# Patient Record
Sex: FEMALE | Race: WHITE | NOT HISPANIC OR LATINO | Employment: FULL TIME | URBAN - METROPOLITAN AREA
[De-identification: names, ages, dates, MRNs, and addresses within clinical notes are randomized per-mention and may not be internally consistent; named-entity substitution may affect disease eponyms.]

---

## 2017-01-23 DIAGNOSIS — M06.9 RHEUMATOID ARTHRITIS, INVOLVING UNSPECIFIED SITE, UNSPECIFIED RHEUMATOID FACTOR PRESENCE: ICD-10-CM

## 2017-01-23 NOTE — TELEPHONE ENCOUNTER
Please have patient call and make an appointment.  I have ordered labs to be done before next appointment.  We will refill at next appointment AND when labs are completed.

## 2017-01-23 NOTE — TELEPHONE ENCOUNTER
Was the patient seen in the last year in this department? Yes  last apt. Was is march    Does patient have an active prescription for medications requested? No     Received Request Via: Pharmacy

## 2017-03-22 ENCOUNTER — OFFICE VISIT (OUTPATIENT)
Dept: RHEUMATOLOGY | Facility: PHYSICIAN GROUP | Age: 47
End: 2017-03-22
Payer: MEDICAID

## 2017-03-22 VITALS
DIASTOLIC BLOOD PRESSURE: 92 MMHG | BODY MASS INDEX: 29.91 KG/M2 | TEMPERATURE: 97.9 F | RESPIRATION RATE: 12 BRPM | HEART RATE: 60 BPM | WEIGHT: 191 LBS | OXYGEN SATURATION: 97 % | SYSTOLIC BLOOD PRESSURE: 138 MMHG

## 2017-03-22 DIAGNOSIS — L40.9 PSORIASIS: ICD-10-CM

## 2017-03-22 DIAGNOSIS — L40.50 PSORIATIC ARTHRITIS (HCC): ICD-10-CM

## 2017-03-22 DIAGNOSIS — Z72.0 TOBACCO USE: ICD-10-CM

## 2017-03-22 DIAGNOSIS — Z79.899 ENCOUNTER FOR LONG-TERM (CURRENT) USE OF HIGH-RISK MEDICATION: ICD-10-CM

## 2017-03-22 PROCEDURE — 99214 OFFICE O/P EST MOD 30 MIN: CPT | Performed by: INTERNAL MEDICINE

## 2017-03-22 ASSESSMENT — ENCOUNTER SYMPTOMS
COUGH: 0
MYALGIAS: 0
CHILLS: 0
FEVER: 0

## 2017-03-22 NOTE — PROGRESS NOTES
Subjective:   Date of Consultation:3/22/2017  1:55 PM  Primary care physician:Keira Lucas M.D.    Reason for Consultation:  Ms. Woodard  is a pleasant 46 y.o. year old female who presented with Psoriasis/Psoriatic Arthritis      Psoriatic Arthritis  She states that her hands and wrists.  She also has knee and hip pain.  She feels worse in the evening after work.  She feels most stiff in the morning for 30-45 minutes.  Throughout the day her joints bother her.  She notes her feet, hips hurt.  She has a lot of pain when she uses them a lot at work.  She notes increase swelling when she first wakes up and her feet with prolonged standing 30 minutes will swell.    Psoriasis  Is markedly improved with methotrexate  Improved from last visit    Current Medications  Methotrexate 5 tabs weekly po q Saturday  Is not taking folic acid  Enbrel q Saturday      Social history: 1 ppd desire to quit and has asked for chantix and waiting for insurance.    Past Medical History   Diagnosis Date   • Psoriasis    • Smoking    • Hypertension      Borderline   • CAD (coronary artery disease) December 2012     NSTEMI. Coronary angiogram with 60% LAD stenosis, 40% stenosis proximal LAD, 20% stenosis mid circumflex.   • Hyperlipidemia    • Depression    • Shortness of breath February 2014     Echocardiogram with normal LV size, LVEF 60-65%, no valvular abnormalities.     No past surgical history on file.  No Known Allergies  Outpatient Encounter Prescriptions as of 3/22/2017   Medication Sig Dispense Refill   • TRAZODONE HCL PO Take  by mouth.     • methotrexate 2.5 MG Tab Take 5 Tabs by mouth every 7 days. No further refills without labs and appointment 25 Tab 0   • carisoprodol (SOMA) 350 MG Tab      • Carisoprodol 250 MG Tab      • hydrocodone/acetaminophen (NORCO)  MG Tab      • metoprolol SR (TOPROL XL) 100 MG TABLET SR 24 HR      • spironolactone (ALDACTONE) 25 MG Tab      • topiramate (TOPAMAX) 50 MG tablet      • Etanercept  (ENBREL SURECLICK) 50 MG/ML Solution Auto-injector Inject 50 mg as instructed every 7 days. 4 Syringe 12   • ENBREL SURECLICK 50 MG/ML SOAJ INJECT THE CONTENTS OF 1 SURECLICK (50 MG) SUBCUTANEOUSLY ONCE WEEKLY. 11.76 mL 1   • metoprolol SR (TOPROL XL) 50 MG TB24 Take 2 Tabs by mouth every day. 60 Tab 3   • alprazolam (XANAX) 0.5 MG TABS Take 0.5 mg by mouth at bedtime as needed.     • sertraline (ZOLOFT) 100 MG TABS Take 100 mg by mouth every day.     • sertraline (ZOLOFT) 50 MG TABS Take 50 mg by mouth every day.     • amlodipine (NORVASC) 10 MG TABS Take 10 mg by mouth every day.     • potassium chloride SA (K-DUR) 20 MEQ TBCR Take 20 mEq by mouth every day.     • aspirin (ASA) 81 MG CHEW Take 1 Tab by mouth every day. 100 Each 3   • atorvastatin (LIPITOR) 40 MG TABS Take 1 Tab by mouth every bedtime. 30 Tab 11   • clopidogrel (PLAVIX) 75 MG TABS Take 1 Tab by mouth every day. 30 Tab 11   • folic acid (FOLVITE) 1 MG Tab Take 1 Tab by mouth every day. 90 Tab 3   • tramadol (ULTRAM) 50 MG Tab      • zolpidem (AMBIEN) 10 MG Tab      • predniSONE (DELTASONE) 5 MG Tab Take 15 mg daily for 1 week then 10 mg daily for 1 week and then 5 mg daily. 60 Tab 1   • predniSONE (DELTASONE) 5 MG Tab Take 15 mg daily for 1 week then 10 mg daily for 1 week and then 5 mg daily. 60 Tab 1   • nitroglycerin (NITRODUR) 0.4 MG/HR PT24 Apply 1 Patch to skin as directed every day. wear patch for 12 hours, then remove for 12 hours.     • hydrocodone-acetaminophen (NORCO) 5-325 MG TABS per tablet Take 1-2 Tabs by mouth as needed.     • furosemide (LASIX) 40 MG TABS Take 40 mg by mouth every day.       No facility-administered encounter medications on file as of 3/22/2017.       Social History     Social History   • Marital Status: Single     Spouse Name: N/A   • Number of Children: N/A   • Years of Education: N/A     Occupational History   • Not on file.     Social History Main Topics   • Smoking status: Current Every Day Smoker   • Smokeless  tobacco: Never Used   • Alcohol Use: Yes   • Drug Use: Not on file   • Sexual Activity: Not on file     Other Topics Concern   • Not on file     Social History Narrative      History   Smoking status   • Current Every Day Smoker   Smokeless tobacco   • Never Used     History   Alcohol Use   • Yes     History   Drug Use Not on file      OB History   No data available      Patient's last menstrual period was 03/13/2017.    G P A JESSE     Family History   Problem Relation Age of Onset   • Heart Disease Sister        Review of Systems   Constitutional: Negative for fever and chills.   Respiratory: Negative for cough.    Cardiovascular: Negative for chest pain.   Musculoskeletal: Positive for joint pain. Negative for myalgias.   Skin: Positive for rash.        Objective:   /92 mmHg  Pulse 60  Temp(Src) 36.6 °C (97.9 °F)  Resp 12  Wt 86.637 kg (191 lb)  SpO2 97%  LMP 03/13/2017  Breastfeeding? No    Physical Exam   Constitutional: She is oriented to person, place, and time. She appears well-developed and well-nourished. No distress.   HENT:   Head: Normocephalic and atraumatic.   Right Ear: External ear normal.   Left Ear: External ear normal.   Eyes: Conjunctivae are normal. Right eye exhibits no discharge. Left eye exhibits no discharge. No scleral icterus.   Cardiovascular: Normal rate, regular rhythm and normal heart sounds.    Pulmonary/Chest: Effort normal and breath sounds normal. No stridor. No respiratory distress. She has no wheezes. She has no rales.   Abdominal: Soft.   Musculoskeletal: She exhibits no edema.   Tenderness over the Achilles tendon left greater than right. No overt synovitis appreciated. The DIPs and MCPs and there is mild tenderness on palpation at the PIPs.   Neurological: She is alert and oriented to person, place, and time. No cranial nerve deficit.   Skin: Skin is warm. Rash noted. She is not diaphoretic. No pallor.   Limited skin exam.  On the right anterior shin there is lesion  on the surface with mild scaling   Psychiatric: She has a normal mood and affect. Her behavior is normal. Judgment and thought content normal.       Assessment:     1. Tobacco use  HEP C VIRUS ANTIBODY    BUN    CBC WITH DIFFERENTIAL    CREATININE    HEPATIC FUNCTION PANEL    WESTERGREN SED RATE    CRP QUANTITIVE (NON-CARDIAC)    VITAMIN D,25 HYDROXY   2. Psoriasis  HEP C VIRUS ANTIBODY    BUN    CBC WITH DIFFERENTIAL    CREATININE    HEPATIC FUNCTION PANEL    WESTERGREN SED RATE    CRP QUANTITIVE (NON-CARDIAC)    VITAMIN D,25 HYDROXY   3. Psoriatic arthritis (CMS-HCC)  HEP C VIRUS ANTIBODY    BUN    CBC WITH DIFFERENTIAL    CREATININE    HEPATIC FUNCTION PANEL    WESTERGREN SED RATE    CRP QUANTITIVE (NON-CARDIAC)    VITAMIN D,25 HYDROXY   4. Encounter for long-term (current) use of high-risk medication       Labs:    No results found for: QNTTBGOLD  No results found for: HEPBCORTOT, HEPBCORIGM, HEPBSAG  No results found for: HEPCAB  No results found for: SODIUM, POTASSIUM, CHLORIDE, CO2, GLUCOSE, BUN, CREATININE, BUNCREATRAT, GLOMRATE   No results found for: WBC, RBC, HEMOGLOBIN, HEMATOCRIT, MCV, MCH, MCHC, MPV, NEUTSPOLYS, LYMPHOCYTES, MONOCYTES, EOSINOPHILS, BASOPHILS, HYPOCHROMIA, ANISOCYTOSIS   No results found for: CALCIUM, ASTSGOT, ALTSGPT, ALKPHOSPHAT, TBILIRUBIN, ALBUMIN, TOTPROTEIN  No results found for: URICACID, RHEUMFACTN, CCPANTIBODY, ANTINUCAB  No results found for: SEDRATEWES, CREACTPROT  No results found for: RUSSELVIPER, DRVVTINTERP  No results found for: D1SRWLPGNMW, Y3DVOCVCWIL, IGGCARDIOLI, IGMCARDIOLI, IGACARDIOLI, CRYOGLOBULIN  No results found for: ANADIRECT, ANTIDNADS, RNPAB, SMITHAB, HMXFNUB70, SSAROAB, SSBLAAB, DIEDMO6GX, CENTROMBAB  No results found for: ANTIDNADS, DSDNA, AGBMAB, GBMABA, ANCAIGG, W3MWYHEHNBD, JO1AB, RNPAB, ANTISSASJ, ANTISSBSJ  No results found for: COLORURINE, SPECGRAVITY, PHURINE, GLUCOSEUR, KETONES, PROTEINURIN  No results found for: TOTPROTUR, TOTALVOLUME,  [id number]  No results found for: SSA60, SSA52  No results found for: HBA1C  No results found for: CPKTOTAL  No results found for: G6PD  No results found for: JBEH63GQUF  No results found for: ACESERUM  No results found for: 25HYDROXY  No results found for: TSH, FREEDIR  No results found for: TSHULTRASEN, FREET4  No results found for: MICROSOMALA, ANTITHYROGL  No results found for: IGGLYMEABS  No results found for: ANTIMITOCHO, FACTIN  No results found for: IGA, TTRANSIGA, ENDOIGA  No results found for: FLTYPE, CRYSTALSBDF  No results found for: ISTATICAL  No results found for: ISTATCREAT  No results found for: CTELOPEP  No results found for: GBMABG  No results found for: PTHINTACT      Medical Decision Making:  Today's Assessment / Status / Plan:     Psoriasis  Improved but still with active lesions      Psoriatic Arthritis  Uncontrolled with enthesitis and prolonged morning stiffness  Patient has been off methotrexate  We will obtain labs and if normal will restart at a higher dose since it did not control her arthritis.  We will start at 8 tablets weekly po  She is continuing Enbrel    Encounter for high risk medications.      Tobacco use  Patient states that she is currently trying to quit. Waiting for insurance to approve Chantix    1. Tobacco use  HEP C VIRUS ANTIBODY    BUN    CBC WITH DIFFERENTIAL    CREATININE    HEPATIC FUNCTION PANEL    WESTERGREN SED RATE    CRP QUANTITIVE (NON-CARDIAC)    VITAMIN D,25 HYDROXY   2. Psoriasis  HEP C VIRUS ANTIBODY    BUN    CBC WITH DIFFERENTIAL    CREATININE    HEPATIC FUNCTION PANEL    WESTERGREN SED RATE    CRP QUANTITIVE (NON-CARDIAC)    VITAMIN D,25 HYDROXY   3. Psoriatic arthritis (CMS-HCC)  HEP C VIRUS ANTIBODY    BUN    CBC WITH DIFFERENTIAL    CREATININE    HEPATIC FUNCTION PANEL    WESTERGREN SED RATE    CRP QUANTITIVE (NON-CARDIAC)    VITAMIN D,25 HYDROXY   4. Encounter for long-term (current) use of high-risk medication       Return in about 3 months (around  6/22/2017).        I have spent greater than 50% of this 30 minute visit in counseling/coordination of care with the patient regarding her disease, symptoms and therapy plan..    She agreed and verbalized her agreement and understanding with the current plan. I answered all questions and concerns she has at this time and advised her to call at any time in there interim with questions or concerns in regards to her care.    Thank you for allowing me to participate in her care, I will continue to follow closely.     Please note that this dictation was created using voice recognition software. I have made every reasonable attempt to correct obvious errors, but I expect that there are errors of grammar and possibly content that I did not discover before finalizing the note.

## 2017-03-22 NOTE — Clinical Note
[facility]   [address], [address]  [place] [address]  Phone: [phone]  Fax: [phone]              Encounter Date: 3/22/2017    Dear Dr. Lucas,    It was a pleasure seeing your patient, Lorene Woodard, on 3/22/2017. Diagnoses of Tobacco use, Psoriasis, Psoriatic arthritis (CMS-[facility]), and Encounter for long-term (current) use of high-risk medication were pertinent to this visit.     Please find attached progress note which includes the history I obtained from Ms. Woodard, my physical examination findings, my impression and recommendations.      Once again, it was a pleasure participating in your patient's care.  Please feel free to contact me if you have any questions or if I can be of any further assistance to your patients.      Sincerely,    Tiffany Anders M.D.  Electronically Signed          PROGRESS NOTE:  Subjective:   Date of Consultation:3/22/2017  1:55 PM  Primary care physician:Keira Lucas M.D.    Reason for Consultation:  Ms. Woodard  is a pleasant 46 y.o. year old female who presented with Psoriasis/Psoriatic Arthritis      Psoriatic Arthritis  She states that her hands and wrists.  She also has knee and hip pain.  She feels worse in the evening after work.  She feels most stiff in the morning for 30-45 minutes.  Throughout the day her joints bother her.  She notes her feet, hips hurt.  She has a lot of pain when she uses them a lot at work.  She notes increase swelling when she first wakes up and her feet with prolonged standing 30 minutes will swell.    Psoriasis  Is markedly improved with methotrexate  Improved from last visit    Current Medications  Methotrexate 5 tabs weekly po q Saturday  Is not taking folic acid  Enbrel q Saturday      Social history: 1 ppd desire to quit and has asked for chantix and waiting for insurance.    Past Medical History   Diagnosis Date   • Psoriasis    • Smoking    • Hypertension      Borderline   • CAD (coronary artery disease) December  2012     NSTEMI. Coronary angiogram with 60% LAD stenosis, 40% stenosis proximal LAD, 20% stenosis mid circumflex.   • Hyperlipidemia    • Depression    • Shortness of breath February 2014     Echocardiogram with normal LV size, LVEF 60-65%, no valvular abnormalities.     No past surgical history on file.  No Known Allergies  Outpatient Encounter Prescriptions as of 3/22/2017   Medication Sig Dispense Refill   • TRAZODONE HCL PO Take  by mouth.     • methotrexate 2.5 MG Tab Take 5 Tabs by mouth every 7 days. No further refills without labs and appointment 25 Tab 0   • carisoprodol (SOMA) 350 MG Tab      • Carisoprodol 250 MG Tab      • hydrocodone/acetaminophen (NORCO)  MG Tab      • metoprolol SR (TOPROL XL) 100 MG TABLET SR 24 HR      • spironolactone (ALDACTONE) 25 MG Tab      • topiramate (TOPAMAX) 50 MG tablet      • Etanercept (ENBREL SURECLICK) 50 MG/ML Solution Auto-injector Inject 50 mg as instructed every 7 days. 4 Syringe 12   • ENBREL SURECLICK 50 MG/ML SOAJ INJECT THE CONTENTS OF 1 SURECLICK (50 MG) SUBCUTANEOUSLY ONCE WEEKLY. 11.76 mL 1   • metoprolol SR (TOPROL XL) 50 MG TB24 Take 2 Tabs by mouth every day. 60 Tab 3   • alprazolam (XANAX) 0.5 MG TABS Take 0.5 mg by mouth at bedtime as needed.     • sertraline (ZOLOFT) 100 MG TABS Take 100 mg by mouth every day.     • sertraline (ZOLOFT) 50 MG TABS Take 50 mg by mouth every day.     • amlodipine (NORVASC) 10 MG TABS Take 10 mg by mouth every day.     • potassium chloride SA (K-DUR) 20 MEQ TBCR Take 20 mEq by mouth every day.     • aspirin (ASA) 81 MG CHEW Take 1 Tab by mouth every day. 100 Each 3   • atorvastatin (LIPITOR) 40 MG TABS Take 1 Tab by mouth every bedtime. 30 Tab 11   • clopidogrel (PLAVIX) 75 MG TABS Take 1 Tab by mouth every day. 30 Tab 11   • folic acid (FOLVITE) 1 MG Tab Take 1 Tab by mouth every day. 90 Tab 3   • tramadol (ULTRAM) 50 MG Tab      • zolpidem (AMBIEN) 10 MG Tab      • predniSONE (DELTASONE) 5 MG Tab Take 15 mg daily  for 1 week then 10 mg daily for 1 week and then 5 mg daily. 60 Tab 1   • predniSONE (DELTASONE) 5 MG Tab Take 15 mg daily for 1 week then 10 mg daily for 1 week and then 5 mg daily. 60 Tab 1   • nitroglycerin (NITRODUR) 0.4 MG/HR PT24 Apply 1 Patch to skin as directed every day. wear patch for 12 hours, then remove for 12 hours.     • hydrocodone-acetaminophen (NORCO) 5-325 MG TABS per tablet Take 1-2 Tabs by mouth as needed.     • furosemide (LASIX) 40 MG TABS Take 40 mg by mouth every day.       No facility-administered encounter medications on file as of 3/22/2017.       Social History     Social History   • Marital Status: Single     Spouse Name: N/A   • Number of Children: N/A   • Years of Education: N/A     Occupational History   • Not on file.     Social History Main Topics   • Smoking status: Current Every Day Smoker   • Smokeless tobacco: Never Used   • Alcohol Use: Yes   • Drug Use: Not on file   • Sexual Activity: Not on file     Other Topics Concern   • Not on file     Social History Narrative      History   Smoking status   • Current Every Day Smoker   Smokeless tobacco   • Never Used     History   Alcohol Use   • Yes     History   Drug Use Not on file      OB History   No data available      Patient's last menstrual period was 03/13/2017.    G P A L     Family History   Problem Relation Age of Onset   • Heart Disease Sister        Review of Systems   Constitutional: Negative for fever and chills.   Respiratory: Negative for cough.    Cardiovascular: Negative for chest pain.   Musculoskeletal: Positive for joint pain. Negative for myalgias.   Skin: Positive for rash.        Objective:   /92 mmHg  Pulse 60  Temp(Src) 36.6 °C (97.9 °F)  Resp 12  Wt 86.637 kg (191 lb)  SpO2 97%  LMP 03/13/2017  Breastfeeding? No    Physical Exam   Constitutional: She is oriented to person, place, and time. She appears well-developed and well-nourished. No distress.   HENT:   Head: Normocephalic and atraumatic.    Right Ear: External ear normal.   Left Ear: External ear normal.   Eyes: Conjunctivae are normal. Right eye exhibits no discharge. Left eye exhibits no discharge. No scleral icterus.   Cardiovascular: Normal rate, regular rhythm and normal heart sounds.    Pulmonary/Chest: Effort normal and breath sounds normal. No stridor. No respiratory distress. She has no wheezes. She has no rales.   Abdominal: Soft.   Musculoskeletal: She exhibits no edema.   Tenderness over the Achilles tendon left greater than right. No overt synovitis appreciated. The DIPs and MCPs and there is mild tenderness on palpation at the PIPs.   Neurological: She is alert and oriented to person, place, and time. No cranial nerve deficit.   Skin: Skin is warm. Rash noted. She is not diaphoretic. No pallor.   Limited skin exam.  On the right anterior shin there is lesion on the surface with mild scaling   Psychiatric: She has a normal mood and affect. Her behavior is normal. Judgment and thought content normal.       Assessment:     1. Tobacco use  HEP C VIRUS ANTIBODY    BUN    CBC WITH DIFFERENTIAL    CREATININE    HEPATIC FUNCTION PANEL    WESTERGREN SED RATE    CRP QUANTITIVE (NON-CARDIAC)    VITAMIN D,25 HYDROXY   2. Psoriasis  HEP C VIRUS ANTIBODY    BUN    CBC WITH DIFFERENTIAL    CREATININE    HEPATIC FUNCTION PANEL    WESTERGREN SED RATE    CRP QUANTITIVE (NON-CARDIAC)    VITAMIN D,25 HYDROXY   3. Psoriatic arthritis (CMS-HCC)  HEP C VIRUS ANTIBODY    BUN    CBC WITH DIFFERENTIAL    CREATININE    HEPATIC FUNCTION PANEL    WESTERGREN SED RATE    CRP QUANTITIVE (NON-CARDIAC)    VITAMIN D,25 HYDROXY   4. Encounter for long-term (current) use of high-risk medication       Labs:    No results found for: QNTTBGOLD  No results found for: HEPBCORTOT, HEPBCORIGM, HEPBSAG  No results found for: HEPCAB  No results found for: SODIUM, POTASSIUM, CHLORIDE, CO2, GLUCOSE, BUN, CREATININE, BUNCREATRAT, GLOMRATE   No results found for: WBC, RBC, HEMOGLOBIN,  HEMATOCRIT, MCV, MCH, MCHC, MPV, NEUTSPOLYS, LYMPHOCYTES, MONOCYTES, EOSINOPHILS, BASOPHILS, HYPOCHROMIA, ANISOCYTOSIS   No results found for: CALCIUM, ASTSGOT, ALTSGPT, ALKPHOSPHAT, TBILIRUBIN, ALBUMIN, TOTPROTEIN  No results found for: URICACID, RHEUMFACTN, CCPANTIBODY, ANTINUCAB  No results found for: SEDRATEWES, CREACTPROT  No results found for: RUSSELVIPER, DRVVTINTERP  No results found for: G2DMMXQGJHZ, V6AVGRDGPEV, IGGCARDIOLI, IGMCARDIOLI, IGACARDIOLI, CRYOGLOBULIN  No results found for: ANADIRECT, ANTIDNADS, RNPAB, SMITHAB, NPERGJA30, SSAROAB, SSBLAAB, ZYDWWX9BG, CENTROMBAB  No results found for: ANTIDNADS, DSDNA, AGBMAB, GBMABA, ANCAIGG, [id number], JO1AB, RNPAB, ANTISSASJ, ANTISSBSJ  No results found for: COLORURINE, SPECGRAVITY, PHURINE, GLUCOSEUR, KETONES, PROTEINURIN  No results found for: TOTPROTUR, TOTALVOLUME, [id number]  No results found for: SSA60, SSA52  No results found for: HBA1C  No results found for: CPKTOTAL  No results found for: G6PD  No results found for: INYF48SQUB  No results found for: ACESERUM  No results found for: 25HYDROXY  No results found for: TSH, FREEDIR  No results found for: TSHULTRASEN, FREET4  No results found for: MICROSOMALA, ANTITHYROGL  No results found for: IGGLYMEABS  No results found for: ANTIMITOCHO, FACTIN  No results found for: IGA, TTRANSIGA, ENDOIGA  No results found for: FLTYPE, CRYSTALSBDF  No results found for: ISTATICAL  No results found for: ISTATCREAT  No results found for: CTELOPEP  No results found for: GBMABG  No results found for: PTHINTACT      Medical Decision Making:  Today's Assessment / Status / Plan:     Psoriasis  Improved but still with active lesions      Psoriatic Arthritis  Uncontrolled with enthesitis and prolonged morning stiffness  Patient has been off methotrexate  We will obtain labs and if normal will restart at a higher dose since it did not control her arthritis.  We will start at 8 tablets weekly po  She is continuing  Ricki    Encounter for high risk medications.      Tobacco use  Patient states that she is currently trying to quit. Waiting for insurance to approve Chantix    1. Tobacco use  HEP C VIRUS ANTIBODY    BUN    CBC WITH DIFFERENTIAL    CREATININE    HEPATIC FUNCTION PANEL    WESTERGREN SED RATE    CRP QUANTITIVE (NON-CARDIAC)    VITAMIN D,25 HYDROXY   2. Psoriasis  HEP C VIRUS ANTIBODY    BUN    CBC WITH DIFFERENTIAL    CREATININE    HEPATIC FUNCTION PANEL    WESTERGREN SED RATE    CRP QUANTITIVE (NON-CARDIAC)    VITAMIN D,25 HYDROXY   3. Psoriatic arthritis (CMS-HCC)  HEP C VIRUS ANTIBODY    BUN    CBC WITH DIFFERENTIAL    CREATININE    HEPATIC FUNCTION PANEL    WESTERGREN SED RATE    CRP QUANTITIVE (NON-CARDIAC)    VITAMIN D,25 HYDROXY   4. Encounter for long-term (current) use of high-risk medication       Return in about 3 months (around 6/22/2017).        I have spent greater than 50% of this 30 minute visit in counseling/coordination of care with the patient regarding her disease, symptoms and therapy plan..    She agreed and verbalized her agreement and understanding with the current plan. I answered all questions and concerns she has at this time and advised her to call at any time in there interim with questions or concerns in regards to her care.    Thank you for allowing me to participate in her care, I will continue to follow closely.     Please note that this dictation was created using voice recognition software. I have made every reasonable attempt to correct obvious errors, but I expect that there are errors of grammar and possibly content that I did not discover before finalizing the note.

## 2017-06-22 ENCOUNTER — APPOINTMENT (OUTPATIENT)
Dept: RHEUMATOLOGY | Facility: PHYSICIAN GROUP | Age: 47
End: 2017-06-22
Payer: MEDICAID

## 2020-01-23 ENCOUNTER — OFFICE VISIT (OUTPATIENT)
Dept: RHEUMATOLOGY | Facility: MEDICAL CENTER | Age: 50
End: 2020-01-23
Payer: COMMERCIAL

## 2020-01-23 ENCOUNTER — HOSPITAL ENCOUNTER (OUTPATIENT)
Dept: RADIOLOGY | Facility: MEDICAL CENTER | Age: 50
End: 2020-01-23
Attending: INTERNAL MEDICINE
Payer: COMMERCIAL

## 2020-01-23 ENCOUNTER — HOSPITAL ENCOUNTER (OUTPATIENT)
Dept: LAB | Facility: MEDICAL CENTER | Age: 50
End: 2020-01-23
Attending: INTERNAL MEDICINE
Payer: COMMERCIAL

## 2020-01-23 VITALS
TEMPERATURE: 98.7 F | BODY MASS INDEX: 32.15 KG/M2 | WEIGHT: 204.8 LBS | DIASTOLIC BLOOD PRESSURE: 70 MMHG | SYSTOLIC BLOOD PRESSURE: 98 MMHG | HEIGHT: 67 IN | OXYGEN SATURATION: 97 % | HEART RATE: 77 BPM | RESPIRATION RATE: 16 BRPM

## 2020-01-23 DIAGNOSIS — M54.50 CHRONIC BILATERAL LOW BACK PAIN WITHOUT SCIATICA: ICD-10-CM

## 2020-01-23 DIAGNOSIS — Z79.899 HIGH RISK MEDICATION USE: ICD-10-CM

## 2020-01-23 DIAGNOSIS — I25.83 CORONARY ARTERY DISEASE DUE TO LIPID RICH PLAQUE: ICD-10-CM

## 2020-01-23 DIAGNOSIS — L40.50 PSORIATIC ARTHRITIS (HCC): ICD-10-CM

## 2020-01-23 DIAGNOSIS — I10 ESSENTIAL HYPERTENSION, BENIGN: ICD-10-CM

## 2020-01-23 DIAGNOSIS — L40.9 PSORIASIS: ICD-10-CM

## 2020-01-23 DIAGNOSIS — L40.50 PSORIATIC ARTHRITIS (HCC): Primary | ICD-10-CM

## 2020-01-23 DIAGNOSIS — G89.29 CHRONIC BILATERAL LOW BACK PAIN WITHOUT SCIATICA: ICD-10-CM

## 2020-01-23 DIAGNOSIS — Z72.0 TOBACCO USE: ICD-10-CM

## 2020-01-23 DIAGNOSIS — I25.10 CORONARY ARTERY DISEASE DUE TO LIPID RICH PLAQUE: ICD-10-CM

## 2020-01-23 LAB
ALBUMIN SERPL BCP-MCNC: 3.9 G/DL (ref 3.2–4.9)
ALBUMIN/GLOB SERPL: 1.1 G/DL
ALP SERPL-CCNC: 119 U/L (ref 30–99)
ALT SERPL-CCNC: 14 U/L (ref 2–50)
ANION GAP SERPL CALC-SCNC: 10 MMOL/L (ref 0–11.9)
AST SERPL-CCNC: 17 U/L (ref 12–45)
BASOPHILS # BLD AUTO: 0.5 % (ref 0–1.8)
BASOPHILS # BLD: 0.04 K/UL (ref 0–0.12)
BILIRUB SERPL-MCNC: 0.4 MG/DL (ref 0.1–1.5)
BUN SERPL-MCNC: 12 MG/DL (ref 8–22)
CALCIUM SERPL-MCNC: 9.9 MG/DL (ref 8.5–10.5)
CHLORIDE SERPL-SCNC: 106 MMOL/L (ref 96–112)
CO2 SERPL-SCNC: 23 MMOL/L (ref 20–33)
CREAT SERPL-MCNC: 0.8 MG/DL (ref 0.5–1.4)
CRP SERPL HS-MCNC: 1.08 MG/DL (ref 0–0.75)
EOSINOPHIL # BLD AUTO: 0.18 K/UL (ref 0–0.51)
EOSINOPHIL NFR BLD: 2.4 % (ref 0–6.9)
ERYTHROCYTE [DISTWIDTH] IN BLOOD BY AUTOMATED COUNT: 45.7 FL (ref 35.9–50)
ERYTHROCYTE [SEDIMENTATION RATE] IN BLOOD BY WESTERGREN METHOD: 20 MM/HOUR (ref 0–20)
FASTING STATUS PATIENT QL REPORTED: NORMAL
GLOBULIN SER CALC-MCNC: 3.7 G/DL (ref 1.9–3.5)
GLUCOSE SERPL-MCNC: 102 MG/DL (ref 65–99)
HBV CORE AB SERPL QL IA: NEGATIVE
HBV SURFACE AG SER QL: NEGATIVE
HCT VFR BLD AUTO: 44.7 % (ref 37–47)
HCV AB SER QL: NEGATIVE
HGB BLD-MCNC: 14.5 G/DL (ref 12–16)
IMM GRANULOCYTES # BLD AUTO: 0.03 K/UL (ref 0–0.11)
IMM GRANULOCYTES NFR BLD AUTO: 0.4 % (ref 0–0.9)
LYMPHOCYTES # BLD AUTO: 2.05 K/UL (ref 1–4.8)
LYMPHOCYTES NFR BLD: 27 % (ref 22–41)
MCH RBC QN AUTO: 28.7 PG (ref 27–33)
MCHC RBC AUTO-ENTMCNC: 32.4 G/DL (ref 33.6–35)
MCV RBC AUTO: 88.5 FL (ref 81.4–97.8)
MONOCYTES # BLD AUTO: 0.57 K/UL (ref 0–0.85)
MONOCYTES NFR BLD AUTO: 7.5 % (ref 0–13.4)
NEUTROPHILS # BLD AUTO: 4.71 K/UL (ref 2–7.15)
NEUTROPHILS NFR BLD: 62.2 % (ref 44–72)
NRBC # BLD AUTO: 0 K/UL
NRBC BLD-RTO: 0 /100 WBC
PLATELET # BLD AUTO: 271 K/UL (ref 164–446)
PMV BLD AUTO: 9.4 FL (ref 9–12.9)
POTASSIUM SERPL-SCNC: 4.5 MMOL/L (ref 3.6–5.5)
PROT SERPL-MCNC: 7.6 G/DL (ref 6–8.2)
RBC # BLD AUTO: 5.05 M/UL (ref 4.2–5.4)
SODIUM SERPL-SCNC: 139 MMOL/L (ref 135–145)
WBC # BLD AUTO: 7.6 K/UL (ref 4.8–10.8)

## 2020-01-23 PROCEDURE — 72202 X-RAY EXAM SI JOINTS 3/> VWS: CPT

## 2020-01-23 PROCEDURE — 86480 TB TEST CELL IMMUN MEASURE: CPT

## 2020-01-23 PROCEDURE — 85652 RBC SED RATE AUTOMATED: CPT

## 2020-01-23 PROCEDURE — 80053 COMPREHEN METABOLIC PANEL: CPT

## 2020-01-23 PROCEDURE — 86140 C-REACTIVE PROTEIN: CPT

## 2020-01-23 PROCEDURE — 86803 HEPATITIS C AB TEST: CPT

## 2020-01-23 PROCEDURE — 77077 JOINT SURVEY SINGLE VIEW: CPT

## 2020-01-23 PROCEDURE — 82955 ASSAY OF G6PD ENZYME: CPT

## 2020-01-23 PROCEDURE — 99214 OFFICE O/P EST MOD 30 MIN: CPT | Performed by: INTERNAL MEDICINE

## 2020-01-23 PROCEDURE — 86704 HEP B CORE ANTIBODY TOTAL: CPT

## 2020-01-23 PROCEDURE — 36415 COLL VENOUS BLD VENIPUNCTURE: CPT

## 2020-01-23 PROCEDURE — 87340 HEPATITIS B SURFACE AG IA: CPT

## 2020-01-23 PROCEDURE — 85025 COMPLETE CBC W/AUTO DIFF WBC: CPT

## 2020-01-23 RX ORDER — OXYCODONE HCL 10 MG/1
10 TABLET, FILM COATED, EXTENDED RELEASE ORAL EVERY 12 HOURS
Status: ON HOLD | COMMUNITY
End: 2023-04-22

## 2020-01-23 RX ORDER — GABAPENTIN 100 MG/1
CAPSULE ORAL
Status: ON HOLD | COMMUNITY
Start: 2019-12-31 | End: 2023-04-22

## 2020-01-23 ASSESSMENT — PAIN SCALES - GENERAL: PAINLEVEL: 3=SLIGHT PAIN

## 2020-01-23 NOTE — PATIENT INSTRUCTIONS
Etanercept injection  What is this medicine?  ETANERCEPT (et a NER sept) is used for the treatment of rheumatoid arthritis in adults and children. The medicine is also used to treat psoriatic arthritis, ankylosing spondylitis, and psoriasis.  This medicine may be used for other purposes; ask your health care provider or pharmacist if you have questions.  COMMON BRAND NAME(S): Ricki  What should I tell my health care provider before I take this medicine?  They need to know if you have any of these conditions:  -blood disorders  -cancer  -congestive heart failure  -diabetes  -exposure to chickenpox  -immune system problems  -infection  -multiple sclerosis  -seizure disorder  -tuberculosis, a positive skin test for tuberculosis or have recently been in close contact with someone who has tuberculosis  -Wegener's granulomatosis  -an unusual or allergic reaction to etanercept, latex, other medicines, foods, dyes, or preservatives  -pregnant or trying to get pregnant  -breast-feeding  How should I use this medicine?  The medicine is given by injection under the skin. You will be taught how to prepare and give this medicine. Use exactly as directed. Take your medicine at regular intervals. Do not take your medicine more often than directed.  It is important that you put your used needles and syringes in a special sharps container. Do not put them in a trash can. If you do not have a sharps container, call your pharmacist or healthcare provider to get one.  A special MedGuide will be given to you by the pharmacist with each prescription and refill. Be sure to read this information carefully each time.  Talk to your pediatrician regarding the use of this medicine in children. While this drug may be prescribed for children as young as 4 years of age for selected conditions, precautions do apply.  Overdosage: If you think you have taken too much of this medicine contact a poison control center or emergency room at once.  NOTE:  This medicine is only for you. Do not share this medicine with others.  What if I miss a dose?  If you miss a dose, contact your health care professional to find out when you should take your next dose. Do not take double or extra doses without advice.  What may interact with this medicine?  Do not take this medicine with any of the following medications:  -anakinra  This medicine may also interact with the following medications:  -cyclophosphamide  -sulfasalazine  -vaccines  This list may not describe all possible interactions. Give your health care provider a list of all the medicines, herbs, non-prescription drugs, or dietary supplements you use. Also tell them if you smoke, drink alcohol, or use illegal drugs. Some items may interact with your medicine.  What should I watch for while using this medicine?  Tell your doctor or healthcare professional if your symptoms do not start to get better or if they get worse.  You will be tested for tuberculosis (TB) before you start this medicine. If your doctor prescribes any medicine for TB, you should start taking the TB medicine before starting this medicine. Make sure to finish the full course of TB medicine.  Call your doctor or health care professional for advice if you get a fever, chills or sore throat, or other symptoms of a cold or flu. Do not treat yourself. This drug decreases your body's ability to fight infections. Try to avoid being around people who are sick.  What side effects may I notice from receiving this medicine?  Side effects that you should report to your doctor or health care professional as soon as possible:  -allergic reactions like skin rash, itching or hives, swelling of the face, lips, or tongue  -changes in vision  -fever, chills or any other sign of infection  -numbness or tingling in legs or other parts of the body  -red, scaly patches or raised bumps on the skin  -shortness of breath or difficulty breathing  -swollen lymph nodes in the  neck, underarm, or groin areas  -unexplained weight loss  -unusual bleeding or bruising  -unusual swelling or fluid retention in the legs  -unusually weak or tired  Side effects that usually do not require medical attention (report to your doctor or health care professional if they continue or are bothersome):  -dizziness  -headache  -nausea  -redness, itching, or swelling at the injection site  -vomiting  This list may not describe all possible side effects. Call your doctor for medical advice about side effects. You may report side effects to FDA at 2-555-MHG-7096.  Where should I keep my medicine?  Keep out of the reach of children.  Store between 2 and 8 degrees C (36 and 46 degrees F). Do not freeze or shake. Protect from light. Throw away any unused medicine after the expiration date.  You will be instructed on how to store this medicine.  NOTE: This sheet is a summary. It may not cover all possible information. If you have questions about this medicine, talk to your doctor, pharmacist, or health care provider.  © 2018 [employer]vier/Gold Standard (2013-06-24 15:33:36)

## 2020-01-24 LAB
G6PD RBC-CCNC: 10.7 U/G HB (ref 9.9–16.6)
GAMMA INTERFERON BACKGROUND BLD IA-ACNC: 0.02 IU/ML
M TB IFN-G BLD-IMP: NEGATIVE
M TB IFN-G CD4+ BCKGRND COR BLD-ACNC: 0 IU/ML
MITOGEN IGNF BCKGRD COR BLD-ACNC: >10 IU/ML
QFT TB2 - NIL TBQ2: 0 IU/ML

## 2020-02-13 DIAGNOSIS — L40.50 PSORIATIC ARTHRITIS (HCC): ICD-10-CM

## 2020-02-13 RX ORDER — MEDROXYPROGESTERONE ACETATE 150 MG/ML
50 INJECTION, SUSPENSION INTRAMUSCULAR
Qty: 12 SYRINGE | Refills: 1 | Status: SHIPPED | OUTPATIENT
Start: 2020-02-13 | End: 2020-09-13 | Stop reason: SDUPTHER

## 2020-02-13 NOTE — TELEPHONE ENCOUNTER
[facility] called per insurance they are no longer allowed  To dispense patients Enbrel. I updated the pharmacy to [facility] Thank you

## 2020-09-11 DIAGNOSIS — L40.50 PSORIATIC ARTHRITIS (HCC): ICD-10-CM

## 2020-09-11 RX ORDER — MEDROXYPROGESTERONE ACETATE 150 MG/ML
50 INJECTION, SUSPENSION INTRAMUSCULAR
OUTPATIENT
Start: 2020-09-11

## 2020-09-13 DIAGNOSIS — L40.50 PSORIATIC ARTHRITIS (HCC): ICD-10-CM

## 2020-09-14 RX ORDER — MEDROXYPROGESTERONE ACETATE 150 MG/ML
50 INJECTION, SUSPENSION INTRAMUSCULAR
Qty: 12 EACH | Refills: 1 | Status: SHIPPED | OUTPATIENT
Start: 2020-09-14 | End: 2020-09-14 | Stop reason: SDUPTHER

## 2020-09-14 NOTE — TELEPHONE ENCOUNTER
Please call the patient and schedule follow-up appointment in the next 3 to 4 months.  I have provided a refill of her Enbrel in the meantime however will need to establish with me at [facility] for further refills.

## 2020-09-14 NOTE — TELEPHONE ENCOUNTER
Called and spoke with patient.    This patient has [facility] and Medicaid as secondary. Patient believes that [facility] has scheduled her in the [place] office. Gave patient the [place] office numbers and advised to follow-up in [place] due to her Medicaid insurance.

## 2022-03-08 NOTE — PROGRESS NOTES
RHEUMATOLOGY CLINIC FOLLOW UP NOTE        Chief complaint: psoriasis/psoriatic arthritis        HPI:  48 y/o F with PsA/PsA presents today for follow up,  she is a new patient to me, previously followed by Dr. Anders, last seen 3/2017.      At the last visit she remained on MTX 5 tabs weekly and Enbrel weekly however had been off mTX due to lack of follow up, restarted at 8 tabs weekly and continued Enbrel and then did not follow up again.  She reports the last dose of her Enbrel was 2 years ago and last dose of MTX was in July 2019 (was on 10 tabs weekly at first then 7 tabs 3 times per week which she did for 1 and a half which was then stopped due to elevated LFTs (this was with her PCP).  She did find the Enbrel very helpful, however she states it was expensive and could not afford it as she lost insurance for a time.  No infections or malignancies while on it.      The psoriasis is now all over her body.  Her knees, elbows, wrists/fingers and back are all very painful.  She required a medrol dose pack last month due to the pain, she finished it 2 weeks. She rates her her pain at a 5/10 now with morning stiffness that lasts about 2 hours to get started. Currently taking oxycodone as needed for pain.    The last labs she had with her primary were in August 2019.    No hx of IBD or uveitis. She has a hx of depression but not for the last few years.  No fevers, chest pain or SOB. No new skin cancers or malignancies.     She is still smoking about 1ppd.        Rheum Background:  Labs:  No recent labs    Imaging:  No recent imaging    Immunizations:  Flu: 10/2019  Pneumovax: has not had  Ipdmoot57: has not had   Shingrix:  Zostavax:  Hep B:        ROS:    GEN: denies fevers, night sweats,weight loss or weight gain  CV:  no palpitations, no chest dyscomfort, no orthopnea  Pulm: no dyspnea, no cough  Heme/Onc: no history of cancers or hematologic abnormalities  Skin:+ psoriasis  Psych: hx of depression but not  currently  MS: per [facility] [place]            OUTPATIENT MEDS:    Prior to Admission medications    Medication Sig Start Date End Date Taking? Authorizing Provider   TRAZODONE HCL PO Take  by mouth.    Physician Outpatient   methotrexate 2.5 MG Tab Take 5 Tabs by mouth every 7 days. No further refills without labs and appointment 10/31/16   Dolores Berman M.D.   folic acid (FOLVITE) 1 MG Tab Take 1 Tab by mouth every day. 5/13/16   Scott Webber M.D.   carisoprodol (SOMA) 350 MG Tab  4/5/16   Physician Outpatient   Carisoprodol 250 MG Tab  3/3/16   Physician Outpatient   hydrocodone/acetaminophen (NORCO)  MG Tab  3/3/16   Physician Outpatient   metoprolol SR (TOPROL XL) 100 MG TABLET SR 24 HR  3/23/16   Physician Outpatient   spironolactone (ALDACTONE) 25 MG Tab  3/23/16   Physician Outpatient   topiramate (TOPAMAX) 50 MG tablet  2/1/16   Physician Outpatient   tramadol (ULTRAM) 50 MG Tab  3/21/16   Physician Outpatient   zolpidem (AMBIEN) 10 MG Tab  3/3/16   Physician Outpatient   Etanercept (ENBREL SURECLICK) 50 MG/ML Solution Auto-injector Inject 50 mg as instructed every 7 days. 3/29/16   Scott Webber M.D.   predniSONE (DELTASONE) 5 MG Tab Take 15 mg daily for 1 week then 10 mg daily for 1 week and then 5 mg daily. 3/29/16   Scott Webber M.D.   predniSONE (DELTASONE) 5 MG Tab Take 15 mg daily for 1 week then 10 mg daily for 1 week and then 5 mg daily. 3/29/16   Scott Webber M.D.   ENBREL SURECLICK 50 MG/ML SOAJ INJECT THE CONTENTS OF 1 SURECLICK (50 MG) SUBCUTANEOUSLY ONCE WEEKLY. 6/18/15   H Chrissy Goins M.D.   nitroglycerin (NITRODUR) 0.4 MG/HR PT24 Apply 1 Patch to skin as directed every day. wear patch for 12 hours, then remove for 12 hours.    Physician Outpatient   metoprolol SR (TOPROL XL) 50 MG TB24 Take 2 Tabs by mouth every day. 2/6/14   Elizabeth Freeman A.P.N.   hydrocodone-acetaminophen (NORCO) 5-325 MG TABS per tablet Take 1-2 Tabs by mouth as needed.    Physician  "Outpatient   alprazolam (XANAX) 0.5 MG TABS Take 0.5 mg by mouth at bedtime as needed.    Physician Outpatient   sertraline (ZOLOFT) 100 MG TABS Take 100 mg by mouth every day.    Physician Outpatient   sertraline (ZOLOFT) 50 MG TABS Take 50 mg by mouth every day.    Physician Outpatient   furosemide (LASIX) 40 MG TABS Take 40 mg by mouth every day.    Physician Outpatient   amlodipine (NORVASC) 10 MG TABS Take 10 mg by mouth every day.    Physician Outpatient   potassium chloride SA (K-DUR) 20 MEQ TBCR Take 20 mEq by mouth every day.    Physician Outpatient   aspirin (ASA) 81 MG CHEW Take 1 Tab by mouth every day. 12/29/12   Evan Kelly M.D.   atorvastatin (LIPITOR) 40 MG TABS Take 1 Tab by mouth every bedtime. 12/29/12   Evan Kelly M.D.   clopidogrel (PLAVIX) 75 MG TABS Take 1 Tab by mouth every day. 12/29/12   Evan Kelly M.D.           Past Medical History:   Diagnosis Date   • CAD (coronary artery disease) December 2012    NSTEMI. Coronary angiogram with 60% LAD stenosis, 40% stenosis proximal LAD, 20% stenosis mid circumflex.   • Depression    • Hyperlipidemia    • Hypertension     Borderline   • Psoriasis    • Shortness of breath February 2014    Echocardiogram with normal LV size, LVEF 60-65%, no valvular abnormalities.   • Smoking             reports that she has been smoking. She has never used smokeless tobacco. She reports current alcohol use.             Physical Exam:     BP (!) 98/70 (BP Location: Left arm, Patient Position: Sitting, BP Cuff Size: Adult)   Pulse 77   Temp 37.1 °C (98.7 °F) (Temporal)   Resp 16   Ht 1.702 m (5' 7\")   Wt 92.9 kg (204 lb 12.8 oz)   SpO2 97%   BMI 32.08 kg/m²         GEN: well-appearing, NAD  Head: no focal alopecia, no hair thinning  ENT: no conjunctival icterus or injection, no LAD, no oral ulcers  CV: nml S1, S2, no murmurs, RRR  Pulm: normal chest expansion, speaking in full sentences, CTAB, no wheezing  SIJ:  Bilateral tendner      "   MUSCUSKELETAL:  Enthesitis at left elbow     SHOULDERs:left shoulder with tenderness  ELBOWS:  no tenderness no synovitis  WRISTS:   Right wrist with tenderness and swelling     MCPS:  Bilateral 2nd and 3rd MCPs with tenderness and puffiness  PIPS:  Right 1st-3rd and left 2nd and 3rd PIPs with tenderness and swelling  DIPS: diffuse tenderness no synovitis  KNEES:  no tenderness no synovitis              ANKLES:  no tenderness no synovitis           MTPS:right 2nd MTP with tenderness  IP TOES: no tenderness no synovitis  SKIN: diffuse psoriatic plaques on trunk, right arm with diffuse erythema with papular rash, bilateral lower extremities with diffuse psoriatic plaquenil, diffuse onycholyssis of nails and toe nails              Labs:    Results for orders placed or performed during the hospital encounter of 02/21/14   ECHOCARDIOGRAM COMP W/O CONT   Result Value Ref Range    Eject.Frac. MOD BP 58.22     Eject.Frac. MOD 4C 59.1     Eject.Frac. MOD 2C 59.28          Impression/Plan:  1. Psoriatic arthritis (HCC)  2. Psoriasis  3. High risk medication use  Both skin and joint disease significantly uncontrolled with high disease activity and diffuse involvement of skin. She has responded well to Enbrel in the past thus I do feel it is reasonable to restart, however unfortunately she did receive high doses of MTX and develop liver issues thus I do not feel we should restart at this time.  We will plan to repeat labs with screening labs now and restart Enbrel.  We did discuss if her liver has normalized we may consider adding back a low dose of MTX if needed, or if Enbrel is insufficient to control disease may switch to IL 17 inhibitor.    Today we discussed the risks and benefits of use of anti-TNF therapy including but not limited to risk of lymphoma, infection, MS like reaction, malignacy.  Regular blood work is advised.    Hepatitis B serologies and quantiferon negative 2/2016      Plan:  - Restart Enbrel 50mg SQ  weekly  - Remain off of MTX for now  -Patient had hand x-rays recently and will have sent to us  - CBC WITH DIFFERENTIAL; Future  - Comp Metabolic Panel; Future  - Sed Rate; Future  - CRP QUANTITIVE (NON-CARDIAC); Future  - HEP C VIRUS ANTIBODY; Future  - HEP B SURFACE ANTIGEN; Future  - HEP B CORE AB TOTAL; Future  - G6PD QUANT + RBC; Future  - Quantiferon Gold TB (PPD); Future  - DX-JOINT SURVEY-FEET SINGLE VIEW; Future  - DX-SACROILIAC JOINTS 3+; Future      4. Coronary artery disease due to lipid rich plaque  5. Essential hypertension, benign  Caution with NSAIDs    6. Tobacco use  Advised cessation    7. Chronic bilateral low back pain without sciatica  With significant stiffness and AM stiffness suspect axial involvement of PsA    Plan  - DX-SACROILIAC JOINTS 3+; Future                Patient will be in contact with me for any questions or concerns, will otherwise follow up with her new rheumatologist in 4 months, she will be in contact with me in the mean time for any issues.        Sissy Castillo MD       [phone]

## 2023-04-22 ENCOUNTER — APPOINTMENT (OUTPATIENT)
Dept: RADIOLOGY | Facility: MEDICAL CENTER | Age: 53
DRG: 253 | End: 2023-04-22
Attending: STUDENT IN AN ORGANIZED HEALTH CARE EDUCATION/TRAINING PROGRAM
Payer: COMMERCIAL

## 2023-04-22 ENCOUNTER — HOSPITAL ENCOUNTER (INPATIENT)
Facility: MEDICAL CENTER | Age: 53
LOS: 6 days | DRG: 253 | End: 2023-04-28
Attending: HOSPITALIST | Admitting: STUDENT IN AN ORGANIZED HEALTH CARE EDUCATION/TRAINING PROGRAM
Payer: COMMERCIAL

## 2023-04-22 ENCOUNTER — HOSPITAL ENCOUNTER (OUTPATIENT)
Dept: RADIOLOGY | Facility: MEDICAL CENTER | Age: 53
End: 2023-04-22

## 2023-04-22 DIAGNOSIS — I25.83 CORONARY ARTERY DISEASE DUE TO LIPID RICH PLAQUE: ICD-10-CM

## 2023-04-22 DIAGNOSIS — I89.0 CHRONIC ACQUIRED LYMPHEDEMA: ICD-10-CM

## 2023-04-22 DIAGNOSIS — I77.1 ILIAC ARTERY STENOSIS, RIGHT (HCC): ICD-10-CM

## 2023-04-22 DIAGNOSIS — T81.89XA NON-HEALING SURGICAL WOUND, INITIAL ENCOUNTER: ICD-10-CM

## 2023-04-22 DIAGNOSIS — I10 ESSENTIAL HYPERTENSION, BENIGN: ICD-10-CM

## 2023-04-22 DIAGNOSIS — L03.031 CELLULITIS OF TOE OF RIGHT FOOT: ICD-10-CM

## 2023-04-22 DIAGNOSIS — G89.4 CHRONIC PAIN SYNDROME: ICD-10-CM

## 2023-04-22 DIAGNOSIS — E87.6 HYPOKALEMIA: ICD-10-CM

## 2023-04-22 DIAGNOSIS — I96 GANGRENE (HCC): ICD-10-CM

## 2023-04-22 DIAGNOSIS — E78.5 HYPERLIPIDEMIA, UNSPECIFIED HYPERLIPIDEMIA TYPE: ICD-10-CM

## 2023-04-22 DIAGNOSIS — Z72.0 TOBACCO USE: ICD-10-CM

## 2023-04-22 DIAGNOSIS — I25.10 CORONARY ARTERY DISEASE DUE TO LIPID RICH PLAQUE: ICD-10-CM

## 2023-04-22 PROBLEM — G47.00 INSOMNIA: Status: ACTIVE | Noted: 2023-04-22

## 2023-04-22 PROBLEM — R78.81 BACTEREMIA: Status: ACTIVE | Noted: 2023-04-22

## 2023-04-22 PROBLEM — M79.3 ACUTE PANNICULITIS: Status: ACTIVE | Noted: 2023-04-22

## 2023-04-22 PROBLEM — I73.9 CLAUDICATION OF RIGHT LOWER EXTREMITY (HCC): Status: ACTIVE | Noted: 2023-04-22

## 2023-04-22 PROBLEM — L03.90 CELLULITIS: Status: ACTIVE | Noted: 2023-04-22

## 2023-04-22 PROBLEM — G62.9 NEUROPATHY: Status: ACTIVE | Noted: 2023-04-22

## 2023-04-22 PROBLEM — E66.811 CLASS 1 OBESITY IN ADULT: Status: ACTIVE | Noted: 2023-04-22

## 2023-04-22 PROBLEM — E66.9 CLASS 1 OBESITY IN ADULT: Status: ACTIVE | Noted: 2023-04-22

## 2023-04-22 PROBLEM — D84.9 IMMUNOCOMPROMISED STATE (HCC): Status: ACTIVE | Noted: 2023-04-22

## 2023-04-22 LAB
ALBUMIN SERPL BCP-MCNC: 3.1 G/DL (ref 3.2–4.9)
ALBUMIN/GLOB SERPL: 0.8 G/DL
ALP SERPL-CCNC: 131 U/L (ref 30–99)
ALT SERPL-CCNC: 31 U/L (ref 2–50)
ANION GAP SERPL CALC-SCNC: 10 MMOL/L (ref 7–16)
APTT PPP: 27.4 SEC (ref 24.7–36)
AST SERPL-CCNC: 25 U/L (ref 12–45)
BASOPHILS # BLD AUTO: 0.3 % (ref 0–1.8)
BASOPHILS # BLD: 0.02 K/UL (ref 0–0.12)
BILIRUB SERPL-MCNC: 0.4 MG/DL (ref 0.1–1.5)
BUN SERPL-MCNC: 11 MG/DL (ref 8–22)
CALCIUM ALBUM COR SERPL-MCNC: 9.3 MG/DL (ref 8.5–10.5)
CALCIUM SERPL-MCNC: 8.6 MG/DL (ref 8.5–10.5)
CHLORIDE SERPL-SCNC: 106 MMOL/L (ref 96–112)
CO2 SERPL-SCNC: 24 MMOL/L (ref 20–33)
CORTIS SERPL-MCNC: 7.2 UG/DL (ref 0–23)
CREAT SERPL-MCNC: 0.55 MG/DL (ref 0.5–1.4)
CRP SERPL HS-MCNC: 9.14 MG/DL (ref 0–0.75)
EKG IMPRESSION: NORMAL
EOSINOPHIL # BLD AUTO: 0.18 K/UL (ref 0–0.51)
EOSINOPHIL NFR BLD: 2.6 % (ref 0–6.9)
ERYTHROCYTE [DISTWIDTH] IN BLOOD BY AUTOMATED COUNT: 45.1 FL (ref 35.9–50)
ERYTHROCYTE [SEDIMENTATION RATE] IN BLOOD BY WESTERGREN METHOD: 74 MM/HOUR (ref 0–25)
EST. AVERAGE GLUCOSE BLD GHB EST-MCNC: 114 MG/DL
GFR SERPLBLD CREATININE-BSD FMLA CKD-EPI: 110 ML/MIN/1.73 M 2
GLOBULIN SER CALC-MCNC: 3.7 G/DL (ref 1.9–3.5)
GLUCOSE SERPL-MCNC: 89 MG/DL (ref 65–99)
HBA1C MFR BLD: 5.6 % (ref 4–5.6)
HCT VFR BLD AUTO: 34.1 % (ref 37–47)
HGB BLD-MCNC: 11.2 G/DL (ref 12–16)
IMM GRANULOCYTES # BLD AUTO: 0.02 K/UL (ref 0–0.11)
IMM GRANULOCYTES NFR BLD AUTO: 0.3 % (ref 0–0.9)
INR PPP: 1.19 (ref 0.87–1.13)
LACTATE SERPL-SCNC: 0.7 MMOL/L (ref 0.5–2)
LDH SERPL L TO P-CCNC: 185 U/L (ref 107–266)
LYMPHOCYTES # BLD AUTO: 1.48 K/UL (ref 1–4.8)
LYMPHOCYTES NFR BLD: 21.7 % (ref 22–41)
MCH RBC QN AUTO: 28.6 PG (ref 27–33)
MCHC RBC AUTO-ENTMCNC: 32.8 G/DL (ref 33.6–35)
MCV RBC AUTO: 87 FL (ref 81.4–97.8)
MONOCYTES # BLD AUTO: 0.64 K/UL (ref 0–0.85)
MONOCYTES NFR BLD AUTO: 9.4 % (ref 0–13.4)
NEUTROPHILS # BLD AUTO: 4.49 K/UL (ref 2–7.15)
NEUTROPHILS NFR BLD: 65.7 % (ref 44–72)
NRBC # BLD AUTO: 0 K/UL
NRBC BLD-RTO: 0 /100 WBC
PLATELET # BLD AUTO: 207 K/UL (ref 164–446)
PMV BLD AUTO: 9.4 FL (ref 9–12.9)
POTASSIUM SERPL-SCNC: 3.9 MMOL/L (ref 3.6–5.5)
PROCALCITONIN SERPL-MCNC: 0.06 NG/ML
PROT SERPL-MCNC: 6.8 G/DL (ref 6–8.2)
PROTHROMBIN TIME: 15 SEC (ref 12–14.6)
RBC # BLD AUTO: 3.92 M/UL (ref 4.2–5.4)
SCCMEC + MECA PNL NOSE NAA+PROBE: NEGATIVE
SCCMEC + MECA PNL NOSE NAA+PROBE: NEGATIVE
SODIUM SERPL-SCNC: 140 MMOL/L (ref 135–145)
UFH PPP CHRO-ACNC: <0.1 IU/ML
WBC # BLD AUTO: 6.8 K/UL (ref 4.8–10.8)

## 2023-04-22 PROCEDURE — A9270 NON-COVERED ITEM OR SERVICE: HCPCS | Performed by: NURSE PRACTITIONER

## 2023-04-22 PROCEDURE — 770001 HCHG ROOM/CARE - MED/SURG/GYN PRIV*

## 2023-04-22 PROCEDURE — 85520 HEPARIN ASSAY: CPT

## 2023-04-22 PROCEDURE — 86140 C-REACTIVE PROTEIN: CPT

## 2023-04-22 PROCEDURE — 700102 HCHG RX REV CODE 250 W/ 637 OVERRIDE(OP): Performed by: NURSE PRACTITIONER

## 2023-04-22 PROCEDURE — 71045 X-RAY EXAM CHEST 1 VIEW: CPT

## 2023-04-22 PROCEDURE — 80053 COMPREHEN METABOLIC PANEL: CPT

## 2023-04-22 PROCEDURE — 85652 RBC SED RATE AUTOMATED: CPT

## 2023-04-22 PROCEDURE — 85610 PROTHROMBIN TIME: CPT

## 2023-04-22 PROCEDURE — 87641 MR-STAPH DNA AMP PROBE: CPT

## 2023-04-22 PROCEDURE — 83036 HEMOGLOBIN GLYCOSYLATED A1C: CPT

## 2023-04-22 PROCEDURE — 87640 STAPH A DNA AMP PROBE: CPT

## 2023-04-22 PROCEDURE — 84145 PROCALCITONIN (PCT): CPT

## 2023-04-22 PROCEDURE — 83615 LACTATE (LD) (LDH) ENZYME: CPT

## 2023-04-22 PROCEDURE — 93010 ELECTROCARDIOGRAM REPORT: CPT | Performed by: INTERNAL MEDICINE

## 2023-04-22 PROCEDURE — A9270 NON-COVERED ITEM OR SERVICE: HCPCS | Performed by: STUDENT IN AN ORGANIZED HEALTH CARE EDUCATION/TRAINING PROGRAM

## 2023-04-22 PROCEDURE — 87040 BLOOD CULTURE FOR BACTERIA: CPT | Mod: 91

## 2023-04-22 PROCEDURE — 99223 1ST HOSP IP/OBS HIGH 75: CPT | Performed by: STUDENT IN AN ORGANIZED HEALTH CARE EDUCATION/TRAINING PROGRAM

## 2023-04-22 PROCEDURE — 700102 HCHG RX REV CODE 250 W/ 637 OVERRIDE(OP): Performed by: STUDENT IN AN ORGANIZED HEALTH CARE EDUCATION/TRAINING PROGRAM

## 2023-04-22 PROCEDURE — 87077 CULTURE AEROBIC IDENTIFY: CPT

## 2023-04-22 PROCEDURE — 87070 CULTURE OTHR SPECIMN AEROBIC: CPT

## 2023-04-22 PROCEDURE — 85730 THROMBOPLASTIN TIME PARTIAL: CPT

## 2023-04-22 PROCEDURE — 700111 HCHG RX REV CODE 636 W/ 250 OVERRIDE (IP): Performed by: STUDENT IN AN ORGANIZED HEALTH CARE EDUCATION/TRAINING PROGRAM

## 2023-04-22 PROCEDURE — 82533 TOTAL CORTISOL: CPT

## 2023-04-22 PROCEDURE — 99407 BEHAV CHNG SMOKING > 10 MIN: CPT | Performed by: STUDENT IN AN ORGANIZED HEALTH CARE EDUCATION/TRAINING PROGRAM

## 2023-04-22 PROCEDURE — 700105 HCHG RX REV CODE 258: Performed by: STUDENT IN AN ORGANIZED HEALTH CARE EDUCATION/TRAINING PROGRAM

## 2023-04-22 PROCEDURE — 85025 COMPLETE CBC W/AUTO DIFF WBC: CPT

## 2023-04-22 PROCEDURE — 36415 COLL VENOUS BLD VENIPUNCTURE: CPT

## 2023-04-22 PROCEDURE — 93005 ELECTROCARDIOGRAM TRACING: CPT | Performed by: STUDENT IN AN ORGANIZED HEALTH CARE EDUCATION/TRAINING PROGRAM

## 2023-04-22 PROCEDURE — 87205 SMEAR GRAM STAIN: CPT

## 2023-04-22 PROCEDURE — 83605 ASSAY OF LACTIC ACID: CPT

## 2023-04-22 RX ORDER — HEPARIN SODIUM 1000 [USP'U]/ML
40 INJECTION, SOLUTION INTRAVENOUS; SUBCUTANEOUS PRN
Status: DISCONTINUED | OUTPATIENT
Start: 2023-04-22 | End: 2023-04-22

## 2023-04-22 RX ORDER — GABAPENTIN 100 MG/1
100 CAPSULE ORAL 3 TIMES DAILY
Status: DISCONTINUED | OUTPATIENT
Start: 2023-04-22 | End: 2023-04-22

## 2023-04-22 RX ORDER — IBUPROFEN 800 MG/1
800 TABLET ORAL EVERY 8 HOURS
Status: DISPENSED | OUTPATIENT
Start: 2023-04-22 | End: 2023-04-27

## 2023-04-22 RX ORDER — METOPROLOL SUCCINATE 25 MG/1
25 TABLET, EXTENDED RELEASE ORAL
Status: DISCONTINUED | OUTPATIENT
Start: 2023-04-23 | End: 2023-04-22

## 2023-04-22 RX ORDER — HYDROMORPHONE HYDROCHLORIDE 1 MG/ML
0.5 INJECTION, SOLUTION INTRAMUSCULAR; INTRAVENOUS; SUBCUTANEOUS
Status: DISCONTINUED | OUTPATIENT
Start: 2023-04-22 | End: 2023-04-22

## 2023-04-22 RX ORDER — TIZANIDINE 4 MG/1
4 TABLET ORAL EVERY 6 HOURS PRN
COMMUNITY
Start: 2023-03-29 | End: 2023-06-06

## 2023-04-22 RX ORDER — PROMETHAZINE HYDROCHLORIDE 25 MG/1
12.5-25 TABLET ORAL EVERY 4 HOURS PRN
Status: DISCONTINUED | OUTPATIENT
Start: 2023-04-22 | End: 2023-04-28 | Stop reason: HOSPADM

## 2023-04-22 RX ORDER — PREGABALIN 300 MG/1
300 CAPSULE ORAL 2 TIMES DAILY
COMMUNITY

## 2023-04-22 RX ORDER — BISACODYL 10 MG
10 SUPPOSITORY, RECTAL RECTAL
Status: DISCONTINUED | OUTPATIENT
Start: 2023-04-22 | End: 2023-04-23

## 2023-04-22 RX ORDER — AMOXICILLIN 250 MG
2 CAPSULE ORAL 2 TIMES DAILY
Status: DISCONTINUED | OUTPATIENT
Start: 2023-04-22 | End: 2023-04-23

## 2023-04-22 RX ORDER — ATORVASTATIN CALCIUM 40 MG/1
40 TABLET, FILM COATED ORAL DAILY
Status: DISCONTINUED | OUTPATIENT
Start: 2023-04-23 | End: 2023-04-22

## 2023-04-22 RX ORDER — HYDROXYZINE HYDROCHLORIDE 25 MG/1
25 TABLET, FILM COATED ORAL EVERY 6 HOURS PRN
Status: DISCONTINUED | OUTPATIENT
Start: 2023-04-22 | End: 2023-04-28 | Stop reason: HOSPADM

## 2023-04-22 RX ORDER — OXYCODONE HYDROCHLORIDE 10 MG/1
10 TABLET ORAL EVERY 6 HOURS PRN
COMMUNITY

## 2023-04-22 RX ORDER — AMLODIPINE BESYLATE 2.5 MG/1
2.5 TABLET ORAL DAILY
Status: ON HOLD | COMMUNITY
Start: 2023-04-17 | End: 2023-04-28

## 2023-04-22 RX ORDER — OXYCODONE HYDROCHLORIDE 10 MG/1
10 TABLET ORAL
Status: DISCONTINUED | OUTPATIENT
Start: 2023-04-22 | End: 2023-04-24

## 2023-04-22 RX ORDER — PREGABALIN 150 MG/1
300 CAPSULE ORAL 2 TIMES DAILY
Status: DISCONTINUED | OUTPATIENT
Start: 2023-04-22 | End: 2023-04-28 | Stop reason: HOSPADM

## 2023-04-22 RX ORDER — HEPARIN SODIUM 5000 [USP'U]/100ML
0-30 INJECTION, SOLUTION INTRAVENOUS CONTINUOUS
Status: DISCONTINUED | OUTPATIENT
Start: 2023-04-22 | End: 2023-04-22

## 2023-04-22 RX ORDER — TIZANIDINE 4 MG/1
4 TABLET ORAL EVERY 6 HOURS PRN
Status: DISCONTINUED | OUTPATIENT
Start: 2023-04-22 | End: 2023-04-28 | Stop reason: HOSPADM

## 2023-04-22 RX ORDER — MORPHINE SULFATE 30 MG/1
30 TABLET, FILM COATED, EXTENDED RELEASE ORAL EVERY 12 HOURS
Status: DISCONTINUED | OUTPATIENT
Start: 2023-04-22 | End: 2023-04-28 | Stop reason: HOSPADM

## 2023-04-22 RX ORDER — FUROSEMIDE 20 MG/1
40 TABLET ORAL DAILY
Status: DISCONTINUED | OUTPATIENT
Start: 2023-04-23 | End: 2023-04-28 | Stop reason: HOSPADM

## 2023-04-22 RX ORDER — PROCHLORPERAZINE EDISYLATE 5 MG/ML
5-10 INJECTION INTRAMUSCULAR; INTRAVENOUS EVERY 4 HOURS PRN
Status: DISCONTINUED | OUTPATIENT
Start: 2023-04-22 | End: 2023-04-28 | Stop reason: HOSPADM

## 2023-04-22 RX ORDER — ACETAMINOPHEN 325 MG/1
650 TABLET ORAL EVERY 6 HOURS PRN
Status: DISCONTINUED | OUTPATIENT
Start: 2023-04-22 | End: 2023-04-22

## 2023-04-22 RX ORDER — HYDROMORPHONE HYDROCHLORIDE 1 MG/ML
0.5 INJECTION, SOLUTION INTRAMUSCULAR; INTRAVENOUS; SUBCUTANEOUS
Status: DISCONTINUED | OUTPATIENT
Start: 2023-04-22 | End: 2023-04-24

## 2023-04-22 RX ORDER — ATORVASTATIN CALCIUM 20 MG/1
20 TABLET, FILM COATED ORAL DAILY
Status: ON HOLD | COMMUNITY
Start: 2023-04-17 | End: 2023-04-28 | Stop reason: SDUPTHER

## 2023-04-22 RX ORDER — LABETALOL HYDROCHLORIDE 5 MG/ML
10 INJECTION, SOLUTION INTRAVENOUS EVERY 4 HOURS PRN
Status: DISCONTINUED | OUTPATIENT
Start: 2023-04-22 | End: 2023-04-28 | Stop reason: HOSPADM

## 2023-04-22 RX ORDER — ONDANSETRON 4 MG/1
4 TABLET, ORALLY DISINTEGRATING ORAL EVERY 4 HOURS PRN
Status: DISCONTINUED | OUTPATIENT
Start: 2023-04-22 | End: 2023-04-28 | Stop reason: HOSPADM

## 2023-04-22 RX ORDER — NICOTINE 21 MG/24HR
21 PATCH, TRANSDERMAL 24 HOURS TRANSDERMAL
Status: DISCONTINUED | OUTPATIENT
Start: 2023-04-22 | End: 2023-04-24

## 2023-04-22 RX ORDER — PROMETHAZINE HYDROCHLORIDE 25 MG/1
12.5-25 SUPPOSITORY RECTAL EVERY 4 HOURS PRN
Status: DISCONTINUED | OUTPATIENT
Start: 2023-04-22 | End: 2023-04-28 | Stop reason: HOSPADM

## 2023-04-22 RX ORDER — ACETAMINOPHEN 500 MG
1000 TABLET ORAL EVERY 6 HOURS PRN
Status: DISCONTINUED | OUTPATIENT
Start: 2023-04-27 | End: 2023-04-28 | Stop reason: HOSPADM

## 2023-04-22 RX ORDER — HEPARIN SODIUM 5000 [USP'U]/ML
5000 INJECTION, SOLUTION INTRAVENOUS; SUBCUTANEOUS EVERY 8 HOURS
Status: DISCONTINUED | OUTPATIENT
Start: 2023-04-22 | End: 2023-04-28 | Stop reason: HOSPADM

## 2023-04-22 RX ORDER — ASPIRIN 81 MG/1
81 TABLET, CHEWABLE ORAL DAILY
Status: DISCONTINUED | OUTPATIENT
Start: 2023-04-23 | End: 2023-04-26

## 2023-04-22 RX ORDER — AMLODIPINE BESYLATE 2.5 MG/1
2.5 TABLET ORAL DAILY
Status: DISCONTINUED | OUTPATIENT
Start: 2023-04-23 | End: 2023-04-23

## 2023-04-22 RX ORDER — OXYCODONE HYDROCHLORIDE 5 MG/1
5 TABLET ORAL
Status: DISCONTINUED | OUTPATIENT
Start: 2023-04-22 | End: 2023-04-22

## 2023-04-22 RX ORDER — METOPROLOL TARTRATE 50 MG/1
50 TABLET, FILM COATED ORAL TWICE DAILY
Status: DISCONTINUED | OUTPATIENT
Start: 2023-04-22 | End: 2023-04-28 | Stop reason: HOSPADM

## 2023-04-22 RX ORDER — IBUPROFEN 800 MG/1
800 TABLET ORAL 3 TIMES DAILY PRN
Status: DISCONTINUED | OUTPATIENT
Start: 2023-04-27 | End: 2023-04-28 | Stop reason: HOSPADM

## 2023-04-22 RX ORDER — ACETAMINOPHEN 500 MG
1000 TABLET ORAL EVERY 6 HOURS
Status: DISPENSED | OUTPATIENT
Start: 2023-04-22 | End: 2023-04-27

## 2023-04-22 RX ORDER — TRAZODONE HYDROCHLORIDE 50 MG/1
100 TABLET ORAL
Status: DISCONTINUED | OUTPATIENT
Start: 2023-04-22 | End: 2023-04-22

## 2023-04-22 RX ORDER — SODIUM CHLORIDE 9 MG/ML
INJECTION, SOLUTION INTRAVENOUS CONTINUOUS
Status: DISCONTINUED | OUTPATIENT
Start: 2023-04-22 | End: 2023-04-23 | Stop reason: ALTCHOICE

## 2023-04-22 RX ORDER — ONDANSETRON 2 MG/ML
4 INJECTION INTRAMUSCULAR; INTRAVENOUS EVERY 4 HOURS PRN
Status: DISCONTINUED | OUTPATIENT
Start: 2023-04-22 | End: 2023-04-28 | Stop reason: HOSPADM

## 2023-04-22 RX ORDER — CEPHALEXIN 500 MG/1
500 CAPSULE ORAL 3 TIMES DAILY
Status: ON HOLD | COMMUNITY
Start: 2023-03-28 | End: 2023-04-28

## 2023-04-22 RX ORDER — OXYCODONE HYDROCHLORIDE 10 MG/1
10 TABLET ORAL EVERY 6 HOURS PRN
Status: DISCONTINUED | OUTPATIENT
Start: 2023-04-22 | End: 2023-04-22

## 2023-04-22 RX ORDER — MORPHINE SULFATE 30 MG/1
30 TABLET, FILM COATED, EXTENDED RELEASE ORAL EVERY 12 HOURS
COMMUNITY
End: 2023-06-23

## 2023-04-22 RX ORDER — NALOXONE HYDROCHLORIDE 4 MG/.1ML
1 SPRAY NASAL
COMMUNITY
Start: 2023-03-13 | End: 2023-06-06

## 2023-04-22 RX ORDER — POLYETHYLENE GLYCOL 3350 17 G/17G
1 POWDER, FOR SOLUTION ORAL
Status: DISCONTINUED | OUTPATIENT
Start: 2023-04-22 | End: 2023-04-23

## 2023-04-22 RX ORDER — SODIUM CHLORIDE, SODIUM LACTATE, POTASSIUM CHLORIDE, AND CALCIUM CHLORIDE .6; .31; .03; .02 G/100ML; G/100ML; G/100ML; G/100ML
500 INJECTION, SOLUTION INTRAVENOUS
Status: DISCONTINUED | OUTPATIENT
Start: 2023-04-22 | End: 2023-04-28 | Stop reason: HOSPADM

## 2023-04-22 RX ORDER — ATORVASTATIN CALCIUM 40 MG/1
40 TABLET, FILM COATED ORAL EVERY EVENING
Status: DISCONTINUED | OUTPATIENT
Start: 2023-04-22 | End: 2023-04-28 | Stop reason: HOSPADM

## 2023-04-22 RX ORDER — GUAIFENESIN/DEXTROMETHORPHAN 100-10MG/5
10 SYRUP ORAL EVERY 6 HOURS PRN
Status: DISCONTINUED | OUTPATIENT
Start: 2023-04-22 | End: 2023-04-28 | Stop reason: HOSPADM

## 2023-04-22 RX ORDER — OXYCODONE HYDROCHLORIDE 10 MG/1
10 TABLET ORAL
Status: DISCONTINUED | OUTPATIENT
Start: 2023-04-22 | End: 2023-04-22

## 2023-04-22 RX ORDER — BETAMETHASONE DIPROPIONATE 0.05 %
1 OINTMENT (GRAM) TOPICAL 2 TIMES DAILY PRN
COMMUNITY
End: 2023-06-06

## 2023-04-22 RX ORDER — SODIUM CHLORIDE 9 MG/ML
INJECTION, SOLUTION INTRAVENOUS CONTINUOUS
Status: DISCONTINUED | OUTPATIENT
Start: 2023-04-22 | End: 2023-04-22

## 2023-04-22 RX ORDER — OXYCODONE HYDROCHLORIDE 5 MG/1
5 TABLET ORAL
Status: DISCONTINUED | OUTPATIENT
Start: 2023-04-22 | End: 2023-04-24

## 2023-04-22 RX ORDER — CELECOXIB 200 MG/1
200 CAPSULE ORAL DAILY
Status: DISCONTINUED | OUTPATIENT
Start: 2023-04-23 | End: 2023-04-22

## 2023-04-22 RX ORDER — HYDROXYZINE HYDROCHLORIDE 25 MG/1
25 TABLET, FILM COATED ORAL EVERY 6 HOURS PRN
COMMUNITY
Start: 2023-03-08

## 2023-04-22 RX ADMIN — ACETAMINOPHEN 1000 MG: 500 TABLET, FILM COATED ORAL at 21:31

## 2023-04-22 RX ADMIN — SODIUM CHLORIDE: 9 INJECTION, SOLUTION INTRAVENOUS at 17:42

## 2023-04-22 RX ADMIN — AMPICILLIN AND SULBACTAM 3 G: 1; 2 INJECTION, POWDER, FOR SOLUTION INTRAMUSCULAR; INTRAVENOUS at 17:44

## 2023-04-22 RX ADMIN — NICOTINE TRANSDERMAL SYSTEM 21 MG: 21 PATCH, EXTENDED RELEASE TRANSDERMAL at 18:43

## 2023-04-22 RX ADMIN — SODIUM CHLORIDE: 9 INJECTION, SOLUTION INTRAVENOUS at 18:48

## 2023-04-22 RX ADMIN — OXYCODONE HYDROCHLORIDE 10 MG: 10 TABLET ORAL at 21:31

## 2023-04-22 RX ADMIN — PREGABALIN 300 MG: 150 CAPSULE ORAL at 19:08

## 2023-04-22 RX ADMIN — MORPHINE SULFATE 30 MG: 30 TABLET, FILM COATED, EXTENDED RELEASE ORAL at 19:00

## 2023-04-22 RX ADMIN — ATORVASTATIN CALCIUM 40 MG: 40 TABLET, FILM COATED ORAL at 18:51

## 2023-04-22 RX ADMIN — IBUPROFEN 800 MG: 800 TABLET, FILM COATED ORAL at 21:31

## 2023-04-22 RX ADMIN — HEPARIN SODIUM 5000 UNITS: 5000 INJECTION, SOLUTION INTRAVENOUS; SUBCUTANEOUS at 18:45

## 2023-04-22 RX ADMIN — OXYCODONE HYDROCHLORIDE 10 MG: 10 TABLET ORAL at 18:45

## 2023-04-22 RX ADMIN — VANCOMYCIN HYDROCHLORIDE 1000 MG: 500 INJECTION, POWDER, LYOPHILIZED, FOR SOLUTION INTRAVENOUS at 18:54

## 2023-04-22 ASSESSMENT — ENCOUNTER SYMPTOMS
CHILLS: 1
BLURRED VISION: 0
ABDOMINAL PAIN: 1
DEPRESSION: 0
FEVER: 0
COUGH: 0
WEAKNESS: 1
VOMITING: 0
NECK PAIN: 0
FALLS: 0
PALPITATIONS: 0
DOUBLE VISION: 0
HEARTBURN: 0
MYALGIAS: 1
SHORTNESS OF BREATH: 1
HEADACHES: 0
DIZZINESS: 0
BRUISES/BLEEDS EASILY: 1
NAUSEA: 0

## 2023-04-22 ASSESSMENT — PAIN DESCRIPTION - PAIN TYPE
TYPE: ACUTE PAIN
TYPE: ACUTE PAIN

## 2023-04-22 ASSESSMENT — FIBROSIS 4 INDEX: FIB4 SCORE: 0.85

## 2023-04-22 ASSESSMENT — LIFESTYLE VARIABLES: SUBSTANCE_ABUSE: 0

## 2023-04-22 NOTE — PROGRESS NOTES
[facility]OSPITAL[facility] TRIAGE OFFICER DIRECT ADMISSION REPORT  Transferring facility: [facility]  Transferring physician: Dr alexander    Chief complaint: abdominal pain  Pertinent history & patient course: 52-year-old female obese with abdominal pain/pelvic pain noted to have a right iliac occlusion.  Also has pannus wound/infection.  Culture obtained from pannus results still pending    Cyanotic toes    patient is growing out Gram-positive cocci in her blood cultures       Pertinent imaging & lab results: Positive blood cultures with gram-positive cocci  Code Status: full per transferring provider, I personally verified with the transferring provider patient's code status and the transferring provider has confirmed this with the patient.    Consultants called prior to transfer and pertinent input from consultants: josé miguel  Patient accepted for transfer: Yes  Consultants to be called upon arrival: \hachristie  Admission status: Inpatient.   Floor requested: surgery      Please inform the triage officer upon arrival of the patient to [facility] for assignment of a hospitalist to perform admission.     For any question or concerns regarding the care of this patient, please reach out to the assigned hospitalist.

## 2023-04-22 NOTE — PROGRESS NOTES
Patient arrived to unit. Admitting notified of patient's arrival. VS and weight recorded and documented, allergies verified. Patient declines immediate needs at this time

## 2023-04-23 ENCOUNTER — APPOINTMENT (OUTPATIENT)
Dept: CARDIOLOGY | Facility: MEDICAL CENTER | Age: 53
DRG: 253 | End: 2023-04-23
Attending: STUDENT IN AN ORGANIZED HEALTH CARE EDUCATION/TRAINING PROGRAM
Payer: COMMERCIAL

## 2023-04-23 LAB
ALBUMIN SERPL BCP-MCNC: 3.1 G/DL (ref 3.2–4.9)
ALBUMIN/GLOB SERPL: 0.9 G/DL
ALP SERPL-CCNC: 121 U/L (ref 30–99)
ALT SERPL-CCNC: 27 U/L (ref 2–50)
ANION GAP SERPL CALC-SCNC: 11 MMOL/L (ref 7–16)
APPEARANCE UR: CLEAR
AST SERPL-CCNC: 20 U/L (ref 12–45)
BASOPHILS # BLD AUTO: 0.3 % (ref 0–1.8)
BASOPHILS # BLD: 0.02 K/UL (ref 0–0.12)
BILIRUB SERPL-MCNC: 0.4 MG/DL (ref 0.1–1.5)
BILIRUB UR QL STRIP.AUTO: NEGATIVE
BUN SERPL-MCNC: 10 MG/DL (ref 8–22)
C DIFF DNA SPEC QL NAA+PROBE: NEGATIVE
C DIFF TOX GENS STL QL NAA+PROBE: NEGATIVE
CALCIUM ALBUM COR SERPL-MCNC: 9.1 MG/DL (ref 8.5–10.5)
CALCIUM SERPL-MCNC: 8.4 MG/DL (ref 8.5–10.5)
CHLORIDE SERPL-SCNC: 109 MMOL/L (ref 96–112)
CHOLEST SERPL-MCNC: 62 MG/DL (ref 100–199)
CO2 SERPL-SCNC: 21 MMOL/L (ref 20–33)
COLOR UR: YELLOW
CREAT SERPL-MCNC: 0.5 MG/DL (ref 0.5–1.4)
EOSINOPHIL # BLD AUTO: 0.17 K/UL (ref 0–0.51)
EOSINOPHIL NFR BLD: 2.7 % (ref 0–6.9)
ERYTHROCYTE [DISTWIDTH] IN BLOOD BY AUTOMATED COUNT: 43.5 FL (ref 35.9–50)
GFR SERPLBLD CREATININE-BSD FMLA CKD-EPI: 113 ML/MIN/1.73 M 2
GLOBULIN SER CALC-MCNC: 3.4 G/DL (ref 1.9–3.5)
GLUCOSE SERPL-MCNC: 85 MG/DL (ref 65–99)
GLUCOSE UR STRIP.AUTO-MCNC: NEGATIVE MG/DL
GRAM STN SPEC: NORMAL
GRAM STN SPEC: NORMAL
HCT VFR BLD AUTO: 31.9 % (ref 37–47)
HDLC SERPL-MCNC: 23 MG/DL
HGB BLD-MCNC: 10.5 G/DL (ref 12–16)
IMM GRANULOCYTES # BLD AUTO: 0.02 K/UL (ref 0–0.11)
IMM GRANULOCYTES NFR BLD AUTO: 0.3 % (ref 0–0.9)
KETONES UR STRIP.AUTO-MCNC: NEGATIVE MG/DL
LDLC SERPL CALC-MCNC: 26 MG/DL
LEUKOCYTE ESTERASE UR QL STRIP.AUTO: NEGATIVE
LV EJECT FRACT  99904: 60
LV EJECT FRACT MOD 2C 99903: 56.84
LV EJECT FRACT MOD 4C 99902: 68.23
LV EJECT FRACT MOD BP 99901: 62.1
LYMPHOCYTES # BLD AUTO: 1.47 K/UL (ref 1–4.8)
LYMPHOCYTES NFR BLD: 23.1 % (ref 22–41)
MAGNESIUM SERPL-MCNC: 1.9 MG/DL (ref 1.5–2.5)
MCH RBC QN AUTO: 28.1 PG (ref 27–33)
MCHC RBC AUTO-ENTMCNC: 32.9 G/DL (ref 33.6–35)
MCV RBC AUTO: 85.3 FL (ref 81.4–97.8)
MICRO URNS: NORMAL
MONOCYTES # BLD AUTO: 0.59 K/UL (ref 0–0.85)
MONOCYTES NFR BLD AUTO: 9.3 % (ref 0–13.4)
NEUTROPHILS # BLD AUTO: 4.09 K/UL (ref 2–7.15)
NEUTROPHILS NFR BLD: 64.3 % (ref 44–72)
NITRITE UR QL STRIP.AUTO: NEGATIVE
NRBC # BLD AUTO: 0 K/UL
NRBC BLD-RTO: 0 /100 WBC
PH UR STRIP.AUTO: 7 [PH] (ref 5–8)
PHOSPHATE SERPL-MCNC: 3.8 MG/DL (ref 2.5–4.5)
PLATELET # BLD AUTO: 207 K/UL (ref 164–446)
PMV BLD AUTO: 9.8 FL (ref 9–12.9)
POTASSIUM SERPL-SCNC: 3.8 MMOL/L (ref 3.6–5.5)
PROT SERPL-MCNC: 6.5 G/DL (ref 6–8.2)
PROT UR QL STRIP: NEGATIVE MG/DL
RBC # BLD AUTO: 3.74 M/UL (ref 4.2–5.4)
RBC UR QL AUTO: NEGATIVE
SIGNIFICANT IND 70042: NORMAL
SIGNIFICANT IND 70042: NORMAL
SITE SITE: NORMAL
SITE SITE: NORMAL
SODIUM SERPL-SCNC: 141 MMOL/L (ref 135–145)
SOURCE SOURCE: NORMAL
SOURCE SOURCE: NORMAL
SP GR UR STRIP.AUTO: 1.01
TRIGL SERPL-MCNC: 64 MG/DL (ref 0–149)
UROBILINOGEN UR STRIP.AUTO-MCNC: 0.2 MG/DL
WBC # BLD AUTO: 6.4 K/UL (ref 4.8–10.8)

## 2023-04-23 PROCEDURE — A9270 NON-COVERED ITEM OR SERVICE: HCPCS | Performed by: NURSE PRACTITIONER

## 2023-04-23 PROCEDURE — 700102 HCHG RX REV CODE 250 W/ 637 OVERRIDE(OP): Performed by: NURSE PRACTITIONER

## 2023-04-23 PROCEDURE — 85025 COMPLETE CBC W/AUTO DIFF WBC: CPT

## 2023-04-23 PROCEDURE — 700117 HCHG RX CONTRAST REV CODE 255: Performed by: STUDENT IN AN ORGANIZED HEALTH CARE EDUCATION/TRAINING PROGRAM

## 2023-04-23 PROCEDURE — 99232 SBSQ HOSP IP/OBS MODERATE 35: CPT | Performed by: INTERNAL MEDICINE

## 2023-04-23 PROCEDURE — A9270 NON-COVERED ITEM OR SERVICE: HCPCS | Performed by: STUDENT IN AN ORGANIZED HEALTH CARE EDUCATION/TRAINING PROGRAM

## 2023-04-23 PROCEDURE — 83735 ASSAY OF MAGNESIUM: CPT

## 2023-04-23 PROCEDURE — 700105 HCHG RX REV CODE 258: Performed by: STUDENT IN AN ORGANIZED HEALTH CARE EDUCATION/TRAINING PROGRAM

## 2023-04-23 PROCEDURE — 81003 URINALYSIS AUTO W/O SCOPE: CPT

## 2023-04-23 PROCEDURE — 87086 URINE CULTURE/COLONY COUNT: CPT

## 2023-04-23 PROCEDURE — 302043 TAPE, HYPAFIX: Performed by: INTERNAL MEDICINE

## 2023-04-23 PROCEDURE — 80061 LIPID PANEL: CPT

## 2023-04-23 PROCEDURE — 87493 C DIFF AMPLIFIED PROBE: CPT

## 2023-04-23 PROCEDURE — 700102 HCHG RX REV CODE 250 W/ 637 OVERRIDE(OP): Performed by: STUDENT IN AN ORGANIZED HEALTH CARE EDUCATION/TRAINING PROGRAM

## 2023-04-23 PROCEDURE — 84100 ASSAY OF PHOSPHORUS: CPT

## 2023-04-23 PROCEDURE — 80053 COMPREHEN METABOLIC PANEL: CPT

## 2023-04-23 PROCEDURE — 36415 COLL VENOUS BLD VENIPUNCTURE: CPT

## 2023-04-23 PROCEDURE — 700111 HCHG RX REV CODE 636 W/ 250 OVERRIDE (IP): Performed by: STUDENT IN AN ORGANIZED HEALTH CARE EDUCATION/TRAINING PROGRAM

## 2023-04-23 PROCEDURE — 97602 WOUND(S) CARE NON-SELECTIVE: CPT

## 2023-04-23 PROCEDURE — 700111 HCHG RX REV CODE 636 W/ 250 OVERRIDE (IP): Performed by: NURSE PRACTITIONER

## 2023-04-23 PROCEDURE — A9270 NON-COVERED ITEM OR SERVICE: HCPCS | Performed by: INTERNAL MEDICINE

## 2023-04-23 PROCEDURE — 93306 TTE W/DOPPLER COMPLETE: CPT

## 2023-04-23 PROCEDURE — 93306 TTE W/DOPPLER COMPLETE: CPT | Mod: 26 | Performed by: INTERNAL MEDICINE

## 2023-04-23 PROCEDURE — 700102 HCHG RX REV CODE 250 W/ 637 OVERRIDE(OP): Performed by: INTERNAL MEDICINE

## 2023-04-23 PROCEDURE — 770001 HCHG ROOM/CARE - MED/SURG/GYN PRIV*

## 2023-04-23 RX ORDER — LIDOCAINE HYDROCHLORIDE 40 MG/ML
SOLUTION TOPICAL
Status: DISCONTINUED | OUTPATIENT
Start: 2023-04-23 | End: 2023-04-28 | Stop reason: HOSPADM

## 2023-04-23 RX ORDER — LIDOCAINE HYDROCHLORIDE 20 MG/ML
20 INJECTION, SOLUTION INFILTRATION; PERINEURAL
Status: DISCONTINUED | OUTPATIENT
Start: 2023-04-23 | End: 2023-04-28 | Stop reason: HOSPADM

## 2023-04-23 RX ORDER — LIDOCAINE HYDROCHLORIDE 20 MG/ML
1 JELLY TOPICAL
Status: DISCONTINUED | OUTPATIENT
Start: 2023-04-23 | End: 2023-04-28 | Stop reason: HOSPADM

## 2023-04-23 RX ORDER — AMLODIPINE BESYLATE 10 MG/1
5 TABLET ORAL DAILY
Status: DISCONTINUED | OUTPATIENT
Start: 2023-04-24 | End: 2023-04-28 | Stop reason: HOSPADM

## 2023-04-23 RX ORDER — TRIAMCINOLONE ACETONIDE 1 MG/G
CREAM TOPICAL 2 TIMES DAILY
Status: DISCONTINUED | OUTPATIENT
Start: 2023-04-23 | End: 2023-04-28 | Stop reason: HOSPADM

## 2023-04-23 RX ORDER — LOPERAMIDE HYDROCHLORIDE 2 MG/1
2-4 CAPSULE ORAL 4 TIMES DAILY PRN
Status: DISCONTINUED | OUTPATIENT
Start: 2023-04-23 | End: 2023-04-28 | Stop reason: HOSPADM

## 2023-04-23 RX ADMIN — ASPIRIN 81 MG 81 MG: 81 TABLET ORAL at 05:44

## 2023-04-23 RX ADMIN — IBUPROFEN 800 MG: 800 TABLET, FILM COATED ORAL at 20:47

## 2023-04-23 RX ADMIN — TRIAMCINOLONE ACETONIDE: 1 CREAM TOPICAL at 18:04

## 2023-04-23 RX ADMIN — HEPARIN SODIUM 5000 UNITS: 5000 INJECTION, SOLUTION INTRAVENOUS; SUBCUTANEOUS at 20:48

## 2023-04-23 RX ADMIN — OXYCODONE HYDROCHLORIDE 10 MG: 10 TABLET ORAL at 00:58

## 2023-04-23 RX ADMIN — HYDROMORPHONE HYDROCHLORIDE 0.5 MG: 1 INJECTION, SOLUTION INTRAMUSCULAR; INTRAVENOUS; SUBCUTANEOUS at 22:45

## 2023-04-23 RX ADMIN — OXYCODONE HYDROCHLORIDE 10 MG: 10 TABLET ORAL at 06:15

## 2023-04-23 RX ADMIN — OXYCODONE HYDROCHLORIDE 10 MG: 10 TABLET ORAL at 18:00

## 2023-04-23 RX ADMIN — AMPICILLIN AND SULBACTAM 3 G: 1; 2 INJECTION, POWDER, FOR SOLUTION INTRAMUSCULAR; INTRAVENOUS at 14:13

## 2023-04-23 RX ADMIN — DOCUSATE SODIUM 50 MG AND SENNOSIDES 8.6 MG 2 TABLET: 8.6; 5 TABLET, FILM COATED ORAL at 05:43

## 2023-04-23 RX ADMIN — OXYCODONE HYDROCHLORIDE 10 MG: 10 TABLET ORAL at 14:33

## 2023-04-23 RX ADMIN — VANCOMYCIN HYDROCHLORIDE 1000 MG: 500 INJECTION, POWDER, LYOPHILIZED, FOR SOLUTION INTRAVENOUS at 03:09

## 2023-04-23 RX ADMIN — PREGABALIN 300 MG: 150 CAPSULE ORAL at 05:43

## 2023-04-23 RX ADMIN — OXYCODONE HYDROCHLORIDE 10 MG: 10 TABLET ORAL at 21:24

## 2023-04-23 RX ADMIN — MORPHINE SULFATE 30 MG: 30 TABLET, FILM COATED, EXTENDED RELEASE ORAL at 17:58

## 2023-04-23 RX ADMIN — AMPICILLIN AND SULBACTAM 3 G: 1; 2 INJECTION, POWDER, FOR SOLUTION INTRAMUSCULAR; INTRAVENOUS at 18:03

## 2023-04-23 RX ADMIN — IBUPROFEN 800 MG: 800 TABLET, FILM COATED ORAL at 05:44

## 2023-04-23 RX ADMIN — AMPICILLIN AND SULBACTAM 3 G: 1; 2 INJECTION, POWDER, FOR SOLUTION INTRAMUSCULAR; INTRAVENOUS at 00:53

## 2023-04-23 RX ADMIN — IBUPROFEN 800 MG: 800 TABLET, FILM COATED ORAL at 14:11

## 2023-04-23 RX ADMIN — ACETAMINOPHEN 1000 MG: 500 TABLET, FILM COATED ORAL at 08:39

## 2023-04-23 RX ADMIN — AMLODIPINE BESYLATE 2.5 MG: 2.5 TABLET ORAL at 05:52

## 2023-04-23 RX ADMIN — FUROSEMIDE 40 MG: 20 TABLET ORAL at 05:44

## 2023-04-23 RX ADMIN — HEPARIN SODIUM 5000 UNITS: 5000 INJECTION, SOLUTION INTRAVENOUS; SUBCUTANEOUS at 14:11

## 2023-04-23 RX ADMIN — HUMAN ALBUMIN MICROSPHERES AND PERFLUTREN 3 ML: 10; .22 INJECTION, SOLUTION INTRAVENOUS at 12:00

## 2023-04-23 RX ADMIN — AMPICILLIN AND SULBACTAM 3 G: 1; 2 INJECTION, POWDER, FOR SOLUTION INTRAMUSCULAR; INTRAVENOUS at 05:43

## 2023-04-23 RX ADMIN — HYDROMORPHONE HYDROCHLORIDE 0.5 MG: 1 INJECTION, SOLUTION INTRAMUSCULAR; INTRAVENOUS; SUBCUTANEOUS at 11:17

## 2023-04-23 RX ADMIN — MORPHINE SULFATE 30 MG: 30 TABLET, FILM COATED, EXTENDED RELEASE ORAL at 05:43

## 2023-04-23 RX ADMIN — ACETAMINOPHEN 1000 MG: 500 TABLET, FILM COATED ORAL at 14:10

## 2023-04-23 RX ADMIN — OXYCODONE HYDROCHLORIDE 10 MG: 10 TABLET ORAL at 10:10

## 2023-04-23 RX ADMIN — ATORVASTATIN CALCIUM 40 MG: 40 TABLET, FILM COATED ORAL at 17:58

## 2023-04-23 RX ADMIN — PREGABALIN 300 MG: 150 CAPSULE ORAL at 17:58

## 2023-04-23 RX ADMIN — VANCOMYCIN HYDROCHLORIDE 1000 MG: 500 INJECTION, POWDER, LYOPHILIZED, FOR SOLUTION INTRAVENOUS at 11:10

## 2023-04-23 RX ADMIN — HEPARIN SODIUM 5000 UNITS: 5000 INJECTION, SOLUTION INTRAVENOUS; SUBCUTANEOUS at 05:45

## 2023-04-23 RX ADMIN — ACETAMINOPHEN 1000 MG: 500 TABLET, FILM COATED ORAL at 20:48

## 2023-04-23 ASSESSMENT — COGNITIVE AND FUNCTIONAL STATUS - GENERAL
SUGGESTED CMS G CODE MODIFIER MOBILITY: CH
SUGGESTED CMS G CODE MODIFIER DAILY ACTIVITY: CH
MOBILITY SCORE: 24
DAILY ACTIVITIY SCORE: 24

## 2023-04-23 ASSESSMENT — PAIN DESCRIPTION - PAIN TYPE
TYPE: ACUTE PAIN
TYPE: ACUTE PAIN;CHRONIC PAIN
TYPE: ACUTE PAIN
TYPE: ACUTE PAIN;CHRONIC PAIN

## 2023-04-23 ASSESSMENT — LIFESTYLE VARIABLES
HAVE YOU EVER FELT YOU SHOULD CUT DOWN ON YOUR DRINKING: NO
ALCOHOL_USE: NO
TOTAL SCORE: 0
HAVE PEOPLE ANNOYED YOU BY CRITICIZING YOUR DRINKING: NO
HOW MANY TIMES IN THE PAST YEAR HAVE YOU HAD 5 OR MORE DRINKS IN A DAY: 0
TOTAL SCORE: 0
AVERAGE NUMBER OF DAYS PER WEEK YOU HAVE A DRINK CONTAINING ALCOHOL: 0
CONSUMPTION TOTAL: NEGATIVE
TOTAL SCORE: 0
EVER HAD A DRINK FIRST THING IN THE MORNING TO STEADY YOUR NERVES TO GET RID OF A HANGOVER: NO
EVER FELT BAD OR GUILTY ABOUT YOUR DRINKING: NO
ON A TYPICAL DAY WHEN YOU DRINK ALCOHOL HOW MANY DRINKS DO YOU HAVE: 0

## 2023-04-23 ASSESSMENT — ENCOUNTER SYMPTOMS
WEAKNESS: 0
DIARRHEA: 1
SHORTNESS OF BREATH: 0
NAUSEA: 0
MYALGIAS: 1
ABDOMINAL PAIN: 0
VOMITING: 0

## 2023-04-23 ASSESSMENT — PATIENT HEALTH QUESTIONNAIRE - PHQ9
1. LITTLE INTEREST OR PLEASURE IN DOING THINGS: NOT AT ALL
SUM OF ALL RESPONSES TO PHQ9 QUESTIONS 1 AND 2: 0
2. FEELING DOWN, DEPRESSED, IRRITABLE, OR HOPELESS: NOT AT ALL

## 2023-04-23 ASSESSMENT — FIBROSIS 4 INDEX: FIB4 SCORE: 1.11

## 2023-04-23 NOTE — ASSESSMENT & PLAN NOTE
Noted on CTA  Transferred from [facility] to [facility] [place] for higher level of care  VTE ppx with heparin SQ  Status post stenting to the right iliac artery by vascular surgery, discussed with surgery she had excellent flow postprocedure  Patient started on aspirin and Plavix  Outpatient vascular follow-up

## 2023-04-23 NOTE — ASSESSMENT & PLAN NOTE
Abnormal BENI noted on arterial duplex  ASA/statin  Vascular surgery was consulted, stent placed today 426 with good flow  Patient started on aspirin and Plavix

## 2023-04-23 NOTE — PROGRESS NOTES
4 Eyes Skin Assessment Completed by ORLANDO Whiting and ORLANDO Montoya.    Head WDL  Ears WDL  Nose WDL  Mouth WDL  Neck WDL  Breast/Chest WDL  Shoulder Blades WDL  Spine WDL  (R) Arm/Elbow/Hand WDL  (L) Arm/Elbow/Hand WDL  Abdomen: R pannus wound, photographed and LDA documented, wound consult placed  Groin WDL  Scrotum/Coccyx/Buttocks WDL  (R) Leg RLE redness,dusky  (L) Leg LLE redness, dusky  (R) Heel/Foot/Toe: R necrotic toe wounds #1 and #2, photographed and LDA documented, wound consultation ordered  (L) Heel/Foot/Toe WDL          Devices In Places 20g PIV RAC, scds      Interventions In Place: pt ambulatory up self. Chronic wounds POA, wound consultation ordered per protocol    Possible Skin Injury Yes    Pictures Uploaded Into Epic Yes  Wound Consult Placed Yes  RN Wound Prevention Protocol Ordered n/a

## 2023-04-23 NOTE — PROGRESS NOTES
Report received from Henok HERNANDEZ RN, assumed care at 0700.  Pt is A0X4, and responds appropriately.  Pt declines any SOB, chest pain, new onset of numbness/ tingling at this time.  Pt rates pain at 2/10, on a scale of 1-10, pt declines pain medication at this time.  Pt is voiding adequatly and without hesitancy in the toilet.  Pt has + flatus, + bowel sounds, last BM PTA.  Pt ambulates independently with daughter.   Pt is tolerating a cardiac diet, pt denies any nausea/vomiting at this time.   Plan of care discussed, all questions answered. Explained importance of oral care. Call light is within reach, treaded slipper socks on, bed in lowest/ locked position, hourly rounding in place, all needs met at this time.

## 2023-04-23 NOTE — PROGRESS NOTES
Pharmacy Vancomycin Kinetics Note for 4/22/2023     52 y.o. female on Vancomycin day # 2     Vancomycin Indication (AUC Dosing): Skin/skin structure infection    Provider specified end date: 04/27/23    Active Antibiotics (From admission, onward)      Ordered     Ordering Provider       Sat Apr 22, 2023  5:18 PM    04/22/23 1718  vancomycin (VANCOCIN) 1,000 mg in  mL IVPB  (vancomycin (VANCOCIN) IV (LD + Maintenance))  EVERY 8 HOURS         Nikunj Simon M.D.       Sat Apr 22, 2023  4:40 PM    04/22/23 1640  ampicillin/sulbactam (UNASYN) 3 g in  mL IVPB  EVERY 6 HOURS         Nikunj Simon M.D.    04/22/23 1640  MD Alert...Vancomycin per Pharmacy  PHARMACY TO DOSE        Question:  Indication(s) for vancomycin?  Answer:  Skin and soft tissue infection    Nikunj Simon M.D.          Dosing Weight: 100 kg (220 lb 7.4 oz)    Admission History: Admitted on 4/22/2023 for Iliac artery stenosis, right (HCC) [I77.1]  Pertinent history: Txf'd from [facility] with concern for pannus infx. Empiric vanco/unaysn resumed upon txf.    Allergies:     Patient has no known allergies.     Pertinent cultures to date:     Results       Procedure Component Value Units Date/Time    CULTURE WOUND W/ GRAM STAIN [[id number]]     Order Status: No result Specimen: Wound from Right Foot     S. Aureus By PCR, Nasal Complete [[id number]]     Order Status: No result Specimen: Respirate     Urine Culture [[id number]]     Order Status: No result Specimen: Urine     Culture Wound W/ Gram Stain [[id number]]     Order Status: No result Specimen: Wound from Abdominal     Blood Culture [[id number]]     Order Status: No result Specimen: Blood from Peripheral     Blood Culture [[id number]]     Order Status: No result Specimen: Blood from Peripheral     Urinalysis [[id number]]     Order Status: No result Specimen: Urine             Labs:     CrCl cannot be calculated (Patient's most recent lab result is older than the maximum 7 days  "allowed.).  Recent Labs     23  1639   WBC 6.8   NEUTSPOLYS 65.70     No results for input(s): BUN, CREATININE, ALBUMIN in the last 72 hours.  No intake or output data in the 24 hours ending 23 1719   /75   Pulse (!) 56   Temp 36.3 °C (97.3 °F) (Temporal)   Resp 12   Ht 1.702 m (5' 7.01\")   Wt 100 kg (220 lb 14.4 oz)   SpO2 94%  Temp (24hrs), Av.3 °C (97.3 °F), Min:36.3 °C (97.3 °F), Max:36.3 °C (97.3 °F)      List concerns for Vancomycin clearance:     Obesity;Nephrotoxic drugs    Pharmacokinetics:    AUC kinetics:   Ke (hr ^-1): 0.104 hr^-1  Half life: 6.66 hr  Clearance: 5.537  Estimated TDD: 2768.5  Estimated Dose: 1004  Estimated interval: 8.7    A/P:     -  Vancomycin dose: 1000 mg q8h    -  Next vancomycin level(s): steady state    -  Predicted vancomycin AUC from initial AUC test calculator: 542 mg·hr/L    -  Comments: Txf'd from [facility] with concern for pannus infx. Empiric vanco/unaysn started  @ OSH, resumed upon txf (today is day #2 of therapy). Per OSH records - subtherapeutic trough of 10.1 @ 0100 this morning on vancomycin 1500 mg q12h, however this was obtained prior to steady state. Dose was increased to 1250 mg q8h, with the first dose being this morning @ 0930 . Appears to be significantly clearing vanco, likely 2' renal fx. Adjusted dosing to 1000 mg q8h for a predicted AUC of 542 (goal 400-600). Will need prompt level at steady state. OSH wcx pending, Bcx's +GPCs. Follow cx's to aid in de-escalation if warranted. Pharmacy will continue following.      Lance Carrasquillo, PharmD    "

## 2023-04-23 NOTE — ASSESSMENT & PLAN NOTE
Due to biologic treatment for psoriasis, hold during infection  Continue antibiotic care, ID is following appreciate their help  IV Unasyn to linezolid, end date 5/6/2023

## 2023-04-23 NOTE — PROGRESS NOTES
[facility] Medicine Daily Progress Note    Date of Service  4/23/2023    Chief Complaint  Lorene Woodard is a 52 y.o. female admitted 4/22/2023 with foot infection    Hospital Course  51 yo woman with history of CAD, immunocompromise state due to psoriasis on biologic, hypertension, hyperlipidemia, COPD, tobacco abuse who went to [place] ER with right foot swelling and redness after box falling onto the foot and she developed a poor healing wound in Jan 2023. She was admitted to [facility] on 4/20 for gangrenous right foot/cellulitis, acute panniculitis with cellulitis of lower abdominal wall. She was admitted to medicine service and was treated with Unasyn and vancomycin.  Patient also noted to have gram-positive cocci in blood cultures. She had CTA that showed occluded right iliac artery with collateral flow, BENI concerning for stenosis. Dr. Kaplan was consulted and recommended transfer to [facility].    Interval Problem Update  Unasyn and vanc  A1c 5.6%  MRSA screen is neg  Foot wound, abd wound, and blood cultures pending  TTE pending  HR 50s, metop held  Been having new diarrhea since she was hospitalized.  C. difficile testing ordered she denies abdominal pain or nausea vomiting.  She has ongoing pain to her right foot but is fairly controlled  Blood cultures from OSH still showing G PC, will call them again tomorrow    I have discussed this patient's plan of care and discharge plan at IDT rounds today with Case Management, Nursing, Nursing leadership, and other members of the IDT team.    Consultants/Specialty  vascular surgery    Code Status  Full Code    Disposition  Patient is not medically cleared for discharge.   Anticipate discharge to to home with close outpatient follow-up.  I have placed the appropriate orders for post-discharge needs.    Review of Systems  Review of Systems   Constitutional:  Positive for malaise/fatigue.   Respiratory:  Negative for shortness of breath.    Cardiovascular:  Positive for  leg swelling. Negative for chest pain.   Gastrointestinal:  Positive for diarrhea. Negative for abdominal pain, nausea and vomiting.   Genitourinary:  Negative for dysuria.   Musculoskeletal:  Positive for myalgias.   Skin:  Positive for rash.   Neurological:  Negative for weakness.      Physical Exam  Temp:  [36.3 °C (97.3 °F)-36.9 °C (98.4 °F)] 36.5 °C (97.7 °F)  Pulse:  [49-60] 49  Resp:  [12-18] 16  BP: (118-144)/(58-75) 144/70  SpO2:  [90 %-95 %] 93 %    Physical Exam  Vitals and nursing note reviewed.   Constitutional:       Appearance: She is not toxic-appearing or diaphoretic.   HENT:      Head: Normocephalic.      Mouth/Throat:      Mouth: Mucous membranes are moist.   Eyes:      General:         Right eye: No discharge.         Left eye: No discharge.   Cardiovascular:      Rate and Rhythm: Normal rate and regular rhythm.   Pulmonary:      Effort: Pulmonary effort is normal. No respiratory distress.      Breath sounds: No wheezing or rales.   Abdominal:      Palpations: Abdomen is soft.      Tenderness: There is no abdominal tenderness.   Musculoskeletal:      Cervical back: Neck supple.      Right lower leg: Edema present.      Left lower leg: Edema present.   Skin:     General: Skin is warm and dry.      Comments: Right foot, first and second toe with necrosis, no drainage seen, erythema up to mid foot, tenderness  Small area of ulceration to abdomen, mild clear drainage, minimal erythema  Scaling plaques to left lower leg   Neurological:      Mental Status: She is alert and oriented to person, place, and time.       Fluids    Intake/Output Summary (Last 24 hours) at 4/23/2023 1417  Last data filed at 4/23/2023 0900  Gross per 24 hour   Intake 480 ml   Output 400 ml   Net 80 ml       Laboratory  Recent Labs     04/22/23  1639 04/23/23  0033   WBC 6.8 6.4   RBC 3.92* 3.74*   HEMOGLOBIN 11.2* 10.5*   HEMATOCRIT 34.1* 31.9*   MCV 87.0 85.3   MCH 28.6 28.1   MCHC 32.8* 32.9*   RDW 45.1 43.5   PLATELETCT 207  207   MPV 9.4 9.8     Recent Labs     04/22/23  1639 04/23/23  0033   SODIUM 140 141   POTASSIUM 3.9 3.8   CHLORIDE 106 109   CO2 24 21   GLUCOSE 89 85   BUN 11 10   CREATININE 0.55 0.50   CALCIUM 8.6 8.4*     Recent Labs     04/22/23  1639   APTT 27.4   INR 1.19*         Recent Labs     04/23/23  0033   TRIGLYCERIDE 64   HDL 23*   LDL 26       Imaging  EC-ECHOCARDIOGRAM COMPLETE W/ CONT         DX-CHEST-LIMITED (1 VIEW)   Final Result      No evidence of acute cardiopulmonary process.      US-EXTREMITY ARTERY LOWER BILAT W/BENI (COMBO)    (Results Pending)        Assessment/Plan  * Iliac artery stenosis, right (HCC)- (present on admission)  Assessment & Plan  Noted on CTA  Transferred from [facility] to [facility] for higher level of care  VTE ppx with heparin SQ  Vascular surgery consulted, n.p.o. midnight for surgery tomorrow    Chronic pain syndrome  Assessment & Plan  Continue home pain meds -- MS contin, Oxycodone, Lyrica, Zanaflex  Patient will Tylenol and ibuprofen ordered for her pain    Nonhealing surgical wound  Assessment & Plan  Wound care consulted    Chronic acquired lymphedema  Assessment & Plan  Continue home lasix    Neuropathy  Assessment & Plan  Lyrica    Insomnia  Assessment & Plan  PRN trazodone    Class 1 obesity in adult  Assessment & Plan  Diet and lifestyle modification  Body mass index is 34.59 kg/m².      Claudication of right lower extremity (HCC)  Assessment & Plan  Abnormal BENI noted on arterial duplex  ASA/statin  Vascular surgery was consulted, plan for surgery Monday.  N.p.o. midnight  Continue heparin subcu      Cellulitis  Assessment & Plan  Of right foot and abdominal wall    Gangrene (HCC)  Assessment & Plan  Right foot/toes gangre  Has underlying PAD/PVD, tobacco abuse    Acute panniculitis  Assessment & Plan  Abdominal wall cellulitis/panniculitis  Continue Unasyn and vancomycin  Follow cultures from OSH and culture here  Wound care consulted    Immunocompromised state  (HCC)  Assessment & Plan  Due to biologic treatment for psoriasis, hold during infection    Bacteremia  Assessment & Plan  Noted to GPC bacteremia at [place] 4/20, reports today still shows GPC.  Follow-up tomorrow  Repeat blood cultures so far no growth  TTE pending  Will need ID consult depending on result of culture    Psoriatic arthritis (HCC)- (present on admission)  Assessment & Plan  On biologic agent with Enbrel, hold during infection  Followed by Rheumatology outpt    Tobacco use- (present on admission)  Assessment & Plan  Received smoking cessation counseling    Essential hypertension, benign- (present on admission)  Assessment & Plan  Bradycardic, metoprolol held  She is hypertensive  Continue Lasix, increase amlodipine from 2.5 to 5 mg    CAD (coronary artery disease)- (present on admission)  Assessment & Plan  Stable, continue aspirin and statin         VTE prophylaxis: heparin ppx    I have performed a physical exam and reviewed and updated ROS and Plan today (4/23/2023). In review of yesterday's note (4/22/2023), there are no changes except as documented above.

## 2023-04-23 NOTE — ASSESSMENT & PLAN NOTE
Bradycardic, metoprolol held  She is hypertensive  Continue Lasix, increase amlodipine from 2.5 to 5 mg  Continue to monitor continue current medication plan

## 2023-04-23 NOTE — ASSESSMENT & PLAN NOTE
Continue home pain meds -- MS contin, Oxycodone, Lyrica, Zanaflex  Patient will Tylenol and ibuprofen ordered for her pain  Patient is having increased foot pain after stent placement likely from reperfusion I have increased her oxycodone

## 2023-04-23 NOTE — ASSESSMENT & PLAN NOTE
Wound care is following appreciate their help  Outpatient wound appointment made to follow toe necrosis

## 2023-04-23 NOTE — ASSESSMENT & PLAN NOTE
Right foot/toes gangre  Has underlying PAD/PVD, tobacco abuse  Foot wound culture is growing GAS, surgery and infectious disease following  Continue Unasyn plan for total 2-week course, changed to Zyvox on discharge to complete course  Status post iliac stent by vascular surgery today with good flow, started on aspirin and Plavix  No surgical plan for necrosis on the distal toes, patient would benefit from outpatient follow-up with wound care I have placed a referral and discussed with inpatient wound care who are in agreement  Outpatient wound care follow-up appointment has been made for May 4 at 2:00

## 2023-04-23 NOTE — PROGRESS NOTES
Urine collected and sent to lab for processing (UA, culture, drug screen)    MRSA PCR nares swab collected and sent for processing    R pannus Abd wound swab collected and sent for culture processing    R toes necrotic wound swab collected and sent for culture processing    Wounds photographed and uploaded into chart, LDA's opened, wound consult ordered per protocol  2 RN skin assessment complete  Pain medicated per MAR  IV abx infusing per MAR  Call light in reach, all needs met at this time

## 2023-04-23 NOTE — ASSESSMENT & PLAN NOTE
[place] 4/20 growing GAS  Repeat blood cultures so far no growth  TTE negative for endocarditis  I discussed with ID Dr. Dewitt, discontinue Unasyn and start oral Zyvox to complete total week course, end date 5/6/2023

## 2023-04-23 NOTE — ASSESSMENT & PLAN NOTE
Abdominal wall cellulitis/panniculitis, area appears to be healing on exam  Unasyn transition to Zyvox  Abdominal wound cultures so far no growth  Doing well outpatient wound referral has been placed, first appointment is May 4, 2023

## 2023-04-23 NOTE — ASSESSMENT & PLAN NOTE
Of right foot and abdominal wall  Improving IV Unasyn has been changed to oral linezolid with an end date of 5/6/2023 after discussion with ID

## 2023-04-23 NOTE — ASSESSMENT & PLAN NOTE
On biologic agent with Enbrel, hold during infection  Followed by Rheumatology outpt  Will need outpatient follow-up to determine when this medication is safe to resume

## 2023-04-23 NOTE — CARE PLAN
Problem: Knowledge Deficit - Standard  Goal: Patient and family/care givers will demonstrate understanding of plan of care, disease process/condition, diagnostic tests and medications  Description: Target End Date:  1-3 days or as soon as patient condition allows    Document in Patient Education    1.  Patient and family/caregiver oriented to unit, equipment, visitation policy and means for communicating concern  2.  Complete/review Learning Assessment  3.  Assess knowledge level of disease process/condition, treatment plan, diagnostic tests and medications  4.  Explain disease process/condition, treatment plan, diagnostic tests and medications  Outcome: Progressing     Problem: Pain - Standard  Goal: Alleviation of pain or a reduction in pain to the patient’s comfort goal  Description: Target End Date:  Prior to discharge or change in level of care    Document on Vitals flowsheet    1.  Document pain using the appropriate pain scale per order or unit policy  2.  Educate and implement non-pharmacologic comfort measures (i.e. relaxation, distraction, massage, cold/heat therapy, etc.)  3.  Pain management medications as ordered  4.  Reassess pain after pain med administration per policy  5.  If opiods administered assess patient's response to pain medication is appropriate per POSS sedation scale  6.  Follow pain management plan developed in collaboration with patient and interdisciplinary team (including palliative care or pain specialists if applicable)  Outcome: Progressing     The patient is Stable - Low risk of patient condition declining or worsening    Shift Goals  Clinical Goals: IV abx, cultures  Patient Goals: pain control    Progress made toward(s) clinical / shift goals:  Pain controlled on orals. IV abx on board, Vascular sx consulting    Patient is not progressing towards the following goals:

## 2023-04-23 NOTE — PROGRESS NOTES
Med rec completed per patient at bedside.  Allergies reviewed with patient. ANDERS.  On 3/28/2023 patient was prescribed a 10 day course of cephalexin, which she states that she finished.    Patient unsure of the strength of her furosemide. UNABLE to verify strength of this medication with patient's home pharmacy, [facility] in [place] ([phone]), as the pharmacy is closed at this time and does not reopen until Monday.

## 2023-04-23 NOTE — WOUND TEAM
[facility]  Inpatient Services  Initial Wound and Skin Care Evaluation    Admission Date: 4/22/2023     Last order of IP CONSULT TO WOUND CARE was found on 4/23/2023 from Hospital Encounter on 4/22/2023     HPI, PMH, SH: Reviewed    No past surgical history on file.  Social History     Tobacco Use    Smoking status: Every Day     Packs/day: 0.00     Types: Cigarettes    Smokeless tobacco: Never   Substance Use Topics    Alcohol use: Yes     No chief complaint on file.    Diagnosis: Iliac artery stenosis, right (HCC) [I77.1]    Unit where seen by Wound Team: T433/02     WOUND CONSULT/FOLLOW UP RELATED TO:  ABDOMEN & Right Foot     WOUND HISTORY:  Pt is a 52yr old female admitted on 4/22/23 related to pain to the RLE. Pt states she dropped a box on her right foot back on January 11th while at work. Pt has been seeing a podiatrist and had an appointment with her podiatrist on Monday 4/24/23 however the pain to her foot became to much and she began noticing redness to the area. Pt therefore presented to the ER. Pt also states 3 weeks ago she developed a wound to her right pannus/abdomen area she is unclear how it began but it is extremely painful to touch.     WOUND ASSESSMENT/LDA  Wound 04/22/23 Full Thickness Wound Abdomen;Pannus Right (Active)   Wound Image     04/23/23 1100   Site Assessment Pink;Red;Painful    Periwound Assessment Clean;Dry;Intact;Pink    Margins Attached edges;Defined edges    Closure Adhesive bandage    Drainage Amount Scant    Drainage Description Serosanguineous    Treatments Cleansed;Site care;Offloading    Wound Cleansing Foam Cleanser/Washcloth    Periwound Protectant Skin Protectant Wipes to Periwound    Dressing Cleansing/Solutions Not Applicable    Dressing Options Petrolatum Gauze (yellow);Silicone Adhesive Foam    Dressing Changed New    Dressing Status Clean;Intact;Dry    Dressing Change/Treatment Frequency Daily, and As Needed    NEXT Dressing Change/Treatment Date  04/24/23    NEXT Weekly Photo (Inpatient Only) 04/30/23    Non-staged Wound Description Full thickness    Wound Length (cm) 2 cm    Wound Width (cm) 1.5 cm    Wound Depth (cm) 1 cm    Wound Surface Area (cm^2) 3 cm^2    Wound Volume (cm^3) 3 cm^3    Shape Circular    Wound Odor None    Pulses N/A    Exposed Structures WILDER;None    Number of days: 1       Wound 04/22/23 Soft Tissue Necrosis Toe, Hallux;Toe, 2nd Right Necrotic right toes 1 and 2 (Active)   Wound Image     04/23/23 1100   Site Assessment Black;Red;Dark edges    Periwound Assessment Pink;Red;Painful    Margins Undefined edges    Closure Open to air    Drainage Amount None    Treatments Site care    Wound Cleansing Not Applicable    Periwound Protectant Not Applicable    Dressing Cleansing/Solutions 3% Betadine    Dressing Options Open to Air    Dressing Changed New    Dressing Status Clean;Intact;Dry    Dressing Change/Treatment Frequency Every Shift, and As Needed    NEXT Dressing Change/Treatment Date 04/23/23    NEXT Weekly Photo (Inpatient Only) 04/30/23    Non-staged Wound Description Full thickness    Shape Irregular    Wound Odor None    Pulses Right;DP;Doppler    Exposed Structures WILDER    WOUND NURSE ONLY - Time Spent with Patient (mins) 60    Number of days: 1      Vascular:    BENI:   No results found.    Lab Values:    Lab Results   Component Value Date/Time    WBC 6.4 04/23/2023 12:33 AM    RBC 3.74 (L) 04/23/2023 12:33 AM    HEMOGLOBIN 10.5 (L) 04/23/2023 12:33 AM    HEMATOCRIT 31.9 (L) 04/23/2023 12:33 AM    CREACTPROT 9.14 (H) 04/22/2023 04:39 PM    SEDRATEWES 74 (H) 04/22/2023 04:39 PM    HBA1C 5.6 04/22/2023 04:39 PM      Culture Results show:  No results found for this or any previous visit (from the past 720 hour(s)).    Pain Level/Medicated:  Pain to abdomen, minimal sensation to the right foot       INTERVENTIONS BY WOUND TEAM:  Chart and images reviewed. Discussed with bedside RN. All areas of concern (based on picture review, LDA  review and discussion with bedside RN) have been thoroughly assessed. Documentation of areas based on significant findings. This RN in to assess patient. Performed standard wound care which includes appropriate positioning, dressing removal and non-selective debridement. Pictures and measurements obtained weekly if/when required.         ABDOMEN  Preparation for Dressing removal: NA MILAGROS  Non-selectively Debrided with:  moist warm washcloth and no rinse foam soap  Sharp debridement: NA  Priya wound: Cleansed with moist warm washcloth and no rinse foam soap, Prepped with no sting  Primary Dressing: Xeroform  Secondary (Outer) Dressing: Silicone adhesive foam and interdry cloth         RIGHT TOES  Preparation for Dressing removal: NA MILAGROS  Non-selectively Debrided with:  moist warm washcloth and no rinse foam soap  Sharp debridement: NA  Priya wound: Cleansed with moist warm washcloth and no rinse foam soap, Prepped with NA  Primary Dressing: Betadine   Secondary (Outer) Dressing: MILAGROS Heels floated with pillows    Advanced Wound Care Discharge Planning  Number of Clinicians necessary to complete wound care: 1  Is patient requiring IV pain medications for dressing changes: no  Length of time for dressing change 30 min. (This does not include chart review, pre-medication time, set up, clean up or time spent charting.)    Interdisciplinary consultation: Patient, Bedside RN, Fred SARMIENTO (Wound RN)    EVALUATION / RATIONALE FOR TREATMENT:  Most Recent Date:  04/23/23: Pt with small full thickness wound to the abdomen of unknown etiology. Wound is extremely painful and therefore xeroform (yellow) applied to provide an occlusive dressing that incorporates bacteriostatic protection. Aquacel ag would have been beneficial but this RN had concern regarding dressing sticking to wound bed. Pts right toes black and necrotic. Unable to palpate pulses. BENI ordered. Wounds to be painted with betadine. Pt is on IV ABX.     Goals: Steady  decrease in wound area and depth weekly.    WOUND TEAM PLAN OF CARE ([X] for frequency of wound follow up,):   Nursing to follow dressing orders written for wound care. Contact wound team if area fails to progress, deteriorates or with any questions/concerns if something comes up before next scheduled follow up (See below as to whether wound is following and frequency of wound follow up)  Dressing changes by wound team:                   Follow up 3 times weekly:                NPWT change 3 times weekly:     Follow up 1-2 times weekly:    X  Follow up Bi-Monthly:           Follow up Monthly (High Risk):                        Follow up as needed:     Other (explain):     NURSING PLAN OF CARE ORDERS (X):  Dressing changes: See Dressing Care orders: X  Skin care: See Skin Care orders:   RN Prevention Protocol: X  Rectal tube care: See Rectal Tube Care orders:   Other orders:    RSKIN:   CURRENTLY IN PLACE (X), APPLIED THIS VISIT (A), ORDERED (O):   Q shift Alfredo:  X  Q shift pressure point assessments:  X    Surface/Positioning   Standard Mattress/Trauma Bed    X      Low Airloss          ICU Low Airloss   Bariatric SILVINO     Waffle cushion        Waffle Overlay          Reposition q 2 hours      TAPs Turning system     Z Francesco Pillow     Offloading/Redistribution   Sacral Offloading Dressing (Silicone dressing)     Heel Offloading Dressing (Silicone dressing)    Ordered     Heel float boots (Prevalon boot)             Float Heels off Bed with Pillows    Applied       Respiratory Room Air  Silicone O2 tubing         Gray Foam Ear protectors     Cannula fixation Device (Tender )          High flow offloading Clip    Elastic head band offloading device      Anchorfast                                                         Trach with Optifoam split foam             Containment/Moisture Prevention Continent    Rectal tube or BMS    Purwick/Condom Cath        Li Catheter    Barrier wipes           Barrier paste        Antifungal tx      Interdry        Mobilization       Up to chair   X     Ambulate    X  PT/OT      Nutrition       Dietician        Diabetes Education      PO   X  TF     TPN     NPO   # days     Other        Anticipated discharge plans:   LTACH:        SNF/Rehab:                  Home Health Care:           Outpatient Wound Center:            Self/Family Care:        Other:            X Podiatrist      Vac Discharge Needs:   Vac Discharge plan is purely a recommendation from wound team and not a requirement for discharge unless otherwise stated by physician.  Not Applicable Pt not on a wound vac:     X  Regular Vac while inpatient, alternative dressing at DC:        Regular Vac in use and continued at DC:            Reg. Vac w/ Skin Sub/Biologic in use. Will need to be changed 2x wkly:      Veraflo Vac while inpatient, ok to transition to Regular Vac on Discharge (Bedside RN to Clamp small instillation tubing at time of DC):           Veraflo Vac while inpatient, would benefit from remaining on Veraflo Vac upon discharge:

## 2023-04-24 LAB
ANION GAP SERPL CALC-SCNC: 12 MMOL/L (ref 7–16)
BASOPHILS # BLD AUTO: 0.5 % (ref 0–1.8)
BASOPHILS # BLD: 0.03 K/UL (ref 0–0.12)
BUN SERPL-MCNC: 9 MG/DL (ref 8–22)
CALCIUM SERPL-MCNC: 8.7 MG/DL (ref 8.5–10.5)
CHLORIDE SERPL-SCNC: 110 MMOL/L (ref 96–112)
CO2 SERPL-SCNC: 22 MMOL/L (ref 20–33)
CREAT SERPL-MCNC: 0.59 MG/DL (ref 0.5–1.4)
EOSINOPHIL # BLD AUTO: 0.22 K/UL (ref 0–0.51)
EOSINOPHIL NFR BLD: 3.9 % (ref 0–6.9)
ERYTHROCYTE [DISTWIDTH] IN BLOOD BY AUTOMATED COUNT: 43 FL (ref 35.9–50)
GFR SERPLBLD CREATININE-BSD FMLA CKD-EPI: 109 ML/MIN/1.73 M 2
GLUCOSE SERPL-MCNC: 84 MG/DL (ref 65–99)
HCT VFR BLD AUTO: 34.4 % (ref 37–47)
HGB BLD-MCNC: 11.3 G/DL (ref 12–16)
IMM GRANULOCYTES # BLD AUTO: 0.02 K/UL (ref 0–0.11)
IMM GRANULOCYTES NFR BLD AUTO: 0.4 % (ref 0–0.9)
LYMPHOCYTES # BLD AUTO: 1.7 K/UL (ref 1–4.8)
LYMPHOCYTES NFR BLD: 29.8 % (ref 22–41)
MCH RBC QN AUTO: 28.2 PG (ref 27–33)
MCHC RBC AUTO-ENTMCNC: 32.8 G/DL (ref 33.6–35)
MCV RBC AUTO: 85.8 FL (ref 81.4–97.8)
MONOCYTES # BLD AUTO: 0.48 K/UL (ref 0–0.85)
MONOCYTES NFR BLD AUTO: 8.4 % (ref 0–13.4)
NEUTROPHILS # BLD AUTO: 3.26 K/UL (ref 2–7.15)
NEUTROPHILS NFR BLD: 57 % (ref 44–72)
NRBC # BLD AUTO: 0 K/UL
NRBC BLD-RTO: 0 /100 WBC
PLATELET # BLD AUTO: 240 K/UL (ref 164–446)
PMV BLD AUTO: 9.4 FL (ref 9–12.9)
POTASSIUM SERPL-SCNC: 3.8 MMOL/L (ref 3.6–5.5)
RBC # BLD AUTO: 4.01 M/UL (ref 4.2–5.4)
SODIUM SERPL-SCNC: 144 MMOL/L (ref 135–145)
WBC # BLD AUTO: 5.7 K/UL (ref 4.8–10.8)

## 2023-04-24 PROCEDURE — 700102 HCHG RX REV CODE 250 W/ 637 OVERRIDE(OP): Performed by: INTERNAL MEDICINE

## 2023-04-24 PROCEDURE — 700111 HCHG RX REV CODE 636 W/ 250 OVERRIDE (IP): Performed by: INTERNAL MEDICINE

## 2023-04-24 PROCEDURE — 99223 1ST HOSP IP/OBS HIGH 75: CPT | Performed by: INTERNAL MEDICINE

## 2023-04-24 PROCEDURE — 700111 HCHG RX REV CODE 636 W/ 250 OVERRIDE (IP): Performed by: STUDENT IN AN ORGANIZED HEALTH CARE EDUCATION/TRAINING PROGRAM

## 2023-04-24 PROCEDURE — A9270 NON-COVERED ITEM OR SERVICE: HCPCS | Performed by: NURSE PRACTITIONER

## 2023-04-24 PROCEDURE — 700102 HCHG RX REV CODE 250 W/ 637 OVERRIDE(OP): Performed by: NURSE PRACTITIONER

## 2023-04-24 PROCEDURE — 700105 HCHG RX REV CODE 258: Performed by: STUDENT IN AN ORGANIZED HEALTH CARE EDUCATION/TRAINING PROGRAM

## 2023-04-24 PROCEDURE — A9270 NON-COVERED ITEM OR SERVICE: HCPCS | Performed by: INTERNAL MEDICINE

## 2023-04-24 PROCEDURE — 80048 BASIC METABOLIC PNL TOTAL CA: CPT

## 2023-04-24 PROCEDURE — 770001 HCHG ROOM/CARE - MED/SURG/GYN PRIV*

## 2023-04-24 PROCEDURE — 36415 COLL VENOUS BLD VENIPUNCTURE: CPT

## 2023-04-24 PROCEDURE — 85025 COMPLETE CBC W/AUTO DIFF WBC: CPT

## 2023-04-24 PROCEDURE — 700105 HCHG RX REV CODE 258: Performed by: INTERNAL MEDICINE

## 2023-04-24 PROCEDURE — 700102 HCHG RX REV CODE 250 W/ 637 OVERRIDE(OP): Performed by: STUDENT IN AN ORGANIZED HEALTH CARE EDUCATION/TRAINING PROGRAM

## 2023-04-24 PROCEDURE — A9270 NON-COVERED ITEM OR SERVICE: HCPCS | Performed by: STUDENT IN AN ORGANIZED HEALTH CARE EDUCATION/TRAINING PROGRAM

## 2023-04-24 PROCEDURE — 99232 SBSQ HOSP IP/OBS MODERATE 35: CPT | Performed by: INTERNAL MEDICINE

## 2023-04-24 RX ORDER — HYDROMORPHONE HYDROCHLORIDE 1 MG/ML
1 INJECTION, SOLUTION INTRAMUSCULAR; INTRAVENOUS; SUBCUTANEOUS
Status: DISCONTINUED | OUTPATIENT
Start: 2023-04-24 | End: 2023-04-26

## 2023-04-24 RX ORDER — OXYCODONE HYDROCHLORIDE 5 MG/1
5 TABLET ORAL
Status: DISCONTINUED | OUTPATIENT
Start: 2023-04-24 | End: 2023-04-26

## 2023-04-24 RX ORDER — OXYCODONE HYDROCHLORIDE 10 MG/1
10 TABLET ORAL
Status: DISCONTINUED | OUTPATIENT
Start: 2023-04-24 | End: 2023-04-26

## 2023-04-24 RX ORDER — NICOTINE 21 MG/24HR
21 PATCH, TRANSDERMAL 24 HOURS TRANSDERMAL
Status: DISCONTINUED | OUTPATIENT
Start: 2023-04-24 | End: 2023-04-28 | Stop reason: HOSPADM

## 2023-04-24 RX ADMIN — IBUPROFEN 800 MG: 800 TABLET, FILM COATED ORAL at 12:10

## 2023-04-24 RX ADMIN — LOPERAMIDE HYDROCHLORIDE 2 MG: 2 CAPSULE ORAL at 10:01

## 2023-04-24 RX ADMIN — ATORVASTATIN CALCIUM 40 MG: 40 TABLET, FILM COATED ORAL at 17:22

## 2023-04-24 RX ADMIN — OXYCODONE HYDROCHLORIDE 10 MG: 10 TABLET ORAL at 08:51

## 2023-04-24 RX ADMIN — ACETAMINOPHEN 1000 MG: 500 TABLET, FILM COATED ORAL at 08:51

## 2023-04-24 RX ADMIN — IBUPROFEN 800 MG: 800 TABLET, FILM COATED ORAL at 19:37

## 2023-04-24 RX ADMIN — HYDROMORPHONE HYDROCHLORIDE 1 MG: 1 INJECTION, SOLUTION INTRAMUSCULAR; INTRAVENOUS; SUBCUTANEOUS at 13:08

## 2023-04-24 RX ADMIN — OXYCODONE HYDROCHLORIDE 10 MG: 10 TABLET ORAL at 15:01

## 2023-04-24 RX ADMIN — HEPARIN SODIUM 5000 UNITS: 5000 INJECTION, SOLUTION INTRAVENOUS; SUBCUTANEOUS at 21:09

## 2023-04-24 RX ADMIN — AMPICILLIN AND SULBACTAM 3 G: 1; 2 INJECTION, POWDER, FOR SOLUTION INTRAMUSCULAR; INTRAVENOUS at 04:12

## 2023-04-24 RX ADMIN — HYDROMORPHONE HYDROCHLORIDE 1 MG: 1 INJECTION, SOLUTION INTRAMUSCULAR; INTRAVENOUS; SUBCUTANEOUS at 16:40

## 2023-04-24 RX ADMIN — AMLODIPINE BESYLATE 5 MG: 10 TABLET ORAL at 05:08

## 2023-04-24 RX ADMIN — MORPHINE SULFATE 30 MG: 30 TABLET, FILM COATED, EXTENDED RELEASE ORAL at 17:22

## 2023-04-24 RX ADMIN — HYDROMORPHONE HYDROCHLORIDE 1 MG: 1 INJECTION, SOLUTION INTRAMUSCULAR; INTRAVENOUS; SUBCUTANEOUS at 19:38

## 2023-04-24 RX ADMIN — TRIAMCINOLONE ACETONIDE: 1 CREAM TOPICAL at 04:14

## 2023-04-24 RX ADMIN — AMPICILLIN AND SULBACTAM 3 G: 1; 2 INJECTION, POWDER, FOR SOLUTION INTRAMUSCULAR; INTRAVENOUS at 12:11

## 2023-04-24 RX ADMIN — ASPIRIN 81 MG 81 MG: 81 TABLET ORAL at 04:14

## 2023-04-24 RX ADMIN — AMPICILLIN AND SULBACTAM 3 G: 1; 2 INJECTION, POWDER, FOR SOLUTION INTRAMUSCULAR; INTRAVENOUS at 17:28

## 2023-04-24 RX ADMIN — ACETAMINOPHEN 1000 MG: 500 TABLET, FILM COATED ORAL at 19:37

## 2023-04-24 RX ADMIN — ACETAMINOPHEN 1000 MG: 500 TABLET, FILM COATED ORAL at 15:01

## 2023-04-24 RX ADMIN — OXYCODONE HYDROCHLORIDE 10 MG: 10 TABLET ORAL at 00:40

## 2023-04-24 RX ADMIN — OXYCODONE HYDROCHLORIDE 10 MG: 10 TABLET ORAL at 21:09

## 2023-04-24 RX ADMIN — HEPARIN SODIUM 5000 UNITS: 5000 INJECTION, SOLUTION INTRAVENOUS; SUBCUTANEOUS at 15:01

## 2023-04-24 RX ADMIN — FUROSEMIDE 40 MG: 20 TABLET ORAL at 04:14

## 2023-04-24 RX ADMIN — OXYCODONE HYDROCHLORIDE 10 MG: 10 TABLET ORAL at 18:12

## 2023-04-24 RX ADMIN — OXYCODONE HYDROCHLORIDE 10 MG: 10 TABLET ORAL at 12:04

## 2023-04-24 RX ADMIN — PREGABALIN 300 MG: 150 CAPSULE ORAL at 17:22

## 2023-04-24 RX ADMIN — TRIAMCINOLONE ACETONIDE: 1 CREAM TOPICAL at 17:22

## 2023-04-24 RX ADMIN — HYDROMORPHONE HYDROCHLORIDE 1 MG: 1 INJECTION, SOLUTION INTRAMUSCULAR; INTRAVENOUS; SUBCUTANEOUS at 09:57

## 2023-04-24 RX ADMIN — PREGABALIN 300 MG: 150 CAPSULE ORAL at 04:15

## 2023-04-24 RX ADMIN — NICOTINE TRANSDERMAL SYSTEM 21 MG: 21 PATCH, EXTENDED RELEASE TRANSDERMAL at 12:04

## 2023-04-24 RX ADMIN — IBUPROFEN 800 MG: 800 TABLET, FILM COATED ORAL at 04:14

## 2023-04-24 RX ADMIN — MORPHINE SULFATE 30 MG: 30 TABLET, FILM COATED, EXTENDED RELEASE ORAL at 04:15

## 2023-04-24 RX ADMIN — AMPICILLIN AND SULBACTAM 3 G: 1; 2 INJECTION, POWDER, FOR SOLUTION INTRAMUSCULAR; INTRAVENOUS at 00:37

## 2023-04-24 RX ADMIN — HEPARIN SODIUM 5000 UNITS: 5000 INJECTION, SOLUTION INTRAVENOUS; SUBCUTANEOUS at 04:14

## 2023-04-24 RX ADMIN — OXYCODONE HYDROCHLORIDE 10 MG: 10 TABLET ORAL at 04:15

## 2023-04-24 RX ADMIN — HYDROMORPHONE HYDROCHLORIDE 1 MG: 1 INJECTION, SOLUTION INTRAMUSCULAR; INTRAVENOUS; SUBCUTANEOUS at 22:27

## 2023-04-24 ASSESSMENT — PAIN DESCRIPTION - PAIN TYPE
TYPE: ACUTE PAIN

## 2023-04-24 ASSESSMENT — ENCOUNTER SYMPTOMS
DIARRHEA: 1
VOMITING: 0
NAUSEA: 0
MYALGIAS: 1
ABDOMINAL PAIN: 0
WEAKNESS: 0
SHORTNESS OF BREATH: 0

## 2023-04-24 NOTE — CONSULTS
Vascular Surgery          New Patient Consultation    Patient:Lorene Woodard  MRN:[id number]    Date: 4/24/2023    Referring Provider: Rasheeda Pederson M.d.    Consulting Physician: Marlon Kaplan MD  _____________________________________________________    Reason for consultation:  PAOD and right toe gangrene    HPI:  This is a 52 y.o. female transferred in from [place] with gangrene of the tips of right toes 1 and 2. Duplex shows arterial insufficiency of the right lower extremity and CTA shows occlusion of the right common iliac artery.  Patient is alert and conversant in no distress. No previous vascular procedures.    Past Medical History:   Diagnosis Date    CAD (coronary artery disease) December 2012    NSTEMI. Coronary angiogram with 60% LAD stenosis, 40% stenosis proximal LAD, 20% stenosis mid circumflex.    Depression     Hyperlipidemia     Hypertension     Borderline    Psoriasis     Shortness of breath February 2014    Echocardiogram with normal LV size, LVEF 60-65%, no valvular abnormalities.    Smoking        No past surgical history on file.    Current Facility-Administered Medications   Medication Dose Route Frequency Provider Last Rate Last Admin    oxyCODONE immediate-release (ROXICODONE) tablet 5 mg  5 mg Oral Q3HRS PRN Rasheeda Pederson M.D.        Or    oxyCODONE immediate release (ROXICODONE) tablet 10 mg  10 mg Oral Q3HRS PRN Rasheeda Pederson M.D.        Or    HYDROmorphone (Dilaudid) injection 1 mg  1 mg Intravenous Q3HRS PRN Rasheeda Pederson M.D.        triamcinolone acetonide (KENALOG) 0.1 % cream   Topical BID Rasheeda Pederson M.D.   Given at 04/24/23 0414    lidocaine 2 % jelly 1 Application.  1 Application. Topical QDAY PRN Rasheeda Pederson M.D.        lidocaine (XYLOCAINE) 2 % injection 20 mL  20 mL Topical QDAY PRN Rasheeda Pederson M.D.        Or    lidocaine (XYLOCAINE) 4 % topical solution   Topical QDAY PRN Rasheeda Pederson M.D.        amLODIPine (NORVASC) tablet 5 mg  5 mg Oral DAILY Rasheeda Pederson  M.D.   5 mg at 04/24/23 0508    loperamide (IMODIUM) capsule 2-4 mg  2-4 mg Oral 4X/DAY PRN Rasheeda Pederson M.D.        lactated ringers infusion (BOLUS)  500 mL Intravenous Once PRN Nikunj Simon M.D.        labetalol (NORMODYNE/TRANDATE) injection 10 mg  10 mg Intravenous Q4HRS PRN Nikunj Simon M.D.        ondansetron (ZOFRAN) syringe/vial injection 4 mg  4 mg Intravenous Q4HRS PRN Nikunj Simon M.D.        ondansetron (ZOFRAN ODT) dispertab 4 mg  4 mg Oral Q4HRS PRN Nikunj Simon M.D.        promethazine (PHENERGAN) tablet 12.5-25 mg  12.5-25 mg Oral Q4HRS PRN Nikunj Simon M.D.        promethazine (PHENERGAN) suppository 12.5-25 mg  12.5-25 mg Rectal Q4HRS PRN Nikunj Simon M.D.        prochlorperazine (COMPAZINE) injection 5-10 mg  5-10 mg Intravenous Q4HRS PRN Nikunj Simon M.D.        guaiFENesin dextromethorphan (ROBITUSSIN DM) 100-10 MG/5ML syrup 10 mL  10 mL Oral Q6HRS PRN Nikunj Simon M.D.        heparin injection 5,000 Units  5,000 Units Subcutaneous Q8HRS Nikunj Simon M.D.   5,000 Units at 04/24/23 0414    ampicillin/sulbactam (UNASYN) 3 g in  mL IVPB  3 g Intravenous Q6HRS Breanne Dewitt M.D.   Stopped at 04/24/23 0442    nicotine (NICODERM) 21 MG/24HR 21 mg  21 mg Transdermal Daily-0600 Nikunj Simon M.D.   21 mg at 04/22/23 1843    And    nicotine polacrilex (NICORETTE) 2 MG piece 2 mg  2 mg Oral Q HOUR PRN Nikunj Simon M.D.        furosemide (LASIX) tablet 40 mg  40 mg Oral DAILY Nikunj Simon M.D.   40 mg at 04/24/23 0414    aspirin (ASA) chewable tab 81 mg  81 mg Oral DAILY Nikunj Simon M.D.   81 mg at 04/24/23 0414    atorvastatin (LIPITOR) tablet 40 mg  40 mg Oral Q EVENING Nikunj Simon M.D.   40 mg at 04/23/23 1758    hydrOXYzine HCl (ATARAX) tablet 25 mg  25 mg Oral Q6HRS PRN Nikunj Simon M.D.        morphine ER (MS CONTIN) tablet 30 mg  30 mg Oral Q12HRS Nikunj Simon M.D.   30 mg at 04/24/23 0415    pregabalin (LYRICA) capsule 300 mg  300 mg Oral BID Nikunj Simon M.D.   300 mg  at 04/24/23 0415    tizanidine (ZANAFLEX) tablet 4 mg  4 mg Oral Q6HRS PRN Nikunj Simon M.D.        [Held by provider] metoprolol tartrate (LOPRESSOR) tablet 50 mg  50 mg Oral TWICE DAILY Nikunj Simon M.D.        Pharmacy Consult Request ...Pain Management Review 1 Each  1 Each Other PHARMACY TO DOSE Geraldene R Ralleca-Llaguno, A.P.R.N.        acetaminophen (TYLENOL) tablet 1,000 mg  1,000 mg Oral Q6HR Geraldene R Ralleca-Llaguno, A.P.R.N.   1,000 mg at 04/24/23 0851    Followed by    [START ON 4/27/2023] acetaminophen (TYLENOL) tablet 1,000 mg  1,000 mg Oral Q6HRS PRN Geraldene R Ralleca-Llaguno, A.P.R.N.        ibuprofen (MOTRIN) tablet 800 mg  800 mg Oral Q8HR Geraldene R Ralleca-Llaguno, A.P.R.N.   800 mg at 04/24/23 0414    Followed by    [START ON 4/27/2023] ibuprofen (MOTRIN) tablet 800 mg  800 mg Oral TID PRN Geraldene R Ralleca-Llaguno, A.P.R.N.           Social History     Socioeconomic History    Marital status: Single     Spouse name: Not on file    Number of children: Not on file    Years of education: Not on file    Highest education level: Not on file   Occupational History    Not on file   Tobacco Use    Smoking status: Every Day     Packs/day: 0.00     Types: Cigarettes    Smokeless tobacco: Never   Substance and Sexual Activity    Alcohol use: Yes    Drug use: Not on file    Sexual activity: Not on file   Other Topics Concern    Not on file   Social History Narrative    Not on file     Social Determinants of Health     Financial Resource Strain: Not on file   Food Insecurity: Not on file   Transportation Needs: Not on file   Physical Activity: Not on file   Stress: Not on file   Social Connections: Not on file   Intimate Partner Violence: Not on file   Housing Stability: Not on file       Family History   Problem Relation Age of Onset    Heart Disease Sister        Allergies:  Patient has no known allergies.    Review of Systems:    Constitutional: Negative for fever or chills  HENT:   Negative  "for hearing loss or tinnitus    Eyes:    Negative for blurred vision or loss of vision  Respiratory:  Negative for cough or hemoptysis  Cardiac:  Negative for chest pain or palpitations  Vascular:  Negative for claudication, Negative for rest pain  Gastrointestinal: Negative for vomiting or abdominal pain     Negative for hematochezia or melena   Genitourinary: Negative for dysuria or hematuria   Musculoskeletal: Negative for myalgias or acute joint pain  Skin:   Negative for itching or rash  Neurological:  Negative for dizziness or headaches     Negative for speech disturbance     Negative for extremity weakness or paresthesias  Endo/Heme:  Negative for easy bruising or bleeding    Physical Exam:  /69   Pulse (!) 53   Temp 36.6 °C (97.9 °F) (Temporal)   Resp 17   Ht 1.702 m (5' 7.01\")   Wt 100 kg (220 lb 14.4 oz)   LMP 03/13/2017   SpO2 94%   BMI 34.59 kg/m²     Constitutional: Alert, oriented, no acute distress  HEENT:  Normocephalic and atraumatic, EOMI  Neck:   Supple, no JVD,   Cardiovascular: Regular rate and rhythm,   Pulmonary:  Good air entry bilaterally,    Abdominal:  Soft, non-tender, non-distended       Musculoskeletal: No tenderness, no deformity  Neurological:  CN II-XII grossly intact, no focal deficits  Skin:   Skin is warm and dry. No rash noted.  Vascular exam:    RUE: warm, cap refill<3 seconds, no edema, no tissue loss,   LUE: warm, cap refill<3 seconds, no edema, no tissue loss,   RLE: warm, cap refill<3 seconds, no edema, gangrene of tips of toes 1 and 2  LLE: warm, cap refill<3 seconds, no edema, no tissue loss,       Labs:  Recent Labs     04/22/23  1639 04/23/23  0033 04/24/23  0036   WBC 6.8 6.4 5.7   RBC 3.92* 3.74* 4.01*   HEMOGLOBIN 11.2* 10.5* 11.3*   HEMATOCRIT 34.1* 31.9* 34.4*   MCV 87.0 85.3 85.8   MCH 28.6 28.1 28.2   MCHC 32.8* 32.9* 32.8*   RDW 45.1 43.5 43.0   PLATELETCT 207 207 240   MPV 9.4 9.8 9.4     Recent Labs     04/22/23  1639 04/23/23  0033 04/24/23  0036 "   SODIUM 140 141 144   POTASSIUM 3.9 3.8 3.8   CHLORIDE 106 109 110   CO2 24 21 22   GLUCOSE 89 85 84   BUN 11 10 9   CREATININE 0.55 0.50 0.59   CALCIUM 8.6 8.4* 8.7     Recent Labs     04/22/23  1639   APTT 27.4   INR 1.19*     Recent Labs     04/22/23  1639 04/23/23  0033   ASTSGOT 25 20   ALTSGPT 31 27   TBILIRUBIN 0.4 0.4   ALKPHOSPHAT 131* 121*   GLOBULIN 3.7* 3.4   INR 1.19*  --          Radiology:  Duplex shows arterial insufficiency of the right lower extremity    CTA shows occlusion of the right common iliac artery. Runoff vessels appear patent       Assessment/Plan:   -  PAOD with right toe gangrene    Patient will need revascularization to heal her toes.  Most likely a right iliac stent is all she needs. If that is unsuccessful then the alternative would be a fem-fem bypass. Attempted to schedule iliac stenting today but there is no availability in IR today so will plan for tomorrow.  NPO except meds after midnight.  Continue empiric antibiotics as patient was bacteremic at outside facility.        Marlon Kaplan MD  [facility]   Vo[place] preferred or call my office [phone]  __________________________________________________________________  Patient:Lorene Woodard   MRN:[id number]   CSN:[id number]

## 2023-04-24 NOTE — CARE PLAN
Problem: Knowledge Deficit - Standard  Goal: Patient and family/care givers will demonstrate understanding of plan of care, disease process/condition, diagnostic tests and medications  Description: Target End Date:  1-3 days or as soon as patient condition allows    Document in Patient Education    1.  Patient and family/caregiver oriented to unit, equipment, visitation policy and means for communicating concern  2.  Complete/review Learning Assessment  3.  Assess knowledge level of disease process/condition, treatment plan, diagnostic tests and medications  4.  Explain disease process/condition, treatment plan, diagnostic tests and medications  Outcome: Progressing     Problem: Pain - Standard  Goal: Alleviation of pain or a reduction in pain to the patient’s comfort goal  Description: Target End Date:  Prior to discharge or change in level of care    Document on Vitals flowsheet    1.  Document pain using the appropriate pain scale per order or unit policy  2.  Educate and implement non-pharmacologic comfort measures (i.e. relaxation, distraction, massage, cold/heat therapy, etc.)  3.  Pain management medications as ordered  4.  Reassess pain after pain med administration per policy  5.  If opiods administered assess patient's response to pain medication is appropriate per POSS sedation scale  6.  Follow pain management plan developed in collaboration with patient and interdisciplinary team (including palliative care or pain specialists if applicable)  Outcome: Progressing     The patient is Stable - Low risk of patient condition declining or worsening    Shift Goals  Clinical Goals: vascular consult  Patient Goals: pain control    Progress made toward(s) clinical / shift goals:  Rosauar/Henry consulted. Pain controlled with scheduled and PRN medication    Patient is not progressing towards the following goals:

## 2023-04-24 NOTE — PROGRESS NOTES
[facility] Medicine Daily Progress Note    Date of Service  4/24/2023    Chief Complaint  Lorene Woodard is a 52 y.o. female admitted 4/22/2023 with foot infection    Hospital Course  51 yo woman with history of CAD, immunocompromise state due to psoriasis on biologic, hypertension, hyperlipidemia, COPD, tobacco abuse who went to [place] ER with right foot swelling and redness after box falling onto the foot and she developed a poor healing wound in Jan 2023. She was admitted to [facility] on 4/20 for gangrenous right foot/cellulitis, acute panniculitis with cellulitis of lower abdominal wall. She was admitted to medicine service and was treated with Unasyn and vancomycin.  Patient also noted to have gram-positive cocci in blood cultures. She had CTA that showed occluded right iliac artery with collateral flow, BENI concerning for stenosis. Dr. Kaplna was consulted and recommended transfer to [facility].    Interval Problem Update  C diff was neg.  Her diarrhea has improved.  She has a lot of pain in her foot, I will increase IV Dilaudid breakthrough dose  Blood culture from 4/20 showing strep pyogenes, resistance to azithro and erythro (scanned in Epic). Blood culture here has been neg. TTE did not show endocarditis but it was a limited study.  Spoke with Dr. Dewitt and she will consult on the patient. continue on unasyn  I spoke with vascular surgery, her surgery will be planned for tomorrow.  Diet okay and n.p.o. tonight.    I have discussed this patient's plan of care and discharge plan at IDT rounds today with Case Management, Nursing, Nursing leadership, and other members of the IDT team.    Consultants/Specialty  vascular surgery    Code Status  Full Code    Disposition  Patient is not medically cleared for discharge.   Anticipate discharge to to home with close outpatient follow-up.  I have placed the appropriate orders for post-discharge needs.    Review of Systems  Review of Systems   Constitutional:  Positive  for malaise/fatigue.   Respiratory:  Negative for shortness of breath.    Cardiovascular:  Positive for leg swelling. Negative for chest pain.   Gastrointestinal:  Positive for diarrhea. Negative for abdominal pain, nausea and vomiting.   Genitourinary:  Negative for dysuria.   Musculoskeletal:  Positive for myalgias.   Skin:  Positive for rash.   Neurological:  Negative for weakness.      Physical Exam  Temp:  [36.6 °C (97.9 °F)-37.2 °C (99 °F)] 36.8 °C (98.2 °F)  Pulse:  [53-68] 68  Resp:  [16-18] 16  BP: (129-154)/(69-79) 133/74  SpO2:  [94 %-96 %] 96 %    Physical Exam  Vitals and nursing note reviewed.   Constitutional:       Appearance: She is not toxic-appearing or diaphoretic.   HENT:      Head: Normocephalic.      Mouth/Throat:      Mouth: Mucous membranes are moist.   Eyes:      General:         Right eye: No discharge.         Left eye: No discharge.   Cardiovascular:      Rate and Rhythm: Normal rate and regular rhythm.   Pulmonary:      Effort: Pulmonary effort is normal. No respiratory distress.      Breath sounds: No wheezing or rales.   Abdominal:      General: There is no distension.      Palpations: Abdomen is soft.      Tenderness: There is no abdominal tenderness.   Musculoskeletal:      Cervical back: Neck supple.      Right lower leg: Edema present.      Left lower leg: Edema present.   Skin:     General: Skin is warm and dry.      Comments: Right foot, first and second toe with necrosis, no drainage seen, erythema up to mid foot, tenderness  Small area of ulceration to abdomen, mild clear drainage, minimal erythema  Scaling plaques to left lower leg   Neurological:      Mental Status: She is alert and oriented to person, place, and time.       Fluids    Intake/Output Summary (Last 24 hours) at 4/24/2023 1727  Last data filed at 4/23/2023 2035  Gross per 24 hour   Intake 200 ml   Output --   Net 200 ml       Laboratory  Recent Labs     04/22/23  1639 04/23/23  0033 04/24/23  0036   WBC 6.8 6.4  5.7   RBC 3.92* 3.74* 4.01*   HEMOGLOBIN 11.2* 10.5* 11.3*   HEMATOCRIT 34.1* 31.9* 34.4*   MCV 87.0 85.3 85.8   MCH 28.6 28.1 28.2   MCHC 32.8* 32.9* 32.8*   RDW 45.1 43.5 43.0   PLATELETCT 207 207 240   MPV 9.4 9.8 9.4     Recent Labs     04/22/23  1639 04/23/23  0033 04/24/23  0036   SODIUM 140 141 144   POTASSIUM 3.9 3.8 3.8   CHLORIDE 106 109 110   CO2 24 21 22   GLUCOSE 89 85 84   BUN 11 10 9   CREATININE 0.55 0.50 0.59   CALCIUM 8.6 8.4* 8.7     Recent Labs     04/22/23  1639   APTT 27.4   INR 1.19*         Recent Labs     04/23/23  0033   TRIGLYCERIDE 64   HDL 23*   LDL 26       Imaging  EC-ECHOCARDIOGRAM COMPLETE W/ CONT   Final Result      DX-CHEST-LIMITED (1 VIEW)   Final Result      No evidence of acute cardiopulmonary process.      US-EXTREMITY ARTERY LOWER BILAT W/BENI (COMBO)    (Results Pending)   IR-EXTREMITY ANGIOGRAM-UNILATERAL RIGHT    (Results Pending)        Assessment/Plan  * Iliac artery stenosis, right (HCC)- (present on admission)  Assessment & Plan  Noted on CTA  Transferred from [facility] to [facility] for higher level of care  VTE ppx with heparin SQ  Vascular surgery consulted, n.p.o. midnight for surgery tomorrow    Chronic pain syndrome  Assessment & Plan  Continue home pain meds -- MS contin, Oxycodone, Lyrica, Zanaflex  Patient will Tylenol and ibuprofen ordered for her pain    Nonhealing surgical wound  Assessment & Plan  Wound care consulted    Chronic acquired lymphedema  Assessment & Plan  Continue home lasix    Neuropathy  Assessment & Plan  Lyrica    Insomnia  Assessment & Plan  PRN trazodone    Class 1 obesity in adult  Assessment & Plan  Diet and lifestyle modification  Body mass index is 34.59 kg/m².      Claudication of right lower extremity (HCC)  Assessment & Plan  Abnormal BENI noted on arterial duplex  ASA/statin  Vascular surgery was consulted, plan for surgery Elio, n.p.o. midnight  Continue heparin subcu      Cellulitis  Assessment & Plan  Of right foot and  abdominal wall    Gangrene (HCC)  Assessment & Plan  Right foot/toes gangre  Has underlying PAD/PVD, tobacco abuse  Foot wound culture is growing GAS, surgery and infectious disease following    Acute panniculitis  Assessment & Plan  Abdominal wall cellulitis/panniculitis, area appears to be healing on exam  Continue Unasyn  Abdominal wound cultures so far no growth  Wound care consulted    Immunocompromised state (HCC)  Assessment & Plan  Due to biologic treatment for psoriasis, hold during infection    Bacteremia  Assessment & Plan  [place] 4/20 growing GAS  Repeat blood cultures so far no growth  TTE negative for endocarditis  Poke with Dr. Dewitt and she will consult on the patient.  Continue on Unasyn for now    Psoriatic arthritis (HCC)- (present on admission)  Assessment & Plan  On biologic agent with Enbrel, hold during infection  Followed by Rheumatology outpt    Tobacco use- (present on admission)  Assessment & Plan  Received smoking cessation counseling    Essential hypertension, benign- (present on admission)  Assessment & Plan  Bradycardic, metoprolol held  She is hypertensive  Continue Lasix, increase amlodipine from 2.5 to 5 mg    CAD (coronary artery disease)- (present on admission)  Assessment & Plan  Stable, continue aspirin and statin         VTE prophylaxis: heparin ppx    I have performed a physical exam and reviewed and updated ROS and Plan today (4/24/2023). In review of yesterday's note (4/23/2023), there are no changes except as documented above.

## 2023-04-24 NOTE — CARE PLAN
Problem: Knowledge Deficit - Standard  Goal: Patient and family/care givers will demonstrate understanding of plan of care, disease process/condition, diagnostic tests and medications  Outcome: Progressing     Problem: Pain - Standard  Goal: Alleviation of pain or a reduction in pain to the patient’s comfort goal  Outcome: Progressing   The patient is Stable - Low risk of patient condition declining or worsening    Shift Goals  Clinical Goals: pain control, IV ABX, wound consult  Patient Goals: pain control, rest    Progress made toward(s) clinical / shift goals:  IV ABX administered throughout shift. PRN pain medication given for pain. Wound consulted on patient, orders in place. Vascular to still see patient. Will continue to monitor labs. No further questions.

## 2023-04-24 NOTE — PROGRESS NOTES
Report received from Henok HERNANDEZ RN, assumed care at 0700.  Pt is A0X4, and responds appropriately.  Pt declines any SOB, chest pain, new onset of numbness/ tingling.  Pt rates pain at 7/10, on a scale of 1-10, pt medicated per MAR.  Pt is voiding adequatly and without hesitancy.  Pt has + flatus, + bowel sounds, + BM on 4/23/23.  Pt ambulates with a standby assist.   Pt is tolerating a diet, pt denies any nausea/vomiting at this time.   Plan of care discussed, all questions answered. Explained importance of calling before getting OOB and pt verbalizes understanding. Explained importance of oral care. Call light is within reach, treaded slipper socks on, bed in lowest/ locked position, hourly rounding in place, all needs met at this time.

## 2023-04-24 NOTE — CONSULTS
INFECTIOUS DISEASES INPATIENT CONSULT NOTE     Date of Service: 4/24/2023    Consult Requested By: Rasheeda Pederson M.D.    Reason for Consultation: Group A strep bacteremia, Right first and second toe necrosis    History of Present Illness:   Lorene Woodard is a 52 y.o. woman with a history of psoriasis on biologic agents, hypertension, coronary artery disease, COPD and tobacco abuse admitted 4/22/2023 from [facility] secondary to right foot swelling and erythema after sustaining an injury.  Extensive review of emergency physician notes, hospital medicine notes and consultant notes performed.  In January 2023, patient states that a box fell on her right foot.  Since then she has developed poor wound healing and recently developed increasing right foot swelling and erythema.  She also noted that her right great toe and second toe are starting to turn black in color and feeling cold to the touch.  She denied any prior fevers or chills.  No antibiotics prior to admission at the outside hospital.  She was initially seen at [facility] on 4/20/2023 for gangrenous right foot with cellulitis and acute panniculitis with cellulitis of the lower abdominal wall.  She was given Unasyn and vancomycin and blood cultures were found to be positive for group A streptococcus.  In addition, she had a CTA that showed an occluded right iliac artery with collateral flow however BENI was concerning for stenosis.  Patient was transferred to [facility] for higher level of care and vascular surgery evaluation.  Presentation, patient afebrile with no leukocytosis.  Blood cultures on 4/22 have been negative to date.  C. difficile PCR also negative.  TTE without any valvular abnormalities.  She is currently on Unasyn.  She has been evaluated by vascular surgery and plan for possible right iliac stent placement today pending OR availability.  Infectious disease service consulted for antibiotic recommendations.      All  other review of systems reviewed and negative except those documented above in the HPI.     PMH:   Past Medical History:   Diagnosis Date    CAD (coronary artery disease) December 2012    NSTEMI. Coronary angiogram with 60% LAD stenosis, 40% stenosis proximal LAD, 20% stenosis mid circumflex.    Depression     Hyperlipidemia     Hypertension     Borderline    Psoriasis     Shortness of breath February 2014    Echocardiogram with normal LV size, LVEF 60-65%, no valvular abnormalities.    Smoking        PSH:  Reviewed and not pertinent to the patient's clinical presentation    FAMILY HX:  Family History   Problem Relation Age of Onset    Heart Disease Sister        SOCIAL HX:  Social History     Socioeconomic History    Marital status: Single     Spouse name: Not on file    Number of children: Not on file    Years of education: Not on file    Highest education level: Not on file   Occupational History    Not on file   Tobacco Use    Smoking status: Every Day     Packs/day: 0.00     Types: Cigarettes    Smokeless tobacco: Never   Substance and Sexual Activity    Alcohol use: Yes    Drug use: Not on file    Sexual activity: Not on file   Other Topics Concern    Not on file   Social History Narrative    Not on file     Social Determinants of Health     Financial Resource Strain: Not on file   Food Insecurity: Not on file   Transportation Needs: Not on file   Physical Activity: Not on file   Stress: Not on file   Social Connections: Not on file   Intimate Partner Violence: Not on file   Housing Stability: Not on file     Social History     Tobacco Use   Smoking Status Every Day    Packs/day: 0.00    Types: Cigarettes   Smokeless Tobacco Never     Social History     Substance and Sexual Activity   Alcohol Use Yes       Allergies/Intolerances:  No Known Allergies    History reviewed with the patient     Other Current Medications:    Current Facility-Administered Medications:     triamcinolone acetonide (KENALOG) 0.1 %  cream, , Topical, BID, Rasheeda Pederson M.D., Given at 04/24/23 0414    lidocaine 2 % jelly 1 Application., 1 Application., Topical, QDAY PRN, Rasheeda Pederson M.D.    lidocaine (XYLOCAINE) 2 % injection 20 mL, 20 mL, Topical, QDAY PRN **OR** lidocaine (XYLOCAINE) 4 % topical solution, , Topical, QDAY PRN, Rasheeda Pederson M.D.    amLODIPine (NORVASC) tablet 5 mg, 5 mg, Oral, DAILY, Rasheeda Pederson M.D., 5 mg at 04/24/23 0508    loperamide (IMODIUM) capsule 2-4 mg, 2-4 mg, Oral, 4X/DAY PRN, Rasheeda Pederson M.D.    lactated ringers infusion (BOLUS), 500 mL, Intravenous, Once PRN, Nikunj Simon M.D.    labetalol (NORMODYNE/TRANDATE) injection 10 mg, 10 mg, Intravenous, Q4HRS PRN, Nikunj Simon M.D.    ondansetron (ZOFRAN) syringe/vial injection 4 mg, 4 mg, Intravenous, Q4HRS PRN, Nikunj Simon M.D.    ondansetron (ZOFRAN ODT) dispertab 4 mg, 4 mg, Oral, Q4HRS PRN, Nikunj Simon M.D.    promethazine (PHENERGAN) tablet 12.5-25 mg, 12.5-25 mg, Oral, Q4HRS PRN, Nikunj Simon M.D.    promethazine (PHENERGAN) suppository 12.5-25 mg, 12.5-25 mg, Rectal, Q4HRS PRN, Nikunj Simon M.D.    prochlorperazine (COMPAZINE) injection 5-10 mg, 5-10 mg, Intravenous, Q4HRS PRN, Nikunj Simon M.D.    guaiFENesin dextromethorphan (ROBITUSSIN DM) 100-10 MG/5ML syrup 10 mL, 10 mL, Oral, Q6HRS PRN, Nikunj Simon M.D.    heparin injection 5,000 Units, 5,000 Units, Subcutaneous, Q8HRS, Nikunj Simon M.D., 5,000 Units at 04/24/23 0414    ampicillin/sulbactam (UNASYN) 3 g in  mL IVPB, 3 g, Intravenous, Q6HRS, Nikunj Simon M.D., Stopped at 04/24/23 0442    nicotine (NICODERM) 21 MG/24HR 21 mg, 21 mg, Transdermal, Daily-0600, 21 mg at 04/22/23 1843 **AND** Nicotine Replacement Patient Education Materials, , , Once **AND** nicotine polacrilex (NICORETTE) 2 MG piece 2 mg, 2 mg, Oral, Q HOUR PRN, Nikunj Simon M.D.    furosemide (LASIX) tablet 40 mg, 40 mg, Oral, DAILY, Nikunj Simon M.D., 40 mg at 04/24/23 0414    aspirin (ASA) chewable tab 81 mg, 81 mg, Oral,  "DAILY, Nikunj Simon M.D., 81 mg at 23 0414    atorvastatin (LIPITOR) tablet 40 mg, 40 mg, Oral, Q EVENING, Nikunj Simon M.D., 40 mg at 23 1758    hydrOXYzine HCl (ATARAX) tablet 25 mg, 25 mg, Oral, Q6HRS PRN, Nikunj Simon M.D.    morphine ER (MS CONTIN) tablet 30 mg, 30 mg, Oral, Q12HRS, Nikunj Simon M.D., 30 mg at 23 0415    pregabalin (LYRICA) capsule 300 mg, 300 mg, Oral, BID, Nikunj Simon M.D., 300 mg at 23 0415    tizanidine (ZANAFLEX) tablet 4 mg, 4 mg, Oral, Q6HRS PRN, Nikunj Simon M.D.    [Held by provider] metoprolol tartrate (LOPRESSOR) tablet 50 mg, 50 mg, Oral, TWICE DAILY, Nikunj Simon M.D.    Pharmacy Consult Request ...Pain Management Review 1 Each, 1 Each, Other, PHARMACY TO DOSE, Liss Taveras, A.P.R.N.    acetaminophen (TYLENOL) tablet 1,000 mg, 1,000 mg, Oral, Q6HR, 1,000 mg at 23 0851 **FOLLOWED BY** [START ON 2023] acetaminophen (TYLENOL) tablet 1,000 mg, 1,000 mg, Oral, Q6HRS PRN, Liss Taveras, A.P.R.N.    ibuprofen (MOTRIN) tablet 800 mg, 800 mg, Oral, Q8HR, 800 mg at 23 0414 **FOLLOWED BY** [START ON 2023] ibuprofen (MOTRIN) tablet 800 mg, 800 mg, Oral, TID PRN, Liss Taveras A.P.R.N.    oxyCODONE immediate-release (ROXICODONE) tablet 5 mg, 5 mg, Oral, Q3HRS PRN **OR** oxyCODONE immediate release (ROXICODONE) tablet 10 mg, 10 mg, Oral, Q3HRS PRN, 10 mg at 23 0851 **OR** HYDROmorphone (Dilaudid) injection 0.5 mg, 0.5 mg, Intravenous, Q3HRS PRN, CARLOS SinclairRDaltonNDalton, 0.5 mg at 23 0095  [unfilled]    Most Recent Vital Signs:  /69   Pulse (!) 53   Temp 36.6 °C (97.9 °F) (Temporal)   Resp 17   Ht 1.702 m (5' 7.01\")   Wt 100 kg (220 lb 14.4 oz)   LMP 2017   SpO2 94%   BMI 34.59 kg/m²   Temp  Av.6 °C (97.9 °F)  Min: 36.3 °C (97.3 °F)  Max: 37.2 °C (99 °F)    Physical Exam:  General: well nourished, no diaphoresis, well-appearing, no acute " distress  HEENT: sclera anicteric, PERRL, extraocular muscles intact, mucous membranes moist, oropharynx clear and moist, no oral lesions or exudate  Neck: supple, no lymphadenopathy  Chest: CTAB, no rales, rhonchi or wheezes, normal work of breathing.  Cardiac: regular rate and rhythm, normal S1 S2, no murmurs, rubs or gallops  Abdomen: + bowel sounds, soft, non-tender, non-distended, no hepatosplenomegaly  Extremities: Bilateral lower extremity psoriatic changes.  Right great toe and second toe necrosis with erythema extending proximally  Skin: warm and dry, no rashes or worrisome lesions  Neuro: Alert and oriented times 3, non-focal exam, speech fluent, full range of motion to bilateral upper and lower extremities  Psych: normal mood and behavior, pleasant; memory intact, normal judgement    Pertinent Lab Results:  Recent Labs     04/22/23  1639 04/23/23  0033 04/24/23  0036   WBC 6.8 6.4 5.7      Recent Labs     04/22/23  1639 04/23/23  0033 04/24/23  0036   HEMOGLOBIN 11.2* 10.5* 11.3*   HEMATOCRIT 34.1* 31.9* 34.4*   MCV 87.0 85.3 85.8   MCH 28.6 28.1 28.2   PLATELETCT 207 207 240         Recent Labs     04/22/23  1639 04/23/23  0033 04/24/23  0036   SODIUM 140 141 144   POTASSIUM 3.9 3.8 3.8   CHLORIDE 106 109 110   CO2 24 21 22   CREATININE 0.55 0.50 0.59        Recent Labs     04/22/23  1639 04/23/23  0033   ALBUMIN 3.1* 3.1*        Pertinent Micro:  Results       Procedure Component Value Units Date/Time    Cdiff By PCR Rflx Toxin [[id number]] Collected: 04/23/23 1430    Order Status: Completed Updated: 04/23/23 1543     C Diff by PCR Negative     Comment: C. difficile NOT detected by PCR.  Treatment not indicated per guidelines.  Repeat testing not indicated within 7 days.          027-NAP1-BI Presumptive Negative     Comment: Presumptive 027/NAP1/BI target DNA sequences are NOT DETECTED.       C Diff by PCR rflx Toxin [[id number]]     Order Status: No result Specimen: Stool     Blood Culture [[id number]]  "Collected: 04/22/23 1806    Order Status: Completed Specimen: Blood Updated: 04/23/23 0729     Significant Indicator NEG     Source BLD     Site Peripheral     Culture Result No Growth  Note: Blood cultures are incubated for 5 days and  are monitored continuously.Positive blood cultures  are called to the RN and reported as soon as  they are identified.      Narrative:      Pt is on antibiotics, results may be compromised.  04/22/2023  18:52  Left Forearm/Arm    Blood Culture [[id number]] Collected: 04/22/23 1806    Order Status: Completed Specimen: Blood Updated: 04/23/23 0729     Significant Indicator NEG     Source BLD     Site Peripheral     Culture Result No Growth  Note: Blood cultures are incubated for 5 days and  are monitored continuously.Positive blood cultures  are called to the RN and reported as soon as  they are identified.      Narrative:      Previous comment was modified by SHERIN at 19:05 on 04/22/23.  Pt is on antibiotics, results may be compromised.  04/22/2023  18:55  ?Ordered form hard req #[id number]  ?On req it also notes \" Antiobiotics administeres, was paused when  ?drawn\"  ? 04/22/2023  18:57  Pt is on antibiotics, results may be compromised.  04/22/2023  18:55  Left Forearm/Arm    GRAM STAIN [[id number]] Collected: 04/22/23 2158    Order Status: Completed Specimen: Wound Updated: 04/23/23 0515     Significant Indicator .     Source WND     Site Foot     Gram Stain Result No organisms seen.    Narrative:      Collected By: [id number] BARCELON EULA  Foot    GRAM STAIN [[id number]] Collected: 04/22/23 2158    Order Status: Completed Specimen: Wound Updated: 04/23/23 0515     Significant Indicator .     Source WND     Site ABDOMINAL     Gram Stain Result Many WBCs.  No organisms seen.      Narrative:      Collected By: [id number] BARCELON EULA  ABD (abdominal)    CULTURE WOUND W/ GRAM STAIN [[id number]] Collected: 04/22/23 2158    Order Status: Sent Specimen: Wound from Abdominal Updated: 04/23/23 " 0515     Significant Indicator NEG     Source WND     Site Foot     Culture Result -     Gram Stain Result No organisms seen.    Narrative:      Collected By: [id number] PEG FORDE  Foot    Culture Wound W/ Gram Stain [[id number]] Collected: 04/22/23 2158    Order Status: Sent Specimen: Wound from Abdominal Updated: 04/23/23 0514     Significant Indicator NEG     Source WND     Site ABDOMINAL     Culture Result -     Gram Stain Result Many WBCs.  No organisms seen.      Narrative:      Collected By: [id number] PEG FORDE  ABD (abdominal)    Urinalysis [[id number]] Collected: 04/23/23 0100    Order Status: Completed Specimen: Urine, Clean Catch Updated: 04/23/23 0136     Color Yellow     Character Clear     Specific Gravity 1.008     Ph 7.0     Glucose Negative mg/dL      Ketones Negative mg/dL      Protein Negative mg/dL      Bilirubin Negative     Urobilinogen, Urine 0.2     Nitrite Negative     Leukocyte Esterase Negative     Occult Blood Negative     Micro Urine Req see below     Comment: Microscopic examination not performed when specimen is clear  and chemically negative for protein, blood, leukocyte esterase  and nitrite.         Narrative:      Collected By: [id number] CUSHING DORAN S  If not done within the last 24 hours  Collected By: [id number] CUSHING DORAN S  Indication for culture:->Evaluation for sepsis without a  clear source of infection    Urine Culture [[id number]] Collected: 04/23/23 0100    Order Status: Sent Specimen: Urine, Clean Catch Updated: 04/23/23 0128    Narrative:      Collected By: [id number] CUSHING DORAN S  If not done within the last 24 hours  Collected By: [id number] CUSHING DORAN S  Indication for culture:->Evaluation for sepsis without a  clear source of infection    S. Aureus By PCR, Nasal Complete [[id number]] Collected: 04/22/23 2144    Order Status: Completed Specimen: Respirate from Respiratory Updated: 04/22/23 2310     Staph aureus by PCR Negative     MRSA by PCR Negative     Narrative:      Collected By: [id number] PEG FORDE    Blood Culture [[id number]]     Order Status: No result Specimen: Blood from Peripheral     Blood Culture [[id number]]     Order Status: No result Specimen: Blood from Peripheral           No results found for: BLOODCULTU, BLDCULT, BCHOLD     Studies:  DX-CHEST-LIMITED (1 VIEW)    Result Date: 2023 4:35 PM HISTORY/REASON FOR EXAM: Shortness of breath TECHNIQUE/EXAM DESCRIPTION AND NUMBER OF VIEWS: Single AP view of the chest. COMPARISON: None FINDINGS: There is no evidence of focal infiltrate or pulmonary edema. The heart is normal in size. There is no pleural effusion. Bony structures and soft tissues are unremarkable.     No evidence of acute cardiopulmonary process.    EC-ECHOCARDIOGRAM COMPLETE W/ CONT    Result Date: 2023  Transthoracic Echo Report Echocardiography Laboratory CONCLUSIONS Normal left ventricular systolic function. The left ventricular ejection fraction is visually estimated to be 55- 60%. Normal diastolic function. The right ventricle is not well visualized. Unable to estimate right ventricular systolic pressure due to an inadequate tricuspid regurgitant jet. Grossly normal left atrial size. Dilated inferior vena cava without inspiratory collapse. The cardiac valves are not well visualized in the apical views. No obvious valvular vegetation seen in the parasternal views. If there is a strong clinical concern for endocarditis and findings will change clinical management, recommend further evaluation with transesophageal echocardiogram. STANLEY MARKHAM Exam Date:         2023                    10:22 Exam Location:     Inpatient Priority:          Routine Ordering Physician:        SHAQUILLE DREW Referring Physician: Sonographer:               Felicia OKEEFE Age:    52     Gender:    F MRN:    [id number] :    1970 BSA:    2.11   Ht (in):    67     Wt (lb):    220 Exam Type:     Complete Indications:      Acute/Subacute Bacterial Endocarditis ICD Codes:       421 CPT Codes:       99765 BP:   129    /   75     HR:   60 Technical Quality:       Fair MEASUREMENTS  (Male / Female) Normal Values 2D ECHO LV Diastolic Diameter PLAX        4.4 cm                4.2 - 5.9 / 3.9 - 5.3 cm LV Systolic Diameter PLAX         3.1 cm                2.1 - 4.0 cm IVS Diastolic Thickness           0.9 cm                LVPW Diastolic Thickness          0.8 cm                LVOT Diameter                     2 cm                  Estimated LV Ejection Fraction    60 %                  LV Ejection Fraction MOD BP       62.1 %                >= 55  % LV Ejection Fraction MOD 4C       68.2 %                LV Ejection Fraction MOD 2C       56.8 %                LV Ejection Fraction 4C AL        68.9 %                LV Ejection Fraction 2C AL        58.5 %                DOPPLER AV Peak Velocity                  1.2 m/s               AV Peak Gradient                  5.7 mmHg              AV Mean Gradient                  2 mmHg                LVOT Peak Velocity                0.88 m/s              AV Area Cont Eq vti               2.9 cm2               Mitral E Point Velocity           1 m/s                 Mitral E to A Ratio               1.8                   MV Pressure Half Time             68 ms                 MV Area PHT                       3.2 cm2               MV Deceleration Time              233 ms                PV Peak Velocity                  0.94 m/s              PV Peak Gradient                  3.5 mmHg              LV E' Lateral Velocity            14.4 cm/s             Mitral E to LV E' Lateral Ratio   6.9                   LV E' Septal Velocity             12.1 cm/s             Mitral E to LV E' Septal Ratio    8.2                   * Indicates values subject to auto-interpretation LV EF:  60    % FINDINGS Left Ventricle The left ventricle is normal in size and thickness. Normal left ventricular wall thickness.  Normal left ventricular systolic function. The left ventricular ejection fraction is visually estimated to be 55- 60%. Normal regional wall motion. Normal diastolic function. Right Ventricle The right ventricle is not well visualized. Right Atrium The right atrium is not well visualized.  Dilated inferior vena cava without inspiratory collapse. Left Atrium Grossly normal left atrial size. Mitral Valve Structurally normal mitral valve in the parasternal view without significant stenosis. Trace mitral regurgitation. Aortic Valve Structurally normal aortic valve in the parasternal view without significant stenosis or regurgitation. Tricuspid Valve Structurally normal tricuspid valve in the parasternal view without significant stenosis or regurgitation. Unable to estimate right ventricular systolic pressure due to an inadequate tricuspid regurgitant jet. Pulmonic Valve The pulmonic valve is not well visualized. No significant stenosis and trace pulmonic insufficiency by Doppler evaluation. Pericardium No pericardial effusion. Aorta Normal aortic root for body surface area. The ascending aorta diameter is 2.7 cm. Trell Nicholson MD (Electronically Signed) Final Date:     23 April 2023                 15:20      IMPRESSION:   1.  Group A strep bacteremia   2.  Right great toe and second toe necrosis  3.  Right foot cellulitis  4.  Peripheral arterial disease  5.  COPD  6.  Tobacco dependence  7.  Hypertension  8.  Psoriasis on Cosentyx  9.  Immunosuppressed state    PLAN:   Lorene Woodard is a 52 y.o. woman with a history of COPD, tobacco dependence, hypertension, and peripheral arterial disease admitted on 4/22/2023 from [facility] where she presented with worsening right foot swelling and erythema.  Patient found to have right foot cellulitis with necrotic first and second toe and CTA findings revealing an occluded right iliac artery.  She was also found to have positive blood cultures on 4/20 at the  outside hospital with group A streptococcus.  Blood cultures here on 4/22 are negative to date.    -Continue IV Unasyn 3 g every 6 hours  -Plan for right iliac stent placement tomorrow  -Anticipate a 14-day course of antibiotics for treatment of bacteremia and right foot infection  -At discharge, anticipate transitioning to oral linezolid 600 mg twice daily to complete antibiotic course  -Advised tobacco cessation  -Monitor right lower extremity after procedure    Disposition: Home    Midline: No, oral antibiotic options available      Plan of care discussed with JUSTIN Pederson M.D.. Will continue to follow    Breanne Dewitt M.D.      Please note that this dictation was created using voice recognition software. I have worked with technical experts from [facility] to optimize the interface.  I have made every reasonable attempt to correct obvious errors, but there may be errors of grammar and possibly content that I did not discover before finalizing the note.

## 2023-04-24 NOTE — PROGRESS NOTES
VASCULAR SURGERY SERVICE                        Progress Note  _________________________________    Planning right iliac stent today  Time TBD based on schedule availability  [facility]   Voalte preferred or call my office [phone]  __________________________________________________  Patient:Lorene Woodard   MRN:[id number]

## 2023-04-25 LAB
ANION GAP SERPL CALC-SCNC: 11 MMOL/L (ref 7–16)
BACTERIA UR CULT: NORMAL
BACTERIA WND AEROBE CULT: ABNORMAL
BACTERIA WND AEROBE CULT: ABNORMAL
BASOPHILS # BLD AUTO: 0.5 % (ref 0–1.8)
BASOPHILS # BLD: 0.03 K/UL (ref 0–0.12)
BUN SERPL-MCNC: 8 MG/DL (ref 8–22)
CALCIUM SERPL-MCNC: 8.4 MG/DL (ref 8.5–10.5)
CHLORIDE SERPL-SCNC: 106 MMOL/L (ref 96–112)
CO2 SERPL-SCNC: 25 MMOL/L (ref 20–33)
CREAT SERPL-MCNC: 0.87 MG/DL (ref 0.5–1.4)
EOSINOPHIL # BLD AUTO: 0.31 K/UL (ref 0–0.51)
EOSINOPHIL NFR BLD: 5 % (ref 0–6.9)
ERYTHROCYTE [DISTWIDTH] IN BLOOD BY AUTOMATED COUNT: 43.2 FL (ref 35.9–50)
GFR SERPLBLD CREATININE-BSD FMLA CKD-EPI: 80 ML/MIN/1.73 M 2
GLUCOSE SERPL-MCNC: 93 MG/DL (ref 65–99)
GRAM STN SPEC: ABNORMAL
HCT VFR BLD AUTO: 33.6 % (ref 37–47)
HGB BLD-MCNC: 11.1 G/DL (ref 12–16)
IMM GRANULOCYTES # BLD AUTO: 0.02 K/UL (ref 0–0.11)
IMM GRANULOCYTES NFR BLD AUTO: 0.3 % (ref 0–0.9)
LYMPHOCYTES # BLD AUTO: 1.66 K/UL (ref 1–4.8)
LYMPHOCYTES NFR BLD: 26.8 % (ref 22–41)
MCH RBC QN AUTO: 27.9 PG (ref 27–33)
MCHC RBC AUTO-ENTMCNC: 33 G/DL (ref 33.6–35)
MCV RBC AUTO: 84.4 FL (ref 81.4–97.8)
MONOCYTES # BLD AUTO: 0.46 K/UL (ref 0–0.85)
MONOCYTES NFR BLD AUTO: 7.4 % (ref 0–13.4)
NEUTROPHILS # BLD AUTO: 3.71 K/UL (ref 2–7.15)
NEUTROPHILS NFR BLD: 60 % (ref 44–72)
NRBC # BLD AUTO: 0 K/UL
NRBC BLD-RTO: 0 /100 WBC
PLATELET # BLD AUTO: 248 K/UL (ref 164–446)
PMV BLD AUTO: 9.1 FL (ref 9–12.9)
POTASSIUM SERPL-SCNC: 3.3 MMOL/L (ref 3.6–5.5)
RBC # BLD AUTO: 3.98 M/UL (ref 4.2–5.4)
SIGNIFICANT IND 70042: ABNORMAL
SIGNIFICANT IND 70042: NORMAL
SITE SITE: ABNORMAL
SITE SITE: NORMAL
SODIUM SERPL-SCNC: 142 MMOL/L (ref 135–145)
SOURCE SOURCE: ABNORMAL
SOURCE SOURCE: NORMAL
WBC # BLD AUTO: 6.2 K/UL (ref 4.8–10.8)

## 2023-04-25 PROCEDURE — A9270 NON-COVERED ITEM OR SERVICE: HCPCS | Performed by: STUDENT IN AN ORGANIZED HEALTH CARE EDUCATION/TRAINING PROGRAM

## 2023-04-25 PROCEDURE — 36415 COLL VENOUS BLD VENIPUNCTURE: CPT

## 2023-04-25 PROCEDURE — 99233 SBSQ HOSP IP/OBS HIGH 50: CPT | Performed by: INTERNAL MEDICINE

## 2023-04-25 PROCEDURE — 770001 HCHG ROOM/CARE - MED/SURG/GYN PRIV*

## 2023-04-25 PROCEDURE — 700111 HCHG RX REV CODE 636 W/ 250 OVERRIDE (IP): Performed by: STUDENT IN AN ORGANIZED HEALTH CARE EDUCATION/TRAINING PROGRAM

## 2023-04-25 PROCEDURE — 700102 HCHG RX REV CODE 250 W/ 637 OVERRIDE(OP): Performed by: NURSE PRACTITIONER

## 2023-04-25 PROCEDURE — 700111 HCHG RX REV CODE 636 W/ 250 OVERRIDE (IP): Performed by: INTERNAL MEDICINE

## 2023-04-25 PROCEDURE — 85025 COMPLETE CBC W/AUTO DIFF WBC: CPT

## 2023-04-25 PROCEDURE — 700102 HCHG RX REV CODE 250 W/ 637 OVERRIDE(OP): Performed by: STUDENT IN AN ORGANIZED HEALTH CARE EDUCATION/TRAINING PROGRAM

## 2023-04-25 PROCEDURE — 700102 HCHG RX REV CODE 250 W/ 637 OVERRIDE(OP): Performed by: INTERNAL MEDICINE

## 2023-04-25 PROCEDURE — A9270 NON-COVERED ITEM OR SERVICE: HCPCS | Performed by: NURSE PRACTITIONER

## 2023-04-25 PROCEDURE — 99233 SBSQ HOSP IP/OBS HIGH 50: CPT | Performed by: STUDENT IN AN ORGANIZED HEALTH CARE EDUCATION/TRAINING PROGRAM

## 2023-04-25 PROCEDURE — 80048 BASIC METABOLIC PNL TOTAL CA: CPT

## 2023-04-25 PROCEDURE — 700105 HCHG RX REV CODE 258: Performed by: INTERNAL MEDICINE

## 2023-04-25 PROCEDURE — A9270 NON-COVERED ITEM OR SERVICE: HCPCS | Performed by: INTERNAL MEDICINE

## 2023-04-25 RX ADMIN — IBUPROFEN 800 MG: 800 TABLET, FILM COATED ORAL at 11:55

## 2023-04-25 RX ADMIN — HYDROMORPHONE HYDROCHLORIDE 1 MG: 1 INJECTION, SOLUTION INTRAMUSCULAR; INTRAVENOUS; SUBCUTANEOUS at 13:02

## 2023-04-25 RX ADMIN — ACETAMINOPHEN 1000 MG: 500 TABLET, FILM COATED ORAL at 03:25

## 2023-04-25 RX ADMIN — AMPICILLIN AND SULBACTAM 3 G: 1; 2 INJECTION, POWDER, FOR SOLUTION INTRAMUSCULAR; INTRAVENOUS at 00:01

## 2023-04-25 RX ADMIN — IBUPROFEN 800 MG: 800 TABLET, FILM COATED ORAL at 19:52

## 2023-04-25 RX ADMIN — ACETAMINOPHEN 1000 MG: 500 TABLET, FILM COATED ORAL at 14:57

## 2023-04-25 RX ADMIN — OXYCODONE HYDROCHLORIDE 10 MG: 10 TABLET ORAL at 08:08

## 2023-04-25 RX ADMIN — OXYCODONE HYDROCHLORIDE 10 MG: 10 TABLET ORAL at 14:57

## 2023-04-25 RX ADMIN — OXYCODONE HYDROCHLORIDE 10 MG: 10 TABLET ORAL at 00:02

## 2023-04-25 RX ADMIN — TRIAMCINOLONE ACETONIDE: 1 CREAM TOPICAL at 08:09

## 2023-04-25 RX ADMIN — FUROSEMIDE 40 MG: 20 TABLET ORAL at 11:55

## 2023-04-25 RX ADMIN — OXYCODONE HYDROCHLORIDE 10 MG: 10 TABLET ORAL at 03:25

## 2023-04-25 RX ADMIN — AMPICILLIN AND SULBACTAM 3 G: 1; 2 INJECTION, POWDER, FOR SOLUTION INTRAMUSCULAR; INTRAVENOUS at 17:41

## 2023-04-25 RX ADMIN — ACETAMINOPHEN 1000 MG: 500 TABLET, FILM COATED ORAL at 19:52

## 2023-04-25 RX ADMIN — MORPHINE SULFATE 30 MG: 30 TABLET, FILM COATED, EXTENDED RELEASE ORAL at 05:20

## 2023-04-25 RX ADMIN — OXYCODONE HYDROCHLORIDE 10 MG: 10 TABLET ORAL at 21:39

## 2023-04-25 RX ADMIN — LOPERAMIDE HYDROCHLORIDE 4 MG: 2 CAPSULE ORAL at 21:36

## 2023-04-25 RX ADMIN — AMLODIPINE BESYLATE 5 MG: 10 TABLET ORAL at 05:19

## 2023-04-25 RX ADMIN — OXYCODONE HYDROCHLORIDE 10 MG: 10 TABLET ORAL at 17:51

## 2023-04-25 RX ADMIN — ATORVASTATIN CALCIUM 40 MG: 40 TABLET, FILM COATED ORAL at 17:45

## 2023-04-25 RX ADMIN — HEPARIN SODIUM 5000 UNITS: 5000 INJECTION, SOLUTION INTRAVENOUS; SUBCUTANEOUS at 14:58

## 2023-04-25 RX ADMIN — AMPICILLIN AND SULBACTAM 3 G: 1; 2 INJECTION, POWDER, FOR SOLUTION INTRAMUSCULAR; INTRAVENOUS at 05:23

## 2023-04-25 RX ADMIN — HYDROMORPHONE HYDROCHLORIDE 1 MG: 1 INJECTION, SOLUTION INTRAMUSCULAR; INTRAVENOUS; SUBCUTANEOUS at 05:07

## 2023-04-25 RX ADMIN — NICOTINE TRANSDERMAL SYSTEM 21 MG: 21 PATCH, EXTENDED RELEASE TRANSDERMAL at 05:22

## 2023-04-25 RX ADMIN — HYDROMORPHONE HYDROCHLORIDE 1 MG: 1 INJECTION, SOLUTION INTRAMUSCULAR; INTRAVENOUS; SUBCUTANEOUS at 22:52

## 2023-04-25 RX ADMIN — TRIAMCINOLONE ACETONIDE: 1 CREAM TOPICAL at 17:46

## 2023-04-25 RX ADMIN — LOPERAMIDE HYDROCHLORIDE 2 MG: 2 CAPSULE ORAL at 14:57

## 2023-04-25 RX ADMIN — OXYCODONE HYDROCHLORIDE 10 MG: 10 TABLET ORAL at 11:55

## 2023-04-25 RX ADMIN — PREGABALIN 300 MG: 150 CAPSULE ORAL at 05:20

## 2023-04-25 RX ADMIN — HEPARIN SODIUM 5000 UNITS: 5000 INJECTION, SOLUTION INTRAVENOUS; SUBCUTANEOUS at 21:36

## 2023-04-25 RX ADMIN — HYDROMORPHONE HYDROCHLORIDE 1 MG: 1 INJECTION, SOLUTION INTRAMUSCULAR; INTRAVENOUS; SUBCUTANEOUS at 19:52

## 2023-04-25 RX ADMIN — HYDROMORPHONE HYDROCHLORIDE 1 MG: 1 INJECTION, SOLUTION INTRAMUSCULAR; INTRAVENOUS; SUBCUTANEOUS at 15:51

## 2023-04-25 RX ADMIN — ACETAMINOPHEN 1000 MG: 500 TABLET, FILM COATED ORAL at 08:07

## 2023-04-25 RX ADMIN — AMPICILLIN AND SULBACTAM 3 G: 1; 2 INJECTION, POWDER, FOR SOLUTION INTRAMUSCULAR; INTRAVENOUS at 11:57

## 2023-04-25 RX ADMIN — PREGABALIN 300 MG: 150 CAPSULE ORAL at 17:45

## 2023-04-25 RX ADMIN — MORPHINE SULFATE 30 MG: 30 TABLET, FILM COATED, EXTENDED RELEASE ORAL at 17:45

## 2023-04-25 ASSESSMENT — PAIN DESCRIPTION - PAIN TYPE
TYPE: ACUTE PAIN

## 2023-04-25 ASSESSMENT — ENCOUNTER SYMPTOMS
VOMITING: 0
FEVER: 0
COUGH: 0
CHILLS: 0
DIARRHEA: 1
SHORTNESS OF BREATH: 0
NAUSEA: 0
ABDOMINAL PAIN: 0
MYALGIAS: 1
WEAKNESS: 0

## 2023-04-25 NOTE — CARE PLAN
Problem: Pain - Standard  Goal: Alleviation of pain or a reduction in pain to the patient’s comfort goal  Description: Target End Date:  Prior to discharge or change in level of care    Document on Vitals flowsheet    1.  Document pain using the appropriate pain scale per order or unit policy  2.  Educate and implement non-pharmacologic comfort measures (i.e. relaxation, distraction, massage, cold/heat therapy, etc.)  3.  Pain management medications as ordered  4.  Reassess pain after pain med administration per policy  5.  If opiods administered assess patient's response to pain medication is appropriate per POSS sedation scale  6.  Follow pain management plan developed in collaboration with patient and interdisciplinary team (including palliative care or pain specialists if applicable)  Outcome: Progressing     Problem: Knowledge Deficit - Standard  Goal: Patient and family/care givers will demonstrate understanding of plan of care, disease process/condition, diagnostic tests and medications  Description: Target End Date:  1-3 days or as soon as patient condition allows    Document in Patient Education    1.  Patient and family/caregiver oriented to unit, equipment, visitation policy and means for communicating concern  2.  Complete/review Learning Assessment  3.  Assess knowledge level of disease process/condition, treatment plan, diagnostic tests and medications  4.  Explain disease process/condition, treatment plan, diagnostic tests and medications  Outcome: Progressing     The patient is Stable - Low risk of patient condition declining or worsening    Shift Goals  Clinical Goals: Angiogram, vascular stent  Patient Goals: pain control    Progress made toward(s) clinical / shift goals:  IR ordered for angiogram with Vascular sx. Pain managed with PRN medications    Patient is not progressing towards the following goals:

## 2023-04-25 NOTE — CARE PLAN
Problem: Knowledge Deficit - Standard  Goal: Patient and family/care givers will demonstrate understanding of plan of care, disease process/condition, diagnostic tests and medications  Outcome: Progressing     Problem: Pain - Standard  Goal: Alleviation of pain or a reduction in pain to the patient’s comfort goal  Outcome: Progressing   The patient is Stable - Low risk of patient condition declining or worsening    Shift Goals  Clinical Goals: Angiogram, vascular stent placement, pain control, NPO sips w/ meds  Patient Goals: pain control, rest    Progress made toward(s) clinical / shift goals:  Pt pain managed with PRN pain medication. Pt ambulating to the restroom. Per Dr. Wagner, surgery postponed until tomorrow 4/26 d/t scheduling conflicts. Resume cardiac diet at this time, NPO sips w/ meds at midnight.    Patient is not progressing towards the following goals:

## 2023-04-25 NOTE — PROGRESS NOTES
Report received from Henok HERNANDEZ RN, assumed care at 0700.  Pt declines any SOB, chest pain, new onset of numbness/ tingling.  Pt rates pain at 7/10, on a scale of 1-10, pt medicated per MAR.  Pt is voiding adequatly and without hesitancy.  Pt has + flatus, + bowel sounds, + BM on 4/24.  Pt ambulates independently.  Pt is NPO sips w/ meds at this time, pt denies any nausea/vomiting.  Awaiting vascular surgery.  Plan of care discussed, all questions answered. Explained importance of oral care. Call light is within reach, treaded slipper socks on, bed in lowest/ locked position, hourly rounding in place, all needs met at this time.

## 2023-04-25 NOTE — CARE PLAN
Problem: Knowledge Deficit - Standard  Goal: Patient and family/care givers will demonstrate understanding of plan of care, disease process/condition, diagnostic tests and medications  Outcome: Progressing     Problem: Pain - Standard  Goal: Alleviation of pain or a reduction in pain to the patient’s comfort goal  Outcome: Progressing   The patient is Stable - Low risk of patient condition declining or worsening    Shift Goals  Clinical Goals: pending vascular surgery, IV ABX, pain control  Patient Goals: pain control, rest    Progress made toward(s) clinical / shift goals:  Patient medicated with PRN pain medication. Discussed with pt for planned surgery 4/25. Pt ambulated to shower with daughter. All questions/concerns answered.

## 2023-04-25 NOTE — PROGRESS NOTES
VASCULAR SURGERY SERVICE                        Progress Note  _________________________________    No acute changes  Cellulitis appears to be improving    We were planning right iliac stent today however has to reschedule due to scheduling constraints  OK to eat now but NPO except meds after midnight again      Marlon Kaplan MD  [facility]   Voalte preferred or call my office [phone]  __________________________________________________  Patient:Lorene Woodard   MRN:[id number]

## 2023-04-25 NOTE — PROGRESS NOTES
Infectious Disease Progress Note    Author: Breanne Dewitt M.D. Date & Time of service: 2023  8:50 AM    Chief Complaint:  Follow-up for group A strep bacteremia, right toe necrosis and cellulitis    Interval History:   patient afebrile, WBC 6.2, creatinine 0.87.  Foot wound culture also growing group A streptococcus.  blood cultures negative to date.  Patient doing well without any new complaints.      Labs Reviewed, Medications Reviewed, and Wound Reviewed.    Review of Systems:  Review of Systems   Constitutional:  Negative for chills and fever.   Respiratory:  Negative for cough and shortness of breath.    Gastrointestinal:  Positive for diarrhea. Negative for abdominal pain, nausea and vomiting.     Hemodynamics:  Temp (24hrs), Av.8 °C (98.3 °F), Min:36.6 °C (97.9 °F), Max:37.1 °C (98.8 °F)  Temperature: 36.6 °C (97.9 °F)  Pulse  Av.5  Min: 49  Max: 68   Blood Pressure: (!) 165/91 (RN notified)       Physical Exam:  Physical Exam  Vitals and nursing note reviewed.   Constitutional:       Appearance: Normal appearance. She is obese.   HENT:      Mouth/Throat:      Mouth: Mucous membranes are moist.   Eyes:      Extraocular Movements: Extraocular movements intact.      Pupils: Pupils are equal, round, and reactive to light.   Cardiovascular:      Rate and Rhythm: Normal rate.   Pulmonary:      Effort: Pulmonary effort is normal. No respiratory distress.      Breath sounds: No wheezing.   Abdominal:      Palpations: Abdomen is soft.      Tenderness: There is no abdominal tenderness.      Comments: Lower abdominal superficial wound-no active drainage or surrounding erythema   Musculoskeletal:      Comments: Right first and second toe necrosis with surrounding edema and erythema extending proximally to the dorsum of the foot    Bilateral lower extremity psoriatic changes-no signs of active superimposed infection   Skin:     General: Skin is warm.   Neurological:      General: No focal deficit  present.      Mental Status: She is alert and oriented to person, place, and time.   Psychiatric:         Mood and Affect: Mood normal.         Behavior: Behavior normal.       Meds:    Current Facility-Administered Medications:     oxyCODONE immediate-release **OR** oxyCODONE immediate-release **OR** HYDROmorphone    nicotine **AND** Nicotine Replacement Patient Education Materials **AND** nicotine polacrilex    triamcinolone acetonide    lidocaine    lidocaine **OR** lidocaine    amLODIPine    loperamide    LR    labetalol    ondansetron    ondansetron    promethazine    promethazine    prochlorperazine    guaiFENesin dextromethorphan    heparin    ampicillin-sulbactam (UNASYN) IV    furosemide    aspirin    atorvastatin    hydrOXYzine HCl    morphine ER    pregabalin    tizanidine    [Held by provider] metoprolol tartrate    Pharmacy Consult Request    acetaminophen **FOLLOWED BY** [START ON 4/27/2023] acetaminophen    ibuprofen **FOLLOWED BY** [START ON 4/27/2023] ibuprofen    Labs:  Recent Labs     04/23/23  0033 04/24/23  0036 04/25/23  0310   WBC 6.4 5.7 6.2   RBC 3.74* 4.01* 3.98*   HEMOGLOBIN 10.5* 11.3* 11.1*   HEMATOCRIT 31.9* 34.4* 33.6*   MCV 85.3 85.8 84.4   MCH 28.1 28.2 27.9   RDW 43.5 43.0 43.2   PLATELETCT 207 240 248   MPV 9.8 9.4 9.1   NEUTSPOLYS 64.30 57.00 60.00   LYMPHOCYTES 23.10 29.80 26.80   MONOCYTES 9.30 8.40 7.40   EOSINOPHILS 2.70 3.90 5.00   BASOPHILS 0.30 0.50 0.50     Recent Labs     04/23/23  0033 04/24/23  0036 04/25/23  0310   SODIUM 141 144 142   POTASSIUM 3.8 3.8 3.3*   CHLORIDE 109 110 106   CO2 21 22 25   GLUCOSE 85 84 93   BUN 10 9 8     Recent Labs     04/22/23  1639 04/23/23  0033 04/24/23  0036 04/25/23  0310   ALBUMIN 3.1* 3.1*  --   --    TBILIRUBIN 0.4 0.4  --   --    ALKPHOSPHAT 131* 121*  --   --    TOTPROTEIN 6.8 6.5  --   --    ALTSGPT 31 27  --   --    ASTSGOT 25 20  --   --    CREATININE 0.55 0.50 0.59 0.87       Imaging:  DX-CHEST-LIMITED (1 VIEW)    Result Date:  2023 4:35 PM HISTORY/REASON FOR EXAM: Shortness of breath TECHNIQUE/EXAM DESCRIPTION AND NUMBER OF VIEWS: Single AP view of the chest. COMPARISON: None FINDINGS: There is no evidence of focal infiltrate or pulmonary edema. The heart is normal in size. There is no pleural effusion. Bony structures and soft tissues are unremarkable.     No evidence of acute cardiopulmonary process.    EC-ECHOCARDIOGRAM COMPLETE W/ CONT    Result Date: 2023  Transthoracic Echo Report Echocardiography Laboratory CONCLUSIONS Normal left ventricular systolic function. The left ventricular ejection fraction is visually estimated to be 55- 60%. Normal diastolic function. The right ventricle is not well visualized. Unable to estimate right ventricular systolic pressure due to an inadequate tricuspid regurgitant jet. Grossly normal left atrial size. Dilated inferior vena cava without inspiratory collapse. The cardiac valves are not well visualized in the apical views. No obvious valvular vegetation seen in the parasternal views. If there is a strong clinical concern for endocarditis and findings will change clinical management, recommend further evaluation with transesophageal echocardiogram. STANLEY MARKHAM Exam Date:         2023                    10:22 Exam Location:     Inpatient Priority:          Routine Ordering Physician:        SHAQUILLE DREW Referring Physician: Sonographer:               Felicia OKEEFE Age:    52     Gender:    F MRN:    [id number] :    1970 BSA:    2.11   Ht (in):    67     Wt (lb):    220 Exam Type:     Complete Indications:     Acute/Subacute Bacterial Endocarditis ICD Codes:       421 CPT Codes:       93567 BP:   129    /   75     HR:   60 Technical Quality:       Fair MEASUREMENTS  (Male / Female) Normal Values 2D ECHO LV Diastolic Diameter PLAX        4.4 cm                4.2 - 5.9 / 3.9 - 5.3 cm LV Systolic Diameter PLAX         3.1 cm                2.1 - 4.0 cm IVS  Diastolic Thickness           0.9 cm                LVPW Diastolic Thickness          0.8 cm                LVOT Diameter                     2 cm                  Estimated LV Ejection Fraction    60 %                  LV Ejection Fraction MOD BP       62.1 %                >= 55  % LV Ejection Fraction MOD 4C       68.2 %                LV Ejection Fraction MOD 2C       56.8 %                LV Ejection Fraction 4C AL        68.9 %                LV Ejection Fraction 2C AL        58.5 %                DOPPLER AV Peak Velocity                  1.2 m/s               AV Peak Gradient                  5.7 mmHg              AV Mean Gradient                  2 mmHg                LVOT Peak Velocity                0.88 m/s              AV Area Cont Eq vti               2.9 cm2               Mitral E Point Velocity           1 m/s                 Mitral E to A Ratio               1.8                   MV Pressure Half Time             68 ms                 MV Area PHT                       3.2 cm2               MV Deceleration Time              233 ms                PV Peak Velocity                  0.94 m/s              PV Peak Gradient                  3.5 mmHg              LV E' Lateral Velocity            14.4 cm/s             Mitral E to LV E' Lateral Ratio   6.9                   LV E' Septal Velocity             12.1 cm/s             Mitral E to LV E' Septal Ratio    8.2                   * Indicates values subject to auto-interpretation LV EF:  60    % FINDINGS Left Ventricle The left ventricle is normal in size and thickness. Normal left ventricular wall thickness. Normal left ventricular systolic function. The left ventricular ejection fraction is visually estimated to be 55- 60%. Normal regional wall motion. Normal diastolic function. Right Ventricle The right ventricle is not well visualized. Right Atrium The right atrium is not well visualized.  Dilated inferior vena cava without inspiratory collapse. Left  Atrium Grossly normal left atrial size. Mitral Valve Structurally normal mitral valve in the parasternal view without significant stenosis. Trace mitral regurgitation. Aortic Valve Structurally normal aortic valve in the parasternal view without significant stenosis or regurgitation. Tricuspid Valve Structurally normal tricuspid valve in the parasternal view without significant stenosis or regurgitation. Unable to estimate right ventricular systolic pressure due to an inadequate tricuspid regurgitant jet. Pulmonic Valve The pulmonic valve is not well visualized. No significant stenosis and trace pulmonic insufficiency by Doppler evaluation. Pericardium No pericardial effusion. Aorta Normal aortic root for body surface area. The ascending aorta diameter is 2.7 cm. Trell Nicholson MD (Electronically Signed) Final Date:     23 April 2023                 15:20      Micro:  Results       Procedure Component Value Units Date/Time    Urine Culture [[id number]] Collected: 04/23/23 0100    Order Status: Completed Specimen: Urine, Clean Catch Updated: 04/25/23 0707     Significant Indicator NEG     Source UR     Site URINE, CLEAN CATCH     Culture Result Usual urogenital ct ,000 cfu/mL    Narrative:      Collected By: [id number] CUSHING DORAN S  If not done within the last 24 hours  Collected By: [id number] CUSHING DORAN S  Indication for culture:->Evaluation for sepsis without a  clear source of infection  Collected By: [id number] CUSHING DORAN S    Culture Wound W/ Gram Stain [[id number]] Collected: 04/22/23 2158    Order Status: Completed Specimen: Wound from Abdominal Updated: 04/24/23 1607     Significant Indicator NEG     Source WND     Site ABDOMINAL     Culture Result No growth at 24 hours.     Gram Stain Result Many WBCs.  No organisms seen.      Narrative:      Collected By: [id number] BARCELON EULA  ABD (abdominal)    CULTURE WOUND W/ GRAM STAIN [[id number]]  (Abnormal) Collected: 04/22/23 2158    Order Status:  "Completed Specimen: Wound from Abdominal Updated: 04/24/23 1153     Significant Indicator POS     Source WND     Site Foot     Culture Result -     Gram Stain Result No organisms seen.     Culture Result Streptococcus pyogenes (Group A)  Light growth      Narrative:      Collected By: [id number] PEG FORDE  Foot    Cdiff By PCR Rflx Toxin [[id number]] Collected: 04/23/23 1430    Order Status: Completed Updated: 04/23/23 1543     C Diff by PCR Negative     Comment: C. difficile NOT detected by PCR.  Treatment not indicated per guidelines.  Repeat testing not indicated within 7 days.          027-NAP1-BI Presumptive Negative     Comment: Presumptive 027/NAP1/BI target DNA sequences are NOT DETECTED.       C Diff by PCR rflx Toxin [[id number]]     Order Status: Canceled Specimen: Stool     Blood Culture [[id number]] Collected: 04/22/23 1806    Order Status: Completed Specimen: Blood Updated: 04/23/23 0729     Significant Indicator NEG     Source BLD     Site Peripheral     Culture Result No Growth  Note: Blood cultures are incubated for 5 days and  are monitored continuously.Positive blood cultures  are called to the RN and reported as soon as  they are identified.      Narrative:      Pt is on antibiotics, results may be compromised.  04/22/2023  18:52  Left Forearm/Arm    Blood Culture [[id number]] Collected: 04/22/23 1806    Order Status: Completed Specimen: Blood Updated: 04/23/23 0729     Significant Indicator NEG     Source BLD     Site Peripheral     Culture Result No Growth  Note: Blood cultures are incubated for 5 days and  are monitored continuously.Positive blood cultures  are called to the RN and reported as soon as  they are identified.      Narrative:      Previous comment was modified by SHERIN at 19:05 on 04/22/23.  Pt is on antibiotics, results may be compromised.  04/22/2023  18:55  ?Ordered form hard req #[id number]  ?On req it also notes \" Antiobiotics administeres, was paused when  ?drawn\"  ? " 04/22/2023  18:57  Pt is on antibiotics, results may be compromised.  04/22/2023  18:55  Left Forearm/Arm    GRAM STAIN [[id number]] Collected: 04/22/23 2158    Order Status: Completed Specimen: Wound Updated: 04/23/23 0515     Significant Indicator .     Source WND     Site Foot     Gram Stain Result No organisms seen.    Narrative:      Collected By: [id number] PEG FORDE  Foot    GRAM STAIN [[id number]] Collected: 04/22/23 2158    Order Status: Completed Specimen: Wound Updated: 04/23/23 0515     Significant Indicator .     Source WND     Site ABDOMINAL     Gram Stain Result Many WBCs.  No organisms seen.      Narrative:      Collected By: [id number] PEG FORDE  ABD (abdominal)    Urinalysis [[id number]] Collected: 04/23/23 0100    Order Status: Completed Specimen: Urine, Clean Catch Updated: 04/23/23 0136     Color Yellow     Character Clear     Specific Gravity 1.008     Ph 7.0     Glucose Negative mg/dL      Ketones Negative mg/dL      Protein Negative mg/dL      Bilirubin Negative     Urobilinogen, Urine 0.2     Nitrite Negative     Leukocyte Esterase Negative     Occult Blood Negative     Micro Urine Req see below     Comment: Microscopic examination not performed when specimen is clear  and chemically negative for protein, blood, leukocyte esterase  and nitrite.         Narrative:      Collected By: [id number] CUSHING DORAN S  If not done within the last 24 hours  Collected By: [id number] CUSHING MILTON S  Indication for culture:->Evaluation for sepsis without a  clear source of infection    S. Aureus By PCR, Nasal Complete [[id number]] Collected: 04/22/23 2144    Order Status: Completed Specimen: Respirate from Respiratory Updated: 04/22/23 2310     Staph aureus by PCR Negative     MRSA by PCR Negative    Narrative:      Collected By: [id number] PEG FORDE    Blood Culture [[id number]]     Order Status: No result Specimen: Blood from Peripheral     Blood Culture [[id number]]     Order Status: No result  Specimen: Blood from Peripheral             Assessment:  52 y.o. woman with a history of COPD, tobacco dependence, hypertension, and peripheral arterial disease admitted on 4/22/2023 from [facility] where she presented with worsening right foot swelling and erythema.  Patient found to have right foot cellulitis with necrotic first and second toe and CTA findings revealing an occluded right iliac artery.  She was also found to have positive blood cultures on 4/20 at the outside hospital with group A streptococcus.  Blood cultures here on 4/22 are negative to date.    Pertinent diagnoses:  1.  Group A strep bacteremia   2.  Right great toe and second toe necrosis  3.  Right foot cellulitis  4.  Peripheral arterial disease  5.  COPD  6.  Tobacco dependence  7.  Hypertension  8.  Psoriasis on Cosentyx  9.  Immunosuppressed state  10.  Abdominal wound  11.  Diarrhea, C. difficile PCR negative    Plan:  -Continue IV Unasyn 3 g every 6 hours  -Plan for right iliac stent placement possibly today  -Anticipate a 14-day course of antibiotics for treatment of bacteremia and right foot infection  -At discharge, anticipate transitioning to oral linezolid 600 mg twice daily to complete antibiotic course.  Please ensure patient can obtain linezolid prior to discharge as this may require prior authorization  -Advised tobacco cessation  -Monitor right lower extremity after procedure     Disposition: Home     Midline: No, oral antibiotic options available     Discussed with internal medicine/Dr. Miles

## 2023-04-26 ENCOUNTER — APPOINTMENT (OUTPATIENT)
Dept: RADIOLOGY | Facility: MEDICAL CENTER | Age: 53
DRG: 253 | End: 2023-04-26
Attending: SURGERY
Payer: COMMERCIAL

## 2023-04-26 LAB
ANION GAP SERPL CALC-SCNC: 13 MMOL/L (ref 7–16)
BACTERIA WND AEROBE CULT: NORMAL
BASOPHILS # BLD AUTO: 0.4 % (ref 0–1.8)
BASOPHILS # BLD: 0.03 K/UL (ref 0–0.12)
BUN SERPL-MCNC: 7 MG/DL (ref 8–22)
CALCIUM SERPL-MCNC: 8.6 MG/DL (ref 8.5–10.5)
CHLORIDE SERPL-SCNC: 103 MMOL/L (ref 96–112)
CO2 SERPL-SCNC: 24 MMOL/L (ref 20–33)
CREAT SERPL-MCNC: 0.72 MG/DL (ref 0.5–1.4)
EOSINOPHIL # BLD AUTO: 0.37 K/UL (ref 0–0.51)
EOSINOPHIL NFR BLD: 5 % (ref 0–6.9)
ERYTHROCYTE [DISTWIDTH] IN BLOOD BY AUTOMATED COUNT: 43 FL (ref 35.9–50)
GFR SERPLBLD CREATININE-BSD FMLA CKD-EPI: 101 ML/MIN/1.73 M 2
GLUCOSE SERPL-MCNC: 110 MG/DL (ref 65–99)
GRAM STN SPEC: NORMAL
HCT VFR BLD AUTO: 35.1 % (ref 37–47)
HGB BLD-MCNC: 11.4 G/DL (ref 12–16)
IMM GRANULOCYTES # BLD AUTO: 0.04 K/UL (ref 0–0.11)
IMM GRANULOCYTES NFR BLD AUTO: 0.5 % (ref 0–0.9)
LYMPHOCYTES # BLD AUTO: 1.7 K/UL (ref 1–4.8)
LYMPHOCYTES NFR BLD: 22.9 % (ref 22–41)
MCH RBC QN AUTO: 27.9 PG (ref 27–33)
MCHC RBC AUTO-ENTMCNC: 32.5 G/DL (ref 33.6–35)
MCV RBC AUTO: 85.8 FL (ref 81.4–97.8)
MONOCYTES # BLD AUTO: 0.54 K/UL (ref 0–0.85)
MONOCYTES NFR BLD AUTO: 7.3 % (ref 0–13.4)
NEUTROPHILS # BLD AUTO: 4.73 K/UL (ref 2–7.15)
NEUTROPHILS NFR BLD: 63.9 % (ref 44–72)
NRBC # BLD AUTO: 0 K/UL
NRBC BLD-RTO: 0 /100 WBC
PLATELET # BLD AUTO: 276 K/UL (ref 164–446)
PMV BLD AUTO: 9.2 FL (ref 9–12.9)
POTASSIUM SERPL-SCNC: 3.3 MMOL/L (ref 3.6–5.5)
RBC # BLD AUTO: 4.09 M/UL (ref 4.2–5.4)
SIGNIFICANT IND 70042: NORMAL
SITE SITE: NORMAL
SODIUM SERPL-SCNC: 140 MMOL/L (ref 135–145)
SOURCE SOURCE: NORMAL
WBC # BLD AUTO: 7.4 K/UL (ref 4.8–10.8)

## 2023-04-26 PROCEDURE — 36415 COLL VENOUS BLD VENIPUNCTURE: CPT

## 2023-04-26 PROCEDURE — A9270 NON-COVERED ITEM OR SERVICE: HCPCS | Performed by: STUDENT IN AN ORGANIZED HEALTH CARE EDUCATION/TRAINING PROGRAM

## 2023-04-26 PROCEDURE — 99233 SBSQ HOSP IP/OBS HIGH 50: CPT | Performed by: INTERNAL MEDICINE

## 2023-04-26 PROCEDURE — A9270 NON-COVERED ITEM OR SERVICE: HCPCS | Performed by: INTERNAL MEDICINE

## 2023-04-26 PROCEDURE — 700111 HCHG RX REV CODE 636 W/ 250 OVERRIDE (IP)

## 2023-04-26 PROCEDURE — 700111 HCHG RX REV CODE 636 W/ 250 OVERRIDE (IP): Performed by: SURGERY

## 2023-04-26 PROCEDURE — 75625 CONTRAST EXAM ABDOMINL AORTA: CPT | Mod: 26 | Performed by: SURGERY

## 2023-04-26 PROCEDURE — 93005 ELECTROCARDIOGRAM TRACING: CPT

## 2023-04-26 PROCEDURE — 80048 BASIC METABOLIC PNL TOTAL CA: CPT

## 2023-04-26 PROCEDURE — 37221 PR REVASCULARIZE ILIAC ARTERY,ANGIOPLASTY/ST*: CPT | Mod: RT | Performed by: SURGERY

## 2023-04-26 PROCEDURE — 700102 HCHG RX REV CODE 250 W/ 637 OVERRIDE(OP): Performed by: SURGERY

## 2023-04-26 PROCEDURE — B41C1ZZ FLUOROSCOPY OF PELVIC ARTERIES USING LOW OSMOLAR CONTRAST: ICD-10-PCS | Performed by: SURGERY

## 2023-04-26 PROCEDURE — B4101ZZ FLUOROSCOPY OF ABDOMINAL AORTA USING LOW OSMOLAR CONTRAST: ICD-10-PCS | Performed by: SURGERY

## 2023-04-26 PROCEDURE — 85025 COMPLETE CBC W/AUTO DIFF WBC: CPT

## 2023-04-26 PROCEDURE — 700102 HCHG RX REV CODE 250 W/ 637 OVERRIDE(OP): Performed by: STUDENT IN AN ORGANIZED HEALTH CARE EDUCATION/TRAINING PROGRAM

## 2023-04-26 PROCEDURE — 047C3DZ DILATION OF RIGHT COMMON ILIAC ARTERY WITH INTRALUMINAL DEVICE, PERCUTANEOUS APPROACH: ICD-10-PCS | Performed by: SURGERY

## 2023-04-26 PROCEDURE — 700102 HCHG RX REV CODE 250 W/ 637 OVERRIDE(OP): Performed by: INTERNAL MEDICINE

## 2023-04-26 PROCEDURE — 75710 ARTERY X-RAYS ARM/LEG: CPT | Mod: 26,59,RT | Performed by: SURGERY

## 2023-04-26 PROCEDURE — 700105 HCHG RX REV CODE 258: Performed by: INTERNAL MEDICINE

## 2023-04-26 PROCEDURE — 700111 HCHG RX REV CODE 636 W/ 250 OVERRIDE (IP): Performed by: INTERNAL MEDICINE

## 2023-04-26 PROCEDURE — 99233 SBSQ HOSP IP/OBS HIGH 50: CPT | Performed by: STUDENT IN AN ORGANIZED HEALTH CARE EDUCATION/TRAINING PROGRAM

## 2023-04-26 PROCEDURE — 700111 HCHG RX REV CODE 636 W/ 250 OVERRIDE (IP): Performed by: STUDENT IN AN ORGANIZED HEALTH CARE EDUCATION/TRAINING PROGRAM

## 2023-04-26 PROCEDURE — A9270 NON-COVERED ITEM OR SERVICE: HCPCS | Performed by: SURGERY

## 2023-04-26 PROCEDURE — B41F1ZZ FLUOROSCOPY OF RIGHT LOWER EXTREMITY ARTERIES USING LOW OSMOLAR CONTRAST: ICD-10-PCS | Performed by: SURGERY

## 2023-04-26 PROCEDURE — 99153 MOD SED SAME PHYS/QHP EA: CPT

## 2023-04-26 PROCEDURE — 36200 PLACE CATHETER IN AORTA: CPT | Mod: 51,59 | Performed by: SURGERY

## 2023-04-26 PROCEDURE — A9270 NON-COVERED ITEM OR SERVICE: HCPCS | Performed by: NURSE PRACTITIONER

## 2023-04-26 PROCEDURE — 770001 HCHG ROOM/CARE - MED/SURG/GYN PRIV*

## 2023-04-26 PROCEDURE — 700102 HCHG RX REV CODE 250 W/ 637 OVERRIDE(OP): Performed by: NURSE PRACTITIONER

## 2023-04-26 PROCEDURE — 700117 HCHG RX CONTRAST REV CODE 255: Performed by: SURGERY

## 2023-04-26 RX ORDER — OXYCODONE HYDROCHLORIDE 10 MG/1
10 TABLET ORAL
Status: DISCONTINUED | OUTPATIENT
Start: 2023-04-26 | End: 2023-04-28 | Stop reason: HOSPADM

## 2023-04-26 RX ORDER — HYDROMORPHONE HYDROCHLORIDE 1 MG/ML
1 INJECTION, SOLUTION INTRAMUSCULAR; INTRAVENOUS; SUBCUTANEOUS
Status: DISCONTINUED | OUTPATIENT
Start: 2023-04-26 | End: 2023-04-28 | Stop reason: HOSPADM

## 2023-04-26 RX ORDER — PROTAMINE SULFATE 10 MG/ML
INJECTION, SOLUTION INTRAVENOUS
Status: COMPLETED
Start: 2023-04-26 | End: 2023-04-26

## 2023-04-26 RX ORDER — SODIUM CHLORIDE 9 MG/ML
500 INJECTION, SOLUTION INTRAVENOUS
Status: ACTIVE | OUTPATIENT
Start: 2023-04-26 | End: 2023-04-26

## 2023-04-26 RX ORDER — MIDAZOLAM HYDROCHLORIDE 1 MG/ML
.5-2 INJECTION INTRAMUSCULAR; INTRAVENOUS PRN
Status: ACTIVE | OUTPATIENT
Start: 2023-04-26 | End: 2023-04-26

## 2023-04-26 RX ORDER — IODIXANOL 270 MG/ML
65 INJECTION, SOLUTION INTRAVASCULAR ONCE
Status: COMPLETED | OUTPATIENT
Start: 2023-04-26 | End: 2023-04-26

## 2023-04-26 RX ORDER — HEPARIN SODIUM 1000 [USP'U]/ML
INJECTION, SOLUTION INTRAVENOUS; SUBCUTANEOUS
Status: COMPLETED
Start: 2023-04-26 | End: 2023-04-26

## 2023-04-26 RX ORDER — CLOPIDOGREL BISULFATE 75 MG/1
300 TABLET ORAL ONCE
Status: COMPLETED | OUTPATIENT
Start: 2023-04-26 | End: 2023-04-26

## 2023-04-26 RX ORDER — MIDAZOLAM HYDROCHLORIDE 1 MG/ML
INJECTION INTRAMUSCULAR; INTRAVENOUS
Status: COMPLETED
Start: 2023-04-26 | End: 2023-04-26

## 2023-04-26 RX ORDER — ONDANSETRON 2 MG/ML
4 INJECTION INTRAMUSCULAR; INTRAVENOUS PRN
Status: ACTIVE | OUTPATIENT
Start: 2023-04-26 | End: 2023-04-26

## 2023-04-26 RX ORDER — CLOPIDOGREL BISULFATE 75 MG/1
75 TABLET ORAL DAILY
Status: DISCONTINUED | OUTPATIENT
Start: 2023-04-27 | End: 2023-04-28 | Stop reason: HOSPADM

## 2023-04-26 RX ORDER — OXYCODONE HYDROCHLORIDE 15 MG/1
15 TABLET ORAL
Status: DISCONTINUED | OUTPATIENT
Start: 2023-04-26 | End: 2023-04-28 | Stop reason: HOSPADM

## 2023-04-26 RX ADMIN — MORPHINE SULFATE 30 MG: 30 TABLET, FILM COATED, EXTENDED RELEASE ORAL at 04:06

## 2023-04-26 RX ADMIN — OXYCODONE HYDROCHLORIDE 15 MG: 15 TABLET ORAL at 23:29

## 2023-04-26 RX ADMIN — MIDAZOLAM HYDROCHLORIDE 1 MG: 1 INJECTION, SOLUTION INTRAMUSCULAR; INTRAVENOUS at 09:16

## 2023-04-26 RX ADMIN — LOPERAMIDE HYDROCHLORIDE 4 MG: 2 CAPSULE ORAL at 22:31

## 2023-04-26 RX ADMIN — MORPHINE SULFATE 30 MG: 30 TABLET, FILM COATED, EXTENDED RELEASE ORAL at 17:35

## 2023-04-26 RX ADMIN — AMPICILLIN AND SULBACTAM 3 G: 1; 2 INJECTION, POWDER, FOR SOLUTION INTRAMUSCULAR; INTRAVENOUS at 00:42

## 2023-04-26 RX ADMIN — OXYCODONE HYDROCHLORIDE 10 MG: 10 TABLET ORAL at 00:40

## 2023-04-26 RX ADMIN — AMPICILLIN AND SULBACTAM 3 G: 1; 2 INJECTION, POWDER, FOR SOLUTION INTRAMUSCULAR; INTRAVENOUS at 05:26

## 2023-04-26 RX ADMIN — HEPARIN SODIUM 5000 UNITS: 5000 INJECTION, SOLUTION INTRAVENOUS; SUBCUTANEOUS at 14:20

## 2023-04-26 RX ADMIN — ACETAMINOPHEN 1000 MG: 500 TABLET, FILM COATED ORAL at 08:28

## 2023-04-26 RX ADMIN — ASPIRIN 81 MG 81 MG: 81 TABLET ORAL at 04:07

## 2023-04-26 RX ADMIN — IBUPROFEN 800 MG: 800 TABLET, FILM COATED ORAL at 04:07

## 2023-04-26 RX ADMIN — NICOTINE TRANSDERMAL SYSTEM 21 MG: 21 PATCH, EXTENDED RELEASE TRANSDERMAL at 04:05

## 2023-04-26 RX ADMIN — FENTANYL CITRATE 50 MCG: 50 INJECTION, SOLUTION INTRAMUSCULAR; INTRAVENOUS at 09:29

## 2023-04-26 RX ADMIN — HEPARIN SODIUM 5000 UNITS: 1000 INJECTION, SOLUTION INTRAVENOUS; SUBCUTANEOUS at 09:18

## 2023-04-26 RX ADMIN — FENTANYL CITRATE 50 MCG: 50 INJECTION, SOLUTION INTRAMUSCULAR; INTRAVENOUS at 09:52

## 2023-04-26 RX ADMIN — ACETAMINOPHEN 1000 MG: 500 TABLET, FILM COATED ORAL at 14:21

## 2023-04-26 RX ADMIN — OXYCODONE 5 MG: 5 TABLET ORAL at 08:28

## 2023-04-26 RX ADMIN — TRIAMCINOLONE ACETONIDE: 1 CREAM TOPICAL at 17:36

## 2023-04-26 RX ADMIN — IODIXANOL 65 ML: 270 INJECTION, SOLUTION INTRAVASCULAR at 10:08

## 2023-04-26 RX ADMIN — AMPICILLIN AND SULBACTAM 3 G: 1; 2 INJECTION, POWDER, FOR SOLUTION INTRAMUSCULAR; INTRAVENOUS at 23:28

## 2023-04-26 RX ADMIN — OXYCODONE HYDROCHLORIDE 15 MG: 15 TABLET ORAL at 19:49

## 2023-04-26 RX ADMIN — IBUPROFEN 800 MG: 800 TABLET, FILM COATED ORAL at 12:23

## 2023-04-26 RX ADMIN — HYDROMORPHONE HYDROCHLORIDE 1 MG: 1 INJECTION, SOLUTION INTRAMUSCULAR; INTRAVENOUS; SUBCUTANEOUS at 05:24

## 2023-04-26 RX ADMIN — FUROSEMIDE 40 MG: 20 TABLET ORAL at 12:22

## 2023-04-26 RX ADMIN — PREGABALIN 300 MG: 150 CAPSULE ORAL at 04:06

## 2023-04-26 RX ADMIN — PREGABALIN 300 MG: 150 CAPSULE ORAL at 17:36

## 2023-04-26 RX ADMIN — HYDROXYZINE HYDROCHLORIDE 25 MG: 25 TABLET, FILM COATED ORAL at 14:23

## 2023-04-26 RX ADMIN — ACETAMINOPHEN 1000 MG: 500 TABLET, FILM COATED ORAL at 20:40

## 2023-04-26 RX ADMIN — AMLODIPINE BESYLATE 5 MG: 10 TABLET ORAL at 04:06

## 2023-04-26 RX ADMIN — PROTAMINE SULFATE 30 MG: 10 INJECTION, SOLUTION INTRAVENOUS at 09:57

## 2023-04-26 RX ADMIN — HEPARIN SODIUM 5000 UNITS: 5000 INJECTION, SOLUTION INTRAVENOUS; SUBCUTANEOUS at 20:40

## 2023-04-26 RX ADMIN — TRIAMCINOLONE ACETONIDE: 1 CREAM TOPICAL at 04:05

## 2023-04-26 RX ADMIN — FENTANYL CITRATE 50 MCG: 50 INJECTION, SOLUTION INTRAMUSCULAR; INTRAVENOUS at 09:16

## 2023-04-26 RX ADMIN — AMPICILLIN AND SULBACTAM 3 G: 1; 2 INJECTION, POWDER, FOR SOLUTION INTRAMUSCULAR; INTRAVENOUS at 17:39

## 2023-04-26 RX ADMIN — OXYCODONE HYDROCHLORIDE 10 MG: 10 TABLET ORAL at 14:23

## 2023-04-26 RX ADMIN — CLOPIDOGREL BISULFATE 300 MG: 75 TABLET ORAL at 12:22

## 2023-04-26 RX ADMIN — MIDAZOLAM HYDROCHLORIDE 1 MG: 1 INJECTION, SOLUTION INTRAMUSCULAR; INTRAVENOUS at 09:33

## 2023-04-26 RX ADMIN — OXYCODONE HYDROCHLORIDE 10 MG: 10 TABLET ORAL at 04:07

## 2023-04-26 RX ADMIN — ATORVASTATIN CALCIUM 40 MG: 40 TABLET, FILM COATED ORAL at 17:36

## 2023-04-26 RX ADMIN — IBUPROFEN 800 MG: 800 TABLET, FILM COATED ORAL at 20:40

## 2023-04-26 RX ADMIN — AMPICILLIN AND SULBACTAM 3 G: 1; 2 INJECTION, POWDER, FOR SOLUTION INTRAMUSCULAR; INTRAVENOUS at 12:24

## 2023-04-26 ASSESSMENT — PAIN DESCRIPTION - PAIN TYPE
TYPE: ACUTE PAIN

## 2023-04-26 ASSESSMENT — ENCOUNTER SYMPTOMS
MYALGIAS: 1
CHILLS: 0
VOMITING: 0
ABDOMINAL PAIN: 0
SHORTNESS OF BREATH: 0
NAUSEA: 0
COUGH: 0
WEAKNESS: 0
DIARRHEA: 1
FEVER: 0

## 2023-04-26 NOTE — PROGRESS NOTES
Report received from Ashley PERRY, assumed care at 1900  A0x4  Pt declines any SOB on room air, chest pain, new onset of numbness/tingling  Pt rates pain at 8/10, on a scale of 1-10, pt medicated per MAR  + voiding   Pt has + flatus, + bowel sounds, + BM on 4/25  Pt ambulates independently  Pt is tolerating a  regular diet, pt denies any nausea/vomiting. NPO @0000  Plan of care discussed, all questions answered.Call light is within reach, treaded slipper socks on, bed in lowest/locked position, hourly rounding in place, all needs met at this time.

## 2023-04-26 NOTE — CARE PLAN
Problem: Knowledge Deficit - Standard  Goal: Patient and family/care givers will demonstrate understanding of plan of care, disease process/condition, diagnostic tests and medications  Outcome: Progressing     Problem: Pain - Standard  Goal: Alleviation of pain or a reduction in pain to the patient’s comfort goal  Outcome: Progressing     Problem: Knowledge Deficit - Standard  Goal: Patient and family/care givers will demonstrate understanding of plan of care, disease process/condition, diagnostic tests and medications  Outcome: Progressing     Problem: Pain - Standard  Goal: Alleviation of pain or a reduction in pain to the patient’s comfort goal  Outcome: Progressing   The patient is Stable - Low risk of patient condition declining or worsening    Shift Goals  Clinical Goals: pain control, NPO @ 0000  Patient Goals: pain control    Progress made toward(s) clinical / shift goals:  pts pain managed with prn pain medication.  POC discussed with pt, pt verbalized understanding of plan.

## 2023-04-26 NOTE — CARE PLAN
Problem: Knowledge Deficit - Standard  Goal: Patient and family/care givers will demonstrate understanding of plan of care, disease process/condition, diagnostic tests and medications  Outcome: Progressing     Problem: Respiratory  Goal: Patient will achieve/maintain optimum respiratory ventilation and gas exchange  Outcome: Progressing     Problem: Pain - Standard  Goal: Alleviation of pain or a reduction in pain to the patient’s comfort goal  Outcome: Progressing   The patient is Stable - Low risk of patient condition declining or worsening    Shift Goals  Clinical Goals: Pain Control/ Procedure Performed  Patient Goals: Procedure Performed    Progress made toward(s) clinical / shift goals:  Educated patient on plan of care this shift, pain medicated per MAR, Patient on Room air with O2 Sat >90%    Patient is not progressing towards the following goals:

## 2023-04-26 NOTE — PROGRESS NOTES
Assumed care of patient at 0645. Bedside report received. Assessment complete.  AA&Ox4. Denies CP/SOB.  Reporting 6/10 pain. Medicated per MAR.  Pulse Checks per Flowsheets  Educated patient regarding pharmacologic and non pharmacologic modalities for pain management.  Skin per flowsheets  Started on Regular diet. Denies N/V.  + void. Last BM 4/25  Pt ambulates SBA.  All needs met at this time. Call light within reach. Pt calls appropriately. Bed low and locked, non skid socks in place. Hourly rounding in place.

## 2023-04-26 NOTE — PROGRESS NOTES
[facility] Medicine Daily Progress Note    Date of Service  4/25/2023    Chief Complaint  Lorene Woodard is a 52 y.o. female admitted 4/22/2023 with foot infection    Hospital Course  53 yo woman with history of CAD, immunocompromise state due to psoriasis on biologic, hypertension, hyperlipidemia, COPD, tobacco abuse who went to [facility] with right foot swelling and redness after box falling onto the foot and she developed a poor healing wound in Jan 2023. She was admitted to [facility] on 4/20 for gangrenous right foot/cellulitis, acute panniculitis with cellulitis of lower abdominal wall. She was admitted to medicine service and was treated with Unasyn and vancomycin.  Patient also noted to have gram-positive cocci in blood cultures. She had CTA that showed occluded right iliac artery with collateral flow, BENI concerning for stenosis. Dr. Kaplan was consulted and recommended transfer to [facility] for potential vascular intervention.    Interval Problem Update  Patient seen and examined at bedside she does complain of some pain in the right foot however this is stable and unchanged.  She does feel like she is getting more sensation in the distal first and second toe which have clear necrosis, current medication doses are helping  - Blood culture from 4/20 showing strep pyogenes, resistance to azithro and erythro (scanned in Epic). Blood culture here have been neg. TTE did not show endocarditis but it was a limited study.  I have discussed with infectious disease Dr. Dewitt she is recommending continuation of Unasyn with possible transition to linezolid on discharge for total 2-week course   -I spoke with vascular surgery current plan is for IR iliac stent placement however if this is unable to be accommodated tomorrow with a plan to take 2 OR to place.   -I have placed orders for n.p.o. at midnight    I have discussed this patient's plan of care and discharge plan at IDT rounds today with Case Management, Nursing,  Nursing leadership, and other members of the IDT team.    Consultants/Specialty  infectious disease and vascular surgery    Code Status  Full Code    Disposition  Patient is not medically cleared for discharge.   Anticipate discharge to to home with close outpatient follow-up.  I have placed the appropriate orders for post-discharge needs.    Review of Systems  Review of Systems   Constitutional:  Positive for malaise/fatigue.   Respiratory:  Negative for shortness of breath.    Cardiovascular:  Positive for leg swelling. Negative for chest pain.   Gastrointestinal:  Positive for diarrhea (Improved). Negative for abdominal pain, nausea and vomiting.   Genitourinary:  Negative for dysuria.   Musculoskeletal:  Positive for myalgias.   Skin:  Positive for rash.   Neurological:  Negative for weakness.      Physical Exam  Temp:  [36 °C (96.8 °F)-37.1 °C (98.8 °F)] 36 °C (96.8 °F)  Pulse:  [60-67] 60  Resp:  [16-17] 17  BP: (142-165)/(72-91) 142/72  SpO2:  [93 %-97 %] 97 %    Physical Exam  Vitals and nursing note reviewed.   Constitutional:       Appearance: She is not toxic-appearing or diaphoretic.   HENT:      Head: Normocephalic.      Mouth/Throat:      Mouth: Mucous membranes are moist.   Eyes:      General:         Right eye: No discharge.         Left eye: No discharge.   Cardiovascular:      Rate and Rhythm: Normal rate and regular rhythm.   Pulmonary:      Effort: Pulmonary effort is normal. No respiratory distress.      Breath sounds: No wheezing or rales.   Abdominal:      General: There is no distension.      Palpations: Abdomen is soft.      Tenderness: There is no abdominal tenderness.   Musculoskeletal:      Cervical back: Neck supple.      Right lower leg: Edema present.      Left lower leg: Edema present.   Skin:     General: Skin is warm and dry.      Comments: Right foot, first and second toe with necrosis, no drainage seen, erythema up to mid foot, tenderness  Small area of ulceration to abdomen, mild  clear drainage, minimal erythema  Scaling plaques to left lower leg   Neurological:      Mental Status: She is alert and oriented to person, place, and time.       Fluids    Intake/Output Summary (Last 24 hours) at 4/25/2023 1709  Last data filed at 4/25/2023 1200  Gross per 24 hour   Intake 0 ml   Output --   Net 0 ml       Laboratory  Recent Labs     04/23/23  0033 04/24/23  0036 04/25/23  0310   WBC 6.4 5.7 6.2   RBC 3.74* 4.01* 3.98*   HEMOGLOBIN 10.5* 11.3* 11.1*   HEMATOCRIT 31.9* 34.4* 33.6*   MCV 85.3 85.8 84.4   MCH 28.1 28.2 27.9   MCHC 32.9* 32.8* 33.0*   RDW 43.5 43.0 43.2   PLATELETCT 207 240 248   MPV 9.8 9.4 9.1     Recent Labs     04/23/23 0033 04/24/23  0036 04/25/23  0310   SODIUM 141 144 142   POTASSIUM 3.8 3.8 3.3*   CHLORIDE 109 110 106   CO2 21 22 25   GLUCOSE 85 84 93   BUN 10 9 8   CREATININE 0.50 0.59 0.87   CALCIUM 8.4* 8.7 8.4*               Recent Labs     04/23/23 0033   TRIGLYCERIDE 64   HDL 23*   LDL 26       Imaging  EC-ECHOCARDIOGRAM COMPLETE W/ CONT   Final Result      DX-CHEST-LIMITED (1 VIEW)   Final Result      No evidence of acute cardiopulmonary process.      US-EXTREMITY ARTERY LOWER BILAT W/BENI (COMBO)    (Results Pending)   IR-EXTREMITY ANGIOGRAM-UNILATERAL RIGHT    (Results Pending)        Assessment/Plan  * Iliac artery stenosis, right (HCC)- (present on admission)  Assessment & Plan  Noted on CTA  [facility] for higher level of care  VTE ppx with heparin SQ  Vascular surgery consulted, planning for IR placement of right iliac stent however this is unable to be accommodated tomorrow vascular surgery will take 2 OR to complete  N.p.o. at midnight    Chronic pain syndrome  Assessment & Plan  Continue home pain meds -- MS contin, Oxycodone, Lyrica, Zanaflex  Patient will Tylenol and ibuprofen ordered for her pain    Nonhealing surgical wound  Assessment & Plan  Wound care is following appreciate their help    Chronic acquired  lymphedema  Assessment & Plan  Continue home lasix    Neuropathy  Assessment & Plan  Chronic, stable continue Lyrica    Insomnia  Assessment & Plan  PRN trazodone    Class 1 obesity in adult  Assessment & Plan  Diet and lifestyle modification  Body mass index is 34.59 kg/m².      Claudication of right lower extremity (HCC)  Assessment & Plan  Abnormal BENI noted on arterial duplex  ASA/statin  Vascular surgery was consulted, planning for stent placement tomorrow with IR  N.p.o. at midnight  Continue heparin subcu      Cellulitis  Assessment & Plan  Of right foot and abdominal wall  Improving continue IV Unasyn  ID is following    Gangrene (HCC)  Assessment & Plan  Right foot/toes gangre  Has underlying PAD/PVD, tobacco abuse  Foot wound culture is growing GAS, surgery and infectious disease following  Continue Unasyn plan for total 2-week course  N.p.o. for iliac stent placement tomorrow  No current surgical plan for the distal necrosis, will need to follow clinically post revascularization    Acute panniculitis  Assessment & Plan  Abdominal wall cellulitis/panniculitis, area appears to be healing on exam  Continue Unasyn  Abdominal wound cultures so far no growth  Wound care consulted    Immunocompromised state (HCC)  Assessment & Plan  Due to biologic treatment for psoriasis, hold during infection  Continue antibiotic care, ID is following appreciate their help    Bacteremia  Assessment & Plan  [place] 4/20 growing GAS  Repeat blood cultures so far no growth  TTE negative for endocarditis  I discussed with ID Dr. Dewitt Continue on Unasyn for now, likely transition to Zyvox on discharge for total 2-week course of antibiotics planned    Psoriatic arthritis (HCC)- (present on admission)  Assessment & Plan  On biologic agent with Enbrel, hold during infection  Followed by Rheumatology outpt  Will need outpatient follow-up to determine when this medication is safe to resume    Tobacco use- (present on  admission)  Assessment & Plan  Received smoking cessation counseling  Nicotine replacement has been ordered    Essential hypertension, benign- (present on admission)  Assessment & Plan  Bradycardic, metoprolol held  She is hypertensive  Continue Lasix, increase amlodipine from 2.5 to 5 mg  Continue to monitor continue current medication plan    CAD (coronary artery disease)- (present on admission)  Assessment & Plan  Stable, continue aspirin and statin         VTE prophylaxis: heparin ppx    I have performed a physical exam and reviewed and updated ROS and Plan today (4/25/2023). In review of yesterday's note (4/24/2023), there are no changes except as documented above.

## 2023-04-26 NOTE — PROGRESS NOTES
Pt presents to IR 1. Pt was consented by MD Zach Kaplan at bedside, confirmed by this RN and consent at bedside. Patient underwent a right lower extremity angiogram with possible intervention by Dr. Kaplan. Site marked and initialed by MD prior to procedure starting and verified by patient and procedure team. Pedal pulses prior to case Right tibiial on doppler and Left dorsalis pedal 1+. Patient was placed in a supine position. Right femoral artery was accessed. Vitals were taken every 5 minutes and remained stable during procedure (see doc flow sheet for results). CO2 waveform capnography was monitored and remained WNL throughout procedure.     Angio-Seal was used t(deployed at 0955) o achieve hemostasis. A gauze and tegaderm dressing was placed over surgical site. Report called to Naeem PERRY. Pt transported via gurney with RN to T433 in a stable condition.    [employer] LD Iliac /biliary over the wire 6Fr premounted stent system    REF: [id number]  LOT: [id number]  Exp: 2025-08-28      [employer] LD Iliac Premounted Stent System in right common iliac    REF: [id number]  LOT: [id number]  Exp: 2024-10-25

## 2023-04-26 NOTE — PROGRESS NOTES
Infectious Disease Progress Note    Author: Breanne Dewitt M.D. Date & Time of service: 2023  10:55 AM    Chief Complaint:  Follow-up for group A strep bacteremia, right toe necrosis and cellulitis    Interval History:   patient afebrile, WBC 6.2, creatinine 0.87.  Foot wound culture also growing group A streptococcus.  blood cultures negative to date.  Patient doing well without any new complaints.   patient afebrile, WBC 7.4.  Creatinine 0.72.  Patient just returned from surgery.  She now feels circulation down to her right foot after stent placement this morning      Labs Reviewed, Medications Reviewed, and Wound Reviewed.    Review of Systems:  Review of Systems   Constitutional:  Negative for chills and fever.   Respiratory:  Negative for cough and shortness of breath.    Gastrointestinal:  Positive for diarrhea. Negative for abdominal pain, nausea and vomiting.     Hemodynamics:  Temp (24hrs), Av.7 °C (98.1 °F), Min:36.6 °C (97.9 °F), Max:36.9 °C (98.4 °F)  Temperature: 36.6 °C (97.9 °F)  Pulse  Av.9  Min: 49  Max: 79   Blood Pressure: 101/83       Physical Exam:  Physical Exam  Vitals and nursing note reviewed.   Constitutional:       Appearance: Normal appearance. She is obese.   HENT:      Mouth/Throat:      Mouth: Mucous membranes are moist.   Eyes:      Extraocular Movements: Extraocular movements intact.      Pupils: Pupils are equal, round, and reactive to light.   Cardiovascular:      Rate and Rhythm: Normal rate.   Pulmonary:      Effort: Pulmonary effort is normal. No respiratory distress.      Breath sounds: No wheezing.   Abdominal:      Palpations: Abdomen is soft.      Tenderness: There is no abdominal tenderness.      Comments: Lower abdominal superficial wound-no active drainage or surrounding erythema   Musculoskeletal:      Comments: Right first and second toe necrosis with with resolving edema and erythema.    Bilateral lower extremity psoriatic changes-no signs of  active superimposed infection   Skin:     General: Skin is warm.   Neurological:      General: No focal deficit present.      Mental Status: She is alert and oriented to person, place, and time.   Psychiatric:         Mood and Affect: Mood normal.         Behavior: Behavior normal.       Meds:    Current Facility-Administered Medications:     NS    ondansetron    midazolam    fentaNYL    fentaNYL    aspirin EC    clopidogrel    [START ON 4/27/2023] clopidogrel    oxyCODONE immediate-release **OR** oxyCODONE immediate-release **OR** HYDROmorphone    nicotine **AND** Nicotine Replacement Patient Education Materials **AND** nicotine polacrilex    triamcinolone acetonide    lidocaine    lidocaine **OR** lidocaine    amLODIPine    loperamide    LR    labetalol    ondansetron    ondansetron    promethazine    promethazine    prochlorperazine    guaiFENesin dextromethorphan    heparin    ampicillin-sulbactam (UNASYN) IV    furosemide    aspirin    atorvastatin    hydrOXYzine HCl    morphine ER    pregabalin    tizanidine    [Held by provider] metoprolol tartrate    Pharmacy Consult Request    acetaminophen **FOLLOWED BY** [START ON 4/27/2023] acetaminophen    ibuprofen **FOLLOWED BY** [START ON 4/27/2023] ibuprofen    Labs:  Recent Labs     04/24/23 0036 04/25/23 0310 04/26/23  0233   WBC 5.7 6.2 7.4   RBC 4.01* 3.98* 4.09*   HEMOGLOBIN 11.3* 11.1* 11.4*   HEMATOCRIT 34.4* 33.6* 35.1*   MCV 85.8 84.4 85.8   MCH 28.2 27.9 27.9   RDW 43.0 43.2 43.0   PLATELETCT 240 248 276   MPV 9.4 9.1 9.2   NEUTSPOLYS 57.00 60.00 63.90   LYMPHOCYTES 29.80 26.80 22.90   MONOCYTES 8.40 7.40 7.30   EOSINOPHILS 3.90 5.00 5.00   BASOPHILS 0.50 0.50 0.40       Recent Labs     04/24/23 0036 04/25/23 0310 04/26/23  0233   SODIUM 144 142 140   POTASSIUM 3.8 3.3* 3.3*   CHLORIDE 110 106 103   CO2 22 25 24   GLUCOSE 84 93 110*   BUN 9 8 7*       Recent Labs     04/24/23 0036 04/25/23 0310 04/26/23  0233   CREATININE 0.59 0.87 0.72          Imaging:  DX-CHEST-LIMITED (1 VIEW)    Result Date: 2023 4:35 PM HISTORY/REASON FOR EXAM: Shortness of breath TECHNIQUE/EXAM DESCRIPTION AND NUMBER OF VIEWS: Single AP view of the chest. COMPARISON: None FINDINGS: There is no evidence of focal infiltrate or pulmonary edema. The heart is normal in size. There is no pleural effusion. Bony structures and soft tissues are unremarkable.     No evidence of acute cardiopulmonary process.    EC-ECHOCARDIOGRAM COMPLETE W/ CONT    Result Date: 2023  Transthoracic Echo Report Echocardiography Laboratory CONCLUSIONS Normal left ventricular systolic function. The left ventricular ejection fraction is visually estimated to be 55- 60%. Normal diastolic function. The right ventricle is not well visualized. Unable to estimate right ventricular systolic pressure due to an inadequate tricuspid regurgitant jet. Grossly normal left atrial size. Dilated inferior vena cava without inspiratory collapse. The cardiac valves are not well visualized in the apical views. No obvious valvular vegetation seen in the parasternal views. If there is a strong clinical concern for endocarditis and findings will change clinical management, recommend further evaluation with transesophageal echocardiogram. SHAHRZAD, [place] Exam Date:         2023                    10:22 Exam Location:     Inpatient Priority:          Routine Ordering Physician:        SHAQUILLE DREW Referring Physician: Sonographer:               Felicia OKEEFE Age:    52     Gender:    F MRN:    [id number] :    1970 BSA:    2.11   Ht (in):    67     Wt (lb):    220 Exam Type:     Complete Indications:     Acute/Subacute Bacterial Endocarditis ICD Codes:       421 CPT Codes:       44076 BP:   129    /   75     HR:   60 Technical Quality:       Fair MEASUREMENTS  (Male / Female) Normal Values 2D ECHO LV Diastolic Diameter PLAX        4.4 cm                4.2 - 5.9 / 3.9 - 5.3 cm LV Systolic  Diameter PLAX         3.1 cm                2.1 - 4.0 cm IVS Diastolic Thickness           0.9 cm                LVPW Diastolic Thickness          0.8 cm                LVOT Diameter                     2 cm                  Estimated LV Ejection Fraction    60 %                  LV Ejection Fraction MOD BP       62.1 %                >= 55  % LV Ejection Fraction MOD 4C       68.2 %                LV Ejection Fraction MOD 2C       56.8 %                LV Ejection Fraction 4C AL        68.9 %                LV Ejection Fraction 2C AL        58.5 %                DOPPLER AV Peak Velocity                  1.2 m/s               AV Peak Gradient                  5.7 mmHg              AV Mean Gradient                  2 mmHg                LVOT Peak Velocity                0.88 m/s              AV Area Cont Eq vti               2.9 cm2               Mitral E Point Velocity           1 m/s                 Mitral E to A Ratio               1.8                   MV Pressure Half Time             68 ms                 MV Area PHT                       3.2 cm2               MV Deceleration Time              233 ms                PV Peak Velocity                  0.94 m/s              PV Peak Gradient                  3.5 mmHg              LV E' Lateral Velocity            14.4 cm/s             Mitral E to LV E' Lateral Ratio   6.9                   LV E' Septal Velocity             12.1 cm/s             Mitral E to LV E' Septal Ratio    8.2                   * Indicates values subject to auto-interpretation LV EF:  60    % FINDINGS Left Ventricle The left ventricle is normal in size and thickness. Normal left ventricular wall thickness. Normal left ventricular systolic function. The left ventricular ejection fraction is visually estimated to be 55- 60%. Normal regional wall motion. Normal diastolic function. Right Ventricle The right ventricle is not well visualized. Right Atrium The right atrium is not well visualized.   Dilated inferior vena cava without inspiratory collapse. Left Atrium Grossly normal left atrial size. Mitral Valve Structurally normal mitral valve in the parasternal view without significant stenosis. Trace mitral regurgitation. Aortic Valve Structurally normal aortic valve in the parasternal view without significant stenosis or regurgitation. Tricuspid Valve Structurally normal tricuspid valve in the parasternal view without significant stenosis or regurgitation. Unable to estimate right ventricular systolic pressure due to an inadequate tricuspid regurgitant jet. Pulmonic Valve The pulmonic valve is not well visualized. No significant stenosis and trace pulmonic insufficiency by Doppler evaluation. Pericardium No pericardial effusion. Aorta Normal aortic root for body surface area. The ascending aorta diameter is 2.7 cm. Trell Nicholson MD (Electronically Signed) Final Date:     23 April 2023                 15:20      Micro:  Results       Procedure Component Value Units Date/Time    CULTURE WOUND W/ GRAM STAIN [[id number]]  (Abnormal) Collected: 04/22/23 2158    Order Status: Completed Specimen: Wound from Abdominal Updated: 04/25/23 1048     Significant Indicator POS     Source WND     Site Foot     Culture Result -     Gram Stain Result No organisms seen.     Culture Result Streptococcus pyogenes (Group A)  Light growth      Narrative:      CALL  [place]  141 tel. [phone],  CALLED  141 tel. [phone] 04/24/2023, 12:56, RB PERF. RESULTS CALLED  TO:Ashley Golden RN [GRPA Strep]  Collected By: [id number] PEG FORDE  Foot    Culture Wound W/ Gram Stain [[id number]] Collected: 04/22/23 2158    Order Status: Completed Specimen: Wound from Abdominal Updated: 04/25/23 1046     Significant Indicator NEG     Source WND     Site ABDOMINAL     Culture Result No growth at 48 hours.     Gram Stain Result Many WBCs.  No organisms seen.      Narrative:      Collected By: [id number] PEG FORDE  ABD (abdominal)    Urine  Culture [[id number]] Collected: 04/23/23 0100    Order Status: Completed Specimen: Urine, Clean Catch Updated: 04/25/23 0707     Significant Indicator NEG     Source UR     Site URINE, CLEAN CATCH     Culture Result Usual urogenital ct ,000 cfu/mL    Narrative:      Collected By: [id number] CUSHING DORAN S  If not done within the last 24 hours  Collected By: [id number] CUSHING DORAN S  Indication for culture:->Evaluation for sepsis without a  clear source of infection  Collected By: [id number] CUSHING DORAN S    Cdiff By PCR Rflx Toxin [[id number]] Collected: 04/23/23 1430    Order Status: Completed Updated: 04/23/23 1543     C Diff by PCR Negative     Comment: C. difficile NOT detected by PCR.  Treatment not indicated per guidelines.  Repeat testing not indicated within 7 days.          027-NAP1-BI Presumptive Negative     Comment: Presumptive 027/NAP1/BI target DNA sequences are NOT DETECTED.       C Diff by PCR rflx Toxin [[id number]]     Order Status: Canceled Specimen: Stool     Blood Culture [[id number]] Collected: 04/22/23 1806    Order Status: Completed Specimen: Blood Updated: 04/23/23 0729     Significant Indicator NEG     Source BLD     Site Peripheral     Culture Result No Growth  Note: Blood cultures are incubated for 5 days and  are monitored continuously.Positive blood cultures  are called to the RN and reported as soon as  they are identified.      Narrative:      Pt is on antibiotics, results may be compromised.  04/22/2023  18:52  Left Forearm/Arm    Blood Culture [[id number]] Collected: 04/22/23 1806    Order Status: Completed Specimen: Blood Updated: 04/23/23 0729     Significant Indicator NEG     Source BLD     Site Peripheral     Culture Result No Growth  Note: Blood cultures are incubated for 5 days and  are monitored continuously.Positive blood cultures  are called to the RN and reported as soon as  they are identified.      Narrative:      Previous comment was modified by SHERIN at 19:05 on  "04/22/23.  Pt is on antibiotics, results may be compromised.  04/22/2023  18:55  ?Ordered form hard req #[id number]  ?On req it also notes \" Antiobiotics administeres, was paused when  ?drawn\"  ? 04/22/2023  18:57  Pt is on antibiotics, results may be compromised.  04/22/2023  18:55  Left Forearm/Arm    GRAM STAIN [[id number]] Collected: 04/22/23 2158    Order Status: Completed Specimen: Wound Updated: 04/23/23 0515     Significant Indicator .     Source WND     Site Foot     Gram Stain Result No organisms seen.    Narrative:      Collected By: [id number] PEG EULA  Foot    GRAM STAIN [[id number]] Collected: 04/22/23 2158    Order Status: Completed Specimen: Wound Updated: 04/23/23 0515     Significant Indicator .     Source WND     Site ABDOMINAL     Gram Stain Result Many WBCs.  No organisms seen.      Narrative:      Collected By: [id number] PEG EULA  ABD (abdominal)    Urinalysis [[id number]] Collected: 04/23/23 0100    Order Status: Completed Specimen: Urine, Clean Catch Updated: 04/23/23 0136     Color Yellow     Character Clear     Specific Gravity 1.008     Ph 7.0     Glucose Negative mg/dL      Ketones Negative mg/dL      Protein Negative mg/dL      Bilirubin Negative     Urobilinogen, Urine 0.2     Nitrite Negative     Leukocyte Esterase Negative     Occult Blood Negative     Micro Urine Req see below     Comment: Microscopic examination not performed when specimen is clear  and chemically negative for protein, blood, leukocyte esterase  and nitrite.         Narrative:      Collected By: [id number] CUSHING DORAN S  If not done within the last 24 hours  Collected By: [id number] CUSHING MILTON S  Indication for culture:->Evaluation for sepsis without a  clear source of infection    S. Aureus By PCR, Nasal Complete [[id number]] Collected: 04/22/23 2144    Order Status: Completed Specimen: Respirate from Respiratory Updated: 04/22/23 2310     Staph aureus by PCR Negative     MRSA by PCR Negative    Narrative:   "    Collected By: [id number] PEG FORDE    Blood Culture [[id number]]     Order Status: No result Specimen: Blood from Peripheral     Blood Culture [[id number]]     Order Status: No result Specimen: Blood from Peripheral             Assessment:  52 y.o. woman with a history of COPD, tobacco dependence, hypertension, and peripheral arterial disease admitted on 4/22/2023 from [facility] where she presented with worsening right foot swelling and erythema.  Patient found to have right foot cellulitis with necrotic first and second toe and CTA findings revealing an occluded right iliac artery.  She was also found to have positive blood cultures on 4/20 at the outside hospital with group A streptococcus.  Blood cultures here on 4/22 are negative to date.    Pertinent diagnoses:  1.  Group A strep bacteremia   2.  Right great toe and second toe necrosis  3.  Right foot cellulitis  4.  Peripheral arterial disease  5.  COPD  6.  Tobacco dependence  7.  Hypertension  8.  Psoriasis on Cosentyx  9.  Immunosuppressed state  10.  Abdominal wound  11.  Diarrhea, C. difficile PCR negative    Plan:  -Continue IV Unasyn 3 g every 6 hours  - Status post right lower extremity angiogram with stent placement in the right common iliac artery on 4/26/2023  -Blood culture on 4/22-negative to date  -Anticipate a 14-day course of antibiotics for treatment of bacteremia and right foot infection with stop date of 05/06/2023  -At discharge, transition to oral linezolid 600 mg twice daily to complete antibiotic course.  Please ensure patient can obtain linezolid prior to discharge as this may require prior authorization  -Advised tobacco cessation  -Monitor right lower extremity after procedure     Disposition: Home.  Patient lives in [place]     Midline: No, oral antibiotic options available     I have performed a physical exam and reviewed and updated ROS and plan today 4/26/2023.  In review of yesterday's note 4/25/2023, there  are no changes except as documented above.    Discussed with internal medicine/Dr. Miles

## 2023-04-26 NOTE — OP REPORT
VASCULAR SURGERY SERVICE                       Operative Note  _____________________________________________________    Date of Service:           4/26/2023   Patient Name:              Lorene Woodard  Patient MRN:               [id number]  _____________________________________________________    Preoperative Diagnosis:  Peripheral arterial occlusive disease with arterial insufficiency and nonhealing wound of the right lower extremity    Postoperative Diagnosis:  Peripheral arterial occlusive disease with arterial insufficiency and nonhealing wound of the right lower extremity    Procedure:  92696 Percutaneous access of the right common femoral artery with ultrasound guidance  76220 Nonselective catheterization of the infrarenal aorta  20416 Aortoiliac angiogram  33776 7mm by 37mm and 7mm by 17mm balloon expandable uncovered stent placement in the right common iliac artery  70267 Right lower extremity angiography   Closure of the right common femoral artery access site with 6 Japanese Angio-Seal closure device which deployed successfully    _____________________________________________________      Surgeon:                                 Marlon Kaplan MD    Assistant:   None    Anesthesia:                             Sedation plus local anesthetic    EBL:                                        minimal    Findings:   1-vessel tibial runoff via the PT and good perfusion of the right foot    Complications:                        none    Disposition:                             Tolerated well, sent to recovery in stable condition    _____________________________________________________      Procedure Summary:  The patient was properly identified in the preoperative area, taken to the operating room, and placed in a supine position and IV sedation was given to make patient comfortable.  The patient was prepped and draped in the usual sterile fashion.  Surgical timeout was called to identify the correct  patient, procedure, and equipment.  Everyone was in agreement.    Local anesthetic was infiltrated and then we accessed the right common femoral artery percutaneously with ultrasound guidance we dilated this up to a 6 [place]e sheath.  The patient was systemically heparinized.  The right common iliac artery occlusion was carefully crossed with an angled glide catheter and straight Glidewire.  The catheter was advanced up into the infrarenal aorta and an aortoiliac angiogram was performed.  This showed a nearly flush occlusion of the right common iliac artery at the bifurcation which extended down for about 4.2 cm.  The right iliac bifurcation is patent with good luminal diameter of the right external iliac and internal iliac arteries.    We deployed a 7 mm x 37 mm balloon expandable uncovered stent from the origin of the right common iliac artery down to the mid common iliac artery, and then the stent was extended with a 7 mm x 17 mm balloon expandable uncovered stent to complete treatment of the entire occluded segment.  Completion angiogram showed excellent patency of the stent with no significant encroachment into the left common iliac artery origin, and no extravasation.  I then used the right femoral sheath to perform a runoff angiography of the right lower extremity.  This showed excellent patency of the femoral and popliteal vessels.  There is single-vessel posterior tibial runoff.  The anterior tibial and peroneal arteries demonstrate chronic long segment occlusion without distal reconstitution.  There is good perfusion of the right foot via the posterior tibial artery filling the plantar arch and there appears to be good perfusion of the toes.    The sheath was removed and the site was closed with 6 [place]e Angio-Seal closure device which deployed successfully.  Manual pressure was held for 5 minutes to reduce swelling and then a dry sterile dressing was placed.  Patient tolerated the well and was sent to  recovery in stable condition.      Marlon Kaplan MD  [facility]   [place] preferred or call my office [phone]  _____________________________________________________  Patient:Lorene Woodard   MRN:[id number]

## 2023-04-26 NOTE — PROGRESS NOTES
[facility] Medicine Daily Progress Note    Date of Service  4/26/2023    Chief Complaint  Lorene Woodard is a 52 y.o. female admitted 4/22/2023 with foot infection    Hospital Course  53 yo woman with history of CAD, immunocompromise state due to psoriasis on biologic, hypertension, hyperlipidemia, COPD, tobacco abuse who went to [place] [facility] with right foot swelling and redness after box falling onto the foot and she developed a poor healing wound in Jan 2023. She was admitted to [facility] on 4/20 for gangrenous right foot/cellulitis, acute panniculitis with cellulitis of lower abdominal wall. She was admitted to medicine service and was treated with Unasyn and vancomycin.  Patient also noted to have gram-positive cocci in blood cultures. She had CTA that showed occluded right iliac artery with collateral flow, BENI concerning for stenosis. Dr. Kaplan was consulted and recommended transfer to [facility] for potential vascular intervention.    Interval Problem Update  Patient seen and examined at bedside, she is post op Rt iliac stent placement with vascular surgery. Is having increased pain in the right foot post surgery, described as extreme heat, is quite painful. She looks uncomfortable. No pain and access site in Rt groin. Likely pain from reperfusion.   - I have increased patients oral oxy dosing to help better treat pain  - discussed with vascular surgery Dr. Kaplan, procedure went well with excellent blood flow postintervention possibly discharge home tomorrow pending how she does overnight.  She has been started on Plavix and aspirin  -I Discussed with ID Dr. Dewitt continue Unasyn while inpatient with a plan to complete a total 14-day course with a stop date of 5/6/2023 plan to transition to Zyvox on discharge.   - pt will likely benefit from outpatient wound care I have placed a referral.  I have reached out to inpatient wound team to get their recommendations moving forward, they are in agreement with  outpatient wound care.  Appreciate their help      I have discussed this patient's plan of care and discharge plan at IDT rounds today with Case Management, Nursing, Nursing leadership, and other members of the IDT team.    Consultants/Specialty  infectious disease and vascular surgery    Code Status  Full Code    Disposition  Patient is not medically cleared for discharge.   Anticipate discharge to to home with close outpatient follow-up.  I have placed the appropriate orders for post-discharge needs.    Review of Systems  Review of Systems   Constitutional:  Positive for malaise/fatigue.   Respiratory:  Negative for shortness of breath.    Cardiovascular:  Positive for leg swelling. Negative for chest pain.   Gastrointestinal:  Positive for diarrhea (Improved). Negative for abdominal pain, nausea and vomiting.   Genitourinary:  Negative for dysuria.   Musculoskeletal:  Positive for myalgias.   Skin:  Positive for rash.   Neurological:  Negative for weakness.      Physical Exam  Temp:  [36.6 °C (97.9 °F)-36.9 °C (98.4 °F)] 36.6 °C (97.9 °F)  Pulse:  [61-79] 71  Resp:  [9-19] 18  BP: (101-138)/(57-86) 138/78  SpO2:  [91 %-98 %] 94 %    Physical Exam  Vitals and nursing note reviewed.   Constitutional:       Appearance: She is not toxic-appearing or diaphoretic.   HENT:      Head: Normocephalic.      Mouth/Throat:      Mouth: Mucous membranes are moist.   Eyes:      General:         Right eye: No discharge.         Left eye: No discharge.   Cardiovascular:      Rate and Rhythm: Normal rate and regular rhythm.   Pulmonary:      Effort: Pulmonary effort is normal. No respiratory distress.      Breath sounds: No wheezing or rales.   Abdominal:      General: There is no distension.      Palpations: Abdomen is soft.      Tenderness: There is no abdominal tenderness.   Musculoskeletal:      Cervical back: Neck supple.      Right lower leg: Edema present.      Left lower leg: Edema present.      Comments: Dressing to right  groin, CDI   Skin:     General: Skin is warm and dry.      Comments: Right foot, first and second toe with necrosis, no drainage seen, erythema to the right hallux and second toe as well as some erythema on the dorsum, slightly more today likely secondary to reperfusion  Small area of ulceration to abdomen, mild clear drainage, minimal erythema  Scaling plaques to left lower leg   Neurological:      Mental Status: She is alert and oriented to person, place, and time.       Fluids    Intake/Output Summary (Last 24 hours) at 4/26/2023 1557  Last data filed at 4/26/2023 1007  Gross per 24 hour   Intake 240 ml   Output --   Net 240 ml       Laboratory  Recent Labs     04/24/23  0036 04/25/23  0310 04/26/23  0233   WBC 5.7 6.2 7.4   RBC 4.01* 3.98* 4.09*   HEMOGLOBIN 11.3* 11.1* 11.4*   HEMATOCRIT 34.4* 33.6* 35.1*   MCV 85.8 84.4 85.8   MCH 28.2 27.9 27.9   MCHC 32.8* 33.0* 32.5*   RDW 43.0 43.2 43.0   PLATELETCT 240 248 276   MPV 9.4 9.1 9.2     Recent Labs     04/24/23  0036 04/25/23  0310 04/26/23  0233   SODIUM 144 142 140   POTASSIUM 3.8 3.3* 3.3*   CHLORIDE 110 106 103   CO2 22 25 24   GLUCOSE 84 93 110*   BUN 9 8 7*   CREATININE 0.59 0.87 0.72   CALCIUM 8.7 8.4* 8.6                       Imaging  EC-ECHOCARDIOGRAM COMPLETE W/ CONT   Final Result      DX-CHEST-LIMITED (1 VIEW)   Final Result      No evidence of acute cardiopulmonary process.      IR-EXTREMITY ANGIOGRAM-UNILATERAL RIGHT    (Results Pending)        Assessment/Plan  * Iliac artery stenosis, right (HCC)- (present on admission)  Assessment & Plan  Noted on CTA  Transferred from [facility] to [facility] for higher level of care  VTE ppx with heparin SQ  Status post stenting to the right iliac artery by vascular surgery, discussed with surgery she had excellent flow postprocedure  Diet resumed  Patient started on aspirin and Plavix    Chronic pain syndrome  Assessment & Plan  Continue home pain meds -- MS contin, Oxycodone, Lyrica, Zanaflex  Patient  will Tylenol and ibuprofen ordered for her pain  Patient is having increased foot pain after stent placement likely from reperfusion I have increased her oxycodone    Nonhealing surgical wound  Assessment & Plan  Wound care is following appreciate their help    Chronic acquired lymphedema  Assessment & Plan  Continue home lasix    Neuropathy  Assessment & Plan  Chronic, stable continue Lyrica    Insomnia  Assessment & Plan  PRN trazodone    Class 1 obesity in adult  Assessment & Plan  Diet and lifestyle modification  Body mass index is 34.59 kg/m².      Claudication of right lower extremity ([facility])  Assessment & Plan  Abnormal BENI noted on arterial duplex  ASA/statin  Vascular surgery was consulted, stent placed today 426 with good flow  Patient started on aspirin and Plavix      Cellulitis  Assessment & Plan  Of right foot and abdominal wall  Improving continue IV Unasyn  ID is following    Gangrene ([facility])  Assessment & Plan  Right foot/toes gangre  Has underlying PAD/PVD, tobacco abuse  Foot wound culture is growing GAS, surgery and infectious disease following  Continue Unasyn plan for total 2-week course, changed to Zyvox on discharge to complete course  Status post iliac stent by vascular surgery today with good flow, started on aspirin and Plavix  No surgical plan for necrosis on the distal toes, patient would benefit from outpatient follow-up with wound care I have placed a referral and discussed with inpatient wound care who are in agreement    Acute panniculitis  Assessment & Plan  Abdominal wall cellulitis/panniculitis, area appears to be healing on exam  Continue Unasyn  Abdominal wound cultures so far no growth  Doing well outpatient wound referral has been placed    Immunocompromised state ([facility])  Assessment & Plan  Due to biologic treatment for psoriasis, hold during infection  Continue antibiotic care, ID is following appreciate their help    Bacteremia  Assessment & Plan  [place] 4/20 growing  GAS  Repeat blood cultures so far no growth  TTE negative for endocarditis  I discussed with ID Dr. Dewitt Continue on Unasyn for now, will transition to Zyvox on discharge for total 2-week course of antibiotics planned    Psoriatic arthritis (HCC)- (present on admission)  Assessment & Plan  On biologic agent with Enbrel, hold during infection  Followed by Rheumatology outpt  Will need outpatient follow-up to determine when this medication is safe to resume    Tobacco use- (present on admission)  Assessment & Plan  Received smoking cessation counseling  Nicotine replacement has been ordered    Essential hypertension, benign- (present on admission)  Assessment & Plan  Bradycardic, metoprolol held  She is hypertensive  Continue Lasix, increase amlodipine from 2.5 to 5 mg  Continue to monitor continue current medication plan    CAD (coronary artery disease)- (present on admission)  Assessment & Plan  Stable, continue aspirin and statin         VTE prophylaxis: heparin ppx    I have performed a physical exam and reviewed and updated ROS and Plan today (4/26/2023). In review of yesterday's note (4/25/2023), there are no changes except as documented above.

## 2023-04-26 NOTE — PROGRESS NOTES
VASCULAR SURGERY SERVICE                        Progress Note  _________________________________    No acute changes  Cellulitis appears to be improving    Planning right iliac stent today tentatively around 0830  NPO except luis eduardo Kaplan MD  [facility]   [employer] preferred or call my office [phone]  __________________________________________________  Patient:Lorene Woodard   MRN:[id number]

## 2023-04-27 PROBLEM — R00.0 TACHYCARDIA: Status: ACTIVE | Noted: 2023-04-27

## 2023-04-27 LAB
ANION GAP SERPL CALC-SCNC: 12 MMOL/L (ref 7–16)
BACTERIA BLD CULT: NORMAL
BACTERIA BLD CULT: NORMAL
BUN SERPL-MCNC: 9 MG/DL (ref 8–22)
CALCIUM SERPL-MCNC: 8.6 MG/DL (ref 8.5–10.5)
CHLORIDE SERPL-SCNC: 104 MMOL/L (ref 96–112)
CO2 SERPL-SCNC: 24 MMOL/L (ref 20–33)
CREAT SERPL-MCNC: 0.84 MG/DL (ref 0.5–1.4)
EKG IMPRESSION: NORMAL
GFR SERPLBLD CREATININE-BSD FMLA CKD-EPI: 84 ML/MIN/1.73 M 2
GLUCOSE SERPL-MCNC: 94 MG/DL (ref 65–99)
MAGNESIUM SERPL-MCNC: 1.9 MG/DL (ref 1.5–2.5)
POTASSIUM SERPL-SCNC: 3.3 MMOL/L (ref 3.6–5.5)
SIGNIFICANT IND 70042: NORMAL
SIGNIFICANT IND 70042: NORMAL
SITE SITE: NORMAL
SITE SITE: NORMAL
SODIUM SERPL-SCNC: 140 MMOL/L (ref 135–145)
SOURCE SOURCE: NORMAL
SOURCE SOURCE: NORMAL

## 2023-04-27 PROCEDURE — 700102 HCHG RX REV CODE 250 W/ 637 OVERRIDE(OP): Performed by: INTERNAL MEDICINE

## 2023-04-27 PROCEDURE — 93010 ELECTROCARDIOGRAM REPORT: CPT | Performed by: INTERNAL MEDICINE

## 2023-04-27 PROCEDURE — A9270 NON-COVERED ITEM OR SERVICE: HCPCS | Performed by: INTERNAL MEDICINE

## 2023-04-27 PROCEDURE — 700102 HCHG RX REV CODE 250 W/ 637 OVERRIDE(OP): Performed by: SURGERY

## 2023-04-27 PROCEDURE — 700105 HCHG RX REV CODE 258: Performed by: INTERNAL MEDICINE

## 2023-04-27 PROCEDURE — 83735 ASSAY OF MAGNESIUM: CPT

## 2023-04-27 PROCEDURE — 700102 HCHG RX REV CODE 250 W/ 637 OVERRIDE(OP): Performed by: NURSE PRACTITIONER

## 2023-04-27 PROCEDURE — 700111 HCHG RX REV CODE 636 W/ 250 OVERRIDE (IP): Performed by: STUDENT IN AN ORGANIZED HEALTH CARE EDUCATION/TRAINING PROGRAM

## 2023-04-27 PROCEDURE — 700102 HCHG RX REV CODE 250 W/ 637 OVERRIDE(OP): Performed by: STUDENT IN AN ORGANIZED HEALTH CARE EDUCATION/TRAINING PROGRAM

## 2023-04-27 PROCEDURE — 700111 HCHG RX REV CODE 636 W/ 250 OVERRIDE (IP): Performed by: INTERNAL MEDICINE

## 2023-04-27 PROCEDURE — 770001 HCHG ROOM/CARE - MED/SURG/GYN PRIV*

## 2023-04-27 PROCEDURE — A9270 NON-COVERED ITEM OR SERVICE: HCPCS | Performed by: SURGERY

## 2023-04-27 PROCEDURE — A9270 NON-COVERED ITEM OR SERVICE: HCPCS | Performed by: STUDENT IN AN ORGANIZED HEALTH CARE EDUCATION/TRAINING PROGRAM

## 2023-04-27 PROCEDURE — 80048 BASIC METABOLIC PNL TOTAL CA: CPT

## 2023-04-27 PROCEDURE — A9270 NON-COVERED ITEM OR SERVICE: HCPCS | Performed by: NURSE PRACTITIONER

## 2023-04-27 PROCEDURE — 99233 SBSQ HOSP IP/OBS HIGH 50: CPT | Performed by: INTERNAL MEDICINE

## 2023-04-27 PROCEDURE — 99232 SBSQ HOSP IP/OBS MODERATE 35: CPT | Performed by: STUDENT IN AN ORGANIZED HEALTH CARE EDUCATION/TRAINING PROGRAM

## 2023-04-27 RX ORDER — SODIUM CHLORIDE 9 MG/ML
INJECTION, SOLUTION INTRAVENOUS CONTINUOUS
Status: DISCONTINUED | OUTPATIENT
Start: 2023-04-27 | End: 2023-04-28 | Stop reason: HOSPADM

## 2023-04-27 RX ORDER — LINEZOLID 600 MG/1
600 TABLET, FILM COATED ORAL EVERY 12 HOURS
Status: DISCONTINUED | OUTPATIENT
Start: 2023-04-27 | End: 2023-04-28 | Stop reason: HOSPADM

## 2023-04-27 RX ADMIN — TRIAMCINOLONE ACETONIDE: 1 CREAM TOPICAL at 05:13

## 2023-04-27 RX ADMIN — MORPHINE SULFATE 30 MG: 30 TABLET, FILM COATED, EXTENDED RELEASE ORAL at 05:13

## 2023-04-27 RX ADMIN — ASPIRIN 81 MG: 81 TABLET, COATED ORAL at 05:12

## 2023-04-27 RX ADMIN — AMPICILLIN AND SULBACTAM 3 G: 1; 2 INJECTION, POWDER, FOR SOLUTION INTRAMUSCULAR; INTRAVENOUS at 05:21

## 2023-04-27 RX ADMIN — ACETAMINOPHEN 1000 MG: 500 TABLET, FILM COATED ORAL at 14:42

## 2023-04-27 RX ADMIN — ATORVASTATIN CALCIUM 40 MG: 40 TABLET, FILM COATED ORAL at 17:30

## 2023-04-27 RX ADMIN — PREGABALIN 300 MG: 150 CAPSULE ORAL at 17:30

## 2023-04-27 RX ADMIN — IBUPROFEN 800 MG: 800 TABLET, FILM COATED ORAL at 05:13

## 2023-04-27 RX ADMIN — OXYCODONE HYDROCHLORIDE 10 MG: 10 TABLET ORAL at 22:58

## 2023-04-27 RX ADMIN — HEPARIN SODIUM 5000 UNITS: 5000 INJECTION, SOLUTION INTRAVENOUS; SUBCUTANEOUS at 22:00

## 2023-04-27 RX ADMIN — PREGABALIN 300 MG: 150 CAPSULE ORAL at 05:13

## 2023-04-27 RX ADMIN — ACETAMINOPHEN 1000 MG: 500 TABLET, FILM COATED ORAL at 08:46

## 2023-04-27 RX ADMIN — CLOPIDOGREL BISULFATE 75 MG: 75 TABLET ORAL at 05:13

## 2023-04-27 RX ADMIN — IBUPROFEN 800 MG: 800 TABLET, FILM COATED ORAL at 12:18

## 2023-04-27 RX ADMIN — MORPHINE SULFATE 30 MG: 30 TABLET, FILM COATED, EXTENDED RELEASE ORAL at 17:30

## 2023-04-27 RX ADMIN — FUROSEMIDE 40 MG: 20 TABLET ORAL at 05:12

## 2023-04-27 RX ADMIN — LINEZOLID 600 MG: 600 TABLET, FILM COATED ORAL at 08:47

## 2023-04-27 RX ADMIN — TRIAMCINOLONE ACETONIDE: 1 CREAM TOPICAL at 17:30

## 2023-04-27 RX ADMIN — HEPARIN SODIUM 5000 UNITS: 5000 INJECTION, SOLUTION INTRAVENOUS; SUBCUTANEOUS at 05:13

## 2023-04-27 RX ADMIN — LINEZOLID 600 MG: 600 TABLET, FILM COATED ORAL at 17:30

## 2023-04-27 RX ADMIN — OXYCODONE HYDROCHLORIDE 10 MG: 10 TABLET ORAL at 05:14

## 2023-04-27 RX ADMIN — HEPARIN SODIUM 5000 UNITS: 5000 INJECTION, SOLUTION INTRAVENOUS; SUBCUTANEOUS at 14:42

## 2023-04-27 RX ADMIN — METOPROLOL TARTRATE 50 MG: 50 TABLET, FILM COATED ORAL at 17:30

## 2023-04-27 RX ADMIN — NICOTINE TRANSDERMAL SYSTEM 21 MG: 21 PATCH, EXTENDED RELEASE TRANSDERMAL at 05:13

## 2023-04-27 RX ADMIN — AMLODIPINE BESYLATE 5 MG: 10 TABLET ORAL at 05:13

## 2023-04-27 ASSESSMENT — PAIN DESCRIPTION - PAIN TYPE
TYPE: ACUTE PAIN

## 2023-04-27 ASSESSMENT — ENCOUNTER SYMPTOMS
MYALGIAS: 1
NAUSEA: 0
DIARRHEA: 1
VOMITING: 0
SHORTNESS OF BREATH: 0
CHILLS: 0
WEAKNESS: 0
ABDOMINAL PAIN: 0
FEVER: 0
COUGH: 0
ROS GI COMMENTS: IMPROVED

## 2023-04-27 NOTE — ASSESSMENT & PLAN NOTE
Patient developed asymptomatic tachycardia overnight heart rates sustained in 130s patient was asymptomatic and was sleeping at the time  Was having diarrhea yesterday, possible dehydration versus rebound tachycardia as her home beta-blocker had been held?  Resume metoprolol  We will monitor for additional 24 hours prior to discharge

## 2023-04-27 NOTE — CARE PLAN
Problem: Knowledge Deficit - Standard  Goal: Patient and family/care givers will demonstrate understanding of plan of care, disease process/condition, diagnostic tests and medications  Outcome: Progressing     Problem: Urinary - Renal Perfusion  Goal: Ability to achieve and maintain adequate renal perfusion and functioning will improve  Outcome: Progressing     Problem: Respiratory  Goal: Patient will achieve/maintain optimum respiratory ventilation and gas exchange  Outcome: Progressing   The patient is Stable - Low risk of patient condition declining or worsening    Shift Goals  Clinical Goals: Pain Control/ Monitor Pulses  Patient Goals: Pain Control  Family Goals: N/A    Progress made toward(s) clinical / shift goals:  Pain controlled per MAR, educated patient on plan of care this shift, patient verbalized understanding     Patient is not progressing towards the following goals:

## 2023-04-27 NOTE — PROGRESS NOTES
"  VASCULAR SURGERY SERVICE  Progress Note  ___________________________________    4/26/23: right common iliac artery stent placed     4/27/2023:  CORINA, pain minimal, foot feels better after revascularization    Vitals  /72   Pulse 98   Temp 36.3 °C (97.3 °F) (Temporal)   Resp 16   Ht 1.702 m (5' 7.01\")   Wt 100 kg (220 lb 14.4 oz)   LMP 03/13/2017   SpO2 92%   Breastfeeding No   BMI 34.59 kg/m²     Exam  General: alert, conversant, NAD  Extremities: right groin access site CDI with no hematoma, right foot well perfused with brisk PT/DP signals, mild edema, increased sensation in the forefoot and toes      A/P)  Doing well  Home anytime from surgical standpoint  ASA 81mg daily indefinitely  Plavix 75mg daily for 1 month  Planning to allow the toes to demarcate for now instead of toe amputation, patient advised to paint with betadine BID     My office will arrange follow-up in 3 months to assess perfusion and progress of the toes, but patient can follow-up sooner if concerns arise.    Appreciate Hospitalist services support    Vascular surgery signing off but available anytime, if questions or concerns arise please notify vascular surgeon on call      Marlon Kaplan MD  [facility]  [employer] preferred or call my office [phone]  __________________________________________________________________  Patient:Lorene Woodard   MRN:[id number]   CSN:[id number]      "

## 2023-04-27 NOTE — DISCHARGE PLANNING
Case Management Discharge Planning    Admission Date: 4/22/2023  GMLOS: 3.9  ALOS: 5    6-Clicks ADL Score: 24  6-Clicks Mobility Score: 24      Anticipated Discharge Dispo:  Home with Outpatient Wound Care.    DME Needed: No    Action(s) Taken: OTHER. Discussed this case during Morning Huddle. Per MD, patient is not medically cleared for discharge.    Per MD, the anticipated discharge disposition is Home with O/P Wound Care.    YESYW spoke with Brina,  at [facility] [phone].  Per Caryn's request, LSW faxed clinical documentation to FAX # [phone].     Appointment Type: Outpatient Wound Care  Appointment Location: [facility]  Provider: Dr. Russell  Date: May 4, 2023  Time: 2:00pm    Escalations Completed: None    Medically Clear: Yes    Next Steps: Awaiting Medical Clearance.    Barriers to Discharge: Medical clearance    Is the patient up for discharge tomorrow:

## 2023-04-27 NOTE — PROGRESS NOTES
Assumed care at 1900. Pt A&Ox4. Room air. Ambulates independently. Cath site to right groin soft, dressing CDI. Pulses on right leg dopplerable. Pt reports increased sensation to right foot after procedure. Oxycodone given for pain. Continued IV antibiotics. Pt c/o diarrhea, 3 times this shift. Imodium given. Call light within reach.

## 2023-04-27 NOTE — PROGRESS NOTES
[facility] Medicine Daily Progress Note    Date of Service  4/27/2023    Chief Complaint  Lorene Woodard is a 52 y.o. female admitted 4/22/2023 with foot infection    Hospital Course  51 yo woman with history of CAD, immunocompromise state due to psoriasis on biologic, hypertension, hyperlipidemia, COPD, tobacco abuse who went to [place] ER with right foot swelling and redness after box falling onto the foot and she developed a poor healing wound in Jan 2023. She was admitted to [facility] on 4/20 for gangrenous right foot/cellulitis, acute panniculitis with cellulitis of lower abdominal wall. She was admitted to medicine service and was treated with Unasyn and vancomycin.  Patient also noted to have gram-positive cocci in blood cultures which grew group A strep antibiotics were transitioned to Unasyn ID was consulted recommended 2-week course of antibiotics to complete with oral Zyvox on discharge. She had CTA that showed occluded right iliac artery with collateral flow, BENI concerning for stenosis. Dr. Kaplan was consulted and recommended transfer to [facility] for potential vascular intervention.  Patient underwent successful placement of the right common iliac artery on 4/26/2023.    Interval Problem Update  Patient seen and examined at bedside, she is  POD #2 rt iliac stent placement with vascular surgery.  Continues to have pain in the right foot but is improved with medication changes yesterday she continues to endorse increased sensory in the right foot as well.    -Overnight patient developed tachycardia with heart rates up to 160s, occurred while sleeping patient was asymptomatic.  I personally reviewed EKG which shows sinus tachycardia with a heart rate of 122 there are no ischemic changes noted.  She was placed on telemetry monitoring overnight, heart rate ranging from  brief runs up to 166 unsustained.  Patient is on metoprolol at home and it has been held, I have resumed this today  -Patient was  having diarrhea yesterday several episodes, improved today  -Continue pain control  -Outpatient wound care appointment has been set up for May 4 at 2:00  -Patient is concerned about her heart rate in addition to having good pain control at home given this we will continue to monitor for additional 24 hours with plan to discharge tomorrow morning as long she remains clinically stable.   -I have ordered walking boot  -I have discussed with infectious disease Dr. Dewitt , IV unasyn changed to oral linezolid, end date of 5/6  - I discussed with vascular surgery they have cleared her for discharge        I have discussed this patient's plan of care and discharge plan at IDT rounds today with Case Management, Nursing, Nursing leadership, and other members of the IDT team.    Consultants/Specialty  infectious disease and vascular surgery    Code Status  Full Code    Disposition  Patient is not medically cleared for discharge.   Anticipate discharge to to home with close outpatient follow-up.  I have placed the appropriate orders for post-discharge needs.    Review of Systems  Review of Systems   Constitutional:  Positive for malaise/fatigue.   Respiratory:  Negative for shortness of breath.    Cardiovascular:  Positive for leg swelling. Negative for chest pain.   Gastrointestinal:  Positive for diarrhea (Improved). Negative for abdominal pain, nausea and vomiting.   Genitourinary:  Negative for dysuria.   Musculoskeletal:  Positive for myalgias.   Skin:  Positive for rash.   Neurological:  Negative for weakness.      Physical Exam  Temp:  [36.3 °C (97.3 °F)-37.2 °C (99 °F)] 36.3 °C (97.3 °F)  Pulse:  [] 98  Resp:  [15-16] 16  BP: (113-147)/(64-78) 113/72  SpO2:  [90 %-100 %] 92 %    Physical Exam  Vitals and nursing note reviewed.   Constitutional:       Appearance: She is not toxic-appearing or diaphoretic.   HENT:      Head: Normocephalic.      Mouth/Throat:      Mouth: Mucous membranes are moist.   Eyes:       General:         Right eye: No discharge.         Left eye: No discharge.   Cardiovascular:      Rate and Rhythm: Normal rate and regular rhythm.   Pulmonary:      Effort: Pulmonary effort is normal. No respiratory distress.      Breath sounds: No wheezing or rales.   Abdominal:      General: There is no distension.      Palpations: Abdomen is soft.      Tenderness: There is no abdominal tenderness.   Musculoskeletal:      Cervical back: Neck supple.      Right lower leg: Edema present.      Left lower leg: Edema present.      Comments: Dressing to right groin, CDI   Skin:     General: Skin is warm and dry.      Comments: Right foot, first and second toe with necrosis, no drainage seen, erythema to the right hallux and second toe as well as some erythema on the dorsum, slightly more today likely secondary to reperfusion  Small area of ulceration to abdomen, mild clear drainage, minimal erythema  Scaling plaques to left lower leg   Neurological:      Mental Status: She is alert and oriented to person, place, and time.       Fluids  No intake or output data in the 24 hours ending 04/27/23 1408      Laboratory  Recent Labs     04/25/23  0310 04/26/23  0233   WBC 6.2 7.4   RBC 3.98* 4.09*   HEMOGLOBIN 11.1* 11.4*   HEMATOCRIT 33.6* 35.1*   MCV 84.4 85.8   MCH 27.9 27.9   MCHC 33.0* 32.5*   RDW 43.2 43.0   PLATELETCT 248 276   MPV 9.1 9.2     Recent Labs     04/25/23  0310 04/26/23  0233 04/27/23  1146   SODIUM 142 140 140   POTASSIUM 3.3* 3.3* 3.3*   CHLORIDE 106 103 104   CO2 25 24 24   GLUCOSE 93 110* 94   BUN 8 7* 9   CREATININE 0.87 0.72 0.84   CALCIUM 8.4* 8.6 8.6                       Imaging  EC-ECHOCARDIOGRAM COMPLETE W/ CONT   Final Result      DX-CHEST-LIMITED (1 VIEW)   Final Result      No evidence of acute cardiopulmonary process.      IR-EXTREMITY ANGIOGRAM-UNILATERAL RIGHT    (Results Pending)        Assessment/Plan  * Iliac artery stenosis, right (HCC)- (present on admission)  Assessment & Plan  Noted on  CTA  Transferred from [facility] to [facility] for higher level of care  VTE ppx with heparin SQ  Status post stenting to the right iliac artery by vascular surgery, discussed with surgery she had excellent flow postprocedure  Patient started on aspirin and Plavix  Outpatient vascular follow-up    Tachycardia  Assessment & Plan  Patient developed asymptomatic tachycardia overnight heart rates sustained in 130s patient was asymptomatic and was sleeping at the time  Was having diarrhea yesterday, possible dehydration versus rebound tachycardia as her home beta-blocker had been held?  Resume metoprolol  We will monitor for additional 24 hours prior to discharge    Chronic pain syndrome  Assessment & Plan  Continue home pain meds -- MS contin, Oxycodone, Lyrica, Zanaflex  Patient will Tylenol and ibuprofen ordered for her pain  Patient is having increased foot pain after stent placement likely from reperfusion I have increased her oxycodone    Nonhealing surgical wound  Assessment & Plan  Wound care is following appreciate their help  Outpatient wound appointment made to follow toe necrosis     Chronic acquired lymphedema  Assessment & Plan  Continue home lasix    Neuropathy  Assessment & Plan  Chronic, stable continue Lyrica    Insomnia  Assessment & Plan  PRN trazodone    Class 1 obesity in adult  Assessment & Plan  Diet and lifestyle modification  Body mass index is 34.59 kg/m².      Claudication of right lower extremity (HCC)  Assessment & Plan  Abnormal BENI noted on arterial duplex  ASA/statin  Vascular surgery was consulted, stent placed today 426 with good flow  Patient started on aspirin and Plavix      Cellulitis  Assessment & Plan  Of right foot and abdominal wall  Improving IV Unasyn has been changed to oral linezolid with an end date of 5/6/2023 after discussion with ID    Gangrene (HCC)  Assessment & Plan  Right foot/toes gangre  Has underlying PAD/PVD, tobacco abuse  Foot wound culture is growing GAS,  surgery and infectious disease following  Continue Unasyn plan for total 2-week course, changed to Zyvox on discharge to complete course  Status post iliac stent by vascular surgery today with good flow, started on aspirin and Plavix  No surgical plan for necrosis on the distal toes, patient would benefit from outpatient follow-up with wound care I have placed a referral and discussed with inpatient wound care who are in agreement  Outpatient wound care follow-up appointment has been made for May 4 at 2:00    Acute panniculitis  Assessment & Plan  Abdominal wall cellulitis/panniculitis, area appears to be healing on exam  Unasyn transition to Zyvox  Abdominal wound cultures so far no growth  Doing well outpatient wound referral has been placed, first appointment is May 4, 2023    Immunocompromised state (HCC)  Assessment & Plan  Due to biologic treatment for psoriasis, hold during infection  Continue antibiotic care, ID is following appreciate their help  IV Unasyn to linezolid, end date 5/6/2023    Bacteremia  Assessment & Plan  [place] 4/20 growing GAS  Repeat blood cultures so far no growth  TTE negative for endocarditis  I discussed with ID Dr. Dewitt, discontinue Unasyn and start oral Zyvox to complete total week course, end date 5/6/2023     Psoriatic arthritis (HCC)- (present on admission)  Assessment & Plan  On biologic agent with Enbrel, hold during infection  Followed by Rheumatology outpt  Will need outpatient follow-up to determine when this medication is safe to resume    Tobacco use- (present on admission)  Assessment & Plan  Received smoking cessation counseling  Nicotine replacement has been ordered    Essential hypertension, benign- (present on admission)  Assessment & Plan  Bradycardic, metoprolol held  She is hypertensive  Continue Lasix, increase amlodipine from 2.5 to 5 mg  Continue to monitor continue current medication plan    CAD (coronary artery disease)- (present on admission)  Assessment  & Plan  Stable, continue aspirin and statin         VTE prophylaxis: heparin ppx    I have performed a physical exam and reviewed and updated ROS and Plan today (4/27/2023). In review of yesterday's note (4/26/2023), there are no changes except as documented above.

## 2023-04-27 NOTE — PROGRESS NOTES
Pt HR with VS at 2316 was 135. BP: 127/67. Recheck of apical HR was 134. Pt asymptomatic. C/o foot pain. Oxycodone given. MAURY Loera notified. Orders for remote tele and EKG. Tele monitor placed.    Left:/abd/pelvis

## 2023-04-27 NOTE — CARE PLAN
The patient is Watcher - Medium risk of patient condition declining or worsening    Shift Goals  Clinical Goals: pain control, ambulate, monitor circulation  Patient Goals: pain control    Progress made toward(s) clinical / shift goals:      Problem: Knowledge Deficit - Standard  Goal: Patient and family/care givers will demonstrate understanding of plan of care, disease process/condition, diagnostic tests and medications  Outcome: Progressing     Problem: Pain - Standard  Goal: Alleviation of pain or a reduction in pain to the patient’s comfort goal  Outcome: Progressing   Pt c/o right foot pain. PRN oxycodone given and pt able to rest and ambulate at a comfortable level.    Patient is not progressing towards the following goals:

## 2023-04-27 NOTE — PROGRESS NOTES
Infectious Disease Progress Note    Author: Breanne Dewitt M.D. Date & Time of service: 2023  8:15 AM    Chief Complaint:  Follow-up for group A strep bacteremia, right toe necrosis and cellulitis    Interval History:   patient afebrile, WBC 6.2, creatinine 0.87.  Foot wound culture also growing group A streptococcus.  blood cultures negative to date.  Patient doing well without any new complaints.   patient afebrile, WBC 7.4.  Creatinine 0.72.  Patient just returned from surgery.  She now feels circulation down to her right foot after stent placement this morning   Tmax 99 no labs available this morning.  Patient feeling well.  She feels circulation down to her right foot.  Diarrhea also improved    Labs Reviewed, Medications Reviewed, and Wound Reviewed.    Review of Systems:  Review of Systems   Constitutional:  Negative for chills and fever.   Respiratory:  Negative for cough and shortness of breath.    Gastrointestinal:  Positive for diarrhea. Negative for abdominal pain, nausea and vomiting.        Improved     Hemodynamics:  Temp (24hrs), Av.9 °C (98.4 °F), Min:36.6 °C (97.9 °F), Max:37.2 °C (99 °F)  Temperature: 36.7 °C (98.1 °F)  Pulse  Av.5  Min: 49  Max: 135   Blood Pressure: 132/68       Physical Exam:  Physical Exam  Vitals and nursing note reviewed.   Constitutional:       Appearance: Normal appearance. She is obese.   HENT:      Mouth/Throat:      Mouth: Mucous membranes are moist.   Eyes:      Extraocular Movements: Extraocular movements intact.      Pupils: Pupils are equal, round, and reactive to light.   Cardiovascular:      Rate and Rhythm: Normal rate.   Pulmonary:      Effort: Pulmonary effort is normal. No respiratory distress.      Breath sounds: No wheezing.   Abdominal:      Palpations: Abdomen is soft.      Tenderness: There is no abdominal tenderness.      Comments: Lower abdominal superficial wound-no active drainage or surrounding erythema    Musculoskeletal:      Comments: Right first and second toe necrosis with with resolving edema and erythema.    Bilateral lower extremity psoriatic changes-no signs of active superimposed infection   Skin:     General: Skin is warm.   Neurological:      General: No focal deficit present.      Mental Status: She is alert and oriented to person, place, and time.   Psychiatric:         Mood and Affect: Mood normal.         Behavior: Behavior normal.       Meds:    Current Facility-Administered Medications:     aspirin EC    clopidogrel    oxyCODONE immediate-release **OR** oxyCODONE immediate-release **OR** HYDROmorphone    nicotine **AND** Nicotine Replacement Patient Education Materials **AND** nicotine polacrilex    triamcinolone acetonide    lidocaine    lidocaine **OR** lidocaine    amLODIPine    loperamide    LR    labetalol    ondansetron    ondansetron    promethazine    promethazine    prochlorperazine    guaiFENesin dextromethorphan    heparin    ampicillin-sulbactam (UNASYN) IV    furosemide    atorvastatin    hydrOXYzine HCl    morphine ER    pregabalin    tizanidine    [Held by provider] metoprolol tartrate    Pharmacy Consult Request    acetaminophen **FOLLOWED BY** acetaminophen    ibuprofen **FOLLOWED BY** ibuprofen    Labs:  Recent Labs     04/25/23 0310 04/26/23  0233   WBC 6.2 7.4   RBC 3.98* 4.09*   HEMOGLOBIN 11.1* 11.4*   HEMATOCRIT 33.6* 35.1*   MCV 84.4 85.8   MCH 27.9 27.9   RDW 43.2 43.0   PLATELETCT 248 276   MPV 9.1 9.2   NEUTSPOLYS 60.00 63.90   LYMPHOCYTES 26.80 22.90   MONOCYTES 7.40 7.30   EOSINOPHILS 5.00 5.00   BASOPHILS 0.50 0.40       Recent Labs     04/25/23  0310 04/26/23  0233   SODIUM 142 140   POTASSIUM 3.3* 3.3*   CHLORIDE 106 103   CO2 25 24   GLUCOSE 93 110*   BUN 8 7*       Recent Labs     04/25/23  0310 04/26/23  0233   CREATININE 0.87 0.72         Imaging:  DX-CHEST-LIMITED (1 VIEW)    Result Date: 4/22/2023 4/22/2023 4:35 PM HISTORY/REASON FOR EXAM: Shortness of breath  TECHNIQUE/EXAM DESCRIPTION AND NUMBER OF VIEWS: Single AP view of the chest. COMPARISON: None FINDINGS: There is no evidence of focal infiltrate or pulmonary edema. The heart is normal in size. There is no pleural effusion. Bony structures and soft tissues are unremarkable.     No evidence of acute cardiopulmonary process.    EC-ECHOCARDIOGRAM COMPLETE W/ CONT    Result Date: 2023  Transthoracic Echo Report Echocardiography Laboratory CONCLUSIONS Normal left ventricular systolic function. The left ventricular ejection fraction is visually estimated to be 55- 60%. Normal diastolic function. The right ventricle is not well visualized. Unable to estimate right ventricular systolic pressure due to an inadequate tricuspid regurgitant jet. Grossly normal left atrial size. Dilated inferior vena cava without inspiratory collapse. The cardiac valves are not well visualized in the apical views. No obvious valvular vegetation seen in the parasternal views. If there is a strong clinical concern for endocarditis and findings will change clinical management, recommend further evaluation with transesophageal echocardiogram. STANLEY MARKHAM Exam Date:         2023                    10:22 Exam Location:     Inpatient Priority:          Routine Ordering Physician:        SHAQUILLE DREW Referring Physician: Sonographer:               Felicia OKEEFE Age:    52     Gender:    F MRN:    [id number] :    1970 BSA:    2.11   Ht (in):    67     Wt (lb):    220 Exam Type:     Complete Indications:     Acute/Subacute Bacterial Endocarditis ICD Codes:       421 CPT Codes:       60768 BP:   129    /   75     HR:   60 Technical Quality:       Fair MEASUREMENTS  (Male / Female) Normal Values 2D ECHO LV Diastolic Diameter PLAX        4.4 cm                4.2 - 5.9 / 3.9 - 5.3 cm LV Systolic Diameter PLAX         3.1 cm                2.1 - 4.0 cm IVS Diastolic Thickness           0.9 cm                LVPW Diastolic  Thickness          0.8 cm                LVOT Diameter                     2 cm                  Estimated LV Ejection Fraction    60 %                  LV Ejection Fraction MOD BP       62.1 %                >= 55  % LV Ejection Fraction MOD 4C       68.2 %                LV Ejection Fraction MOD 2C       56.8 %                LV Ejection Fraction 4C AL        68.9 %                LV Ejection Fraction 2C AL        58.5 %                DOPPLER AV Peak Velocity                  1.2 m/s               AV Peak Gradient                  5.7 mmHg              AV Mean Gradient                  2 mmHg                LVOT Peak Velocity                0.88 m/s              AV Area Cont Eq vti               2.9 cm2               Mitral E Point Velocity           1 m/s                 Mitral E to A Ratio               1.8                   MV Pressure Half Time             68 ms                 MV Area PHT                       3.2 cm2               MV Deceleration Time              233 ms                PV Peak Velocity                  0.94 m/s              PV Peak Gradient                  3.5 mmHg              LV E' Lateral Velocity            14.4 cm/s             Mitral E to LV E' Lateral Ratio   6.9                   LV E' Septal Velocity             12.1 cm/s             Mitral E to LV E' Septal Ratio    8.2                   * Indicates values subject to auto-interpretation LV EF:  60    % FINDINGS Left Ventricle The left ventricle is normal in size and thickness. Normal left ventricular wall thickness. Normal left ventricular systolic function. The left ventricular ejection fraction is visually estimated to be 55- 60%. Normal regional wall motion. Normal diastolic function. Right Ventricle The right ventricle is not well visualized. Right Atrium The right atrium is not well visualized.  Dilated inferior vena cava without inspiratory collapse. Left Atrium Grossly normal left atrial size. Mitral Valve Structurally  normal mitral valve in the parasternal view without significant stenosis. Trace mitral regurgitation. Aortic Valve Structurally normal aortic valve in the parasternal view without significant stenosis or regurgitation. Tricuspid Valve Structurally normal tricuspid valve in the parasternal view without significant stenosis or regurgitation. Unable to estimate right ventricular systolic pressure due to an inadequate tricuspid regurgitant jet. Pulmonic Valve The pulmonic valve is not well visualized. No significant stenosis and trace pulmonic insufficiency by Doppler evaluation. Pericardium No pericardial effusion. Aorta Normal aortic root for body surface area. The ascending aorta diameter is 2.7 cm. Trell Nicholson MD (Electronically Signed) Final Date:     23 April 2023                 15:20      Micro:  Results       Procedure Component Value Units Date/Time    Culture Wound W/ Gram Stain [[id number]] Collected: 04/22/23 2158    Order Status: Completed Specimen: Wound from Abdominal Updated: 04/26/23 1246     Significant Indicator NEG     Source WND     Site ABDOMINAL     Culture Result No growth at 72 hours.     Gram Stain Result Many WBCs.  No organisms seen.      Narrative:      Collected By: [id number] PEG FORDE  ABD (abdominal)    CULTURE WOUND W/ GRAM STAIN [[id number]]  (Abnormal) Collected: 04/22/23 2158    Order Status: Completed Specimen: Wound from Abdominal Updated: 04/25/23 1048     Significant Indicator POS     Source WND     Site Foot     Culture Result -     Gram Stain Result No organisms seen.     Culture Result Streptococcus pyogenes (Group A)  Light growth      Narrative:      CALL  [place]  141 tel. [phone],  CALLED  141 tel. [phone] 04/24/2023, 12:56, RB PERF. RESULTS CALLED  TO:Ashley Golden RN [GRPA Strep]  Collected By: [id number] PEG FORDE  Foot    Urine Culture [[id number]] Collected: 04/23/23 0100    Order Status: Completed Specimen: Urine, Clean Catch Updated: 04/25/23 0707      Significant Indicator NEG     Source UR     Site URINE, CLEAN CATCH     Culture Result Usual urogenital ct ,000 cfu/mL    Narrative:      Collected By: [id number] CUSHING DORAN S  If not done within the last 24 hours  Collected By: [id number] CUSHING MILTON S  Indication for culture:->Evaluation for sepsis without a  clear source of infection  Collected By: [id number] CUSHING DORAN S    Cdiff By PCR Rflx Toxin [[id number]] Collected: 04/23/23 1430    Order Status: Completed Updated: 04/23/23 1543     C Diff by PCR Negative     Comment: C. difficile NOT detected by PCR.  Treatment not indicated per guidelines.  Repeat testing not indicated within 7 days.          027-NAP1-BI Presumptive Negative     Comment: Presumptive 027/NAP1/BI target DNA sequences are NOT DETECTED.       C Diff by PCR rflx Toxin [[id number]]     Order Status: Canceled Specimen: Stool     Blood Culture [[id number]] Collected: 04/22/23 1806    Order Status: Completed Specimen: Blood Updated: 04/23/23 0729     Significant Indicator NEG     Source BLD     Site Peripheral     Culture Result No Growth  Note: Blood cultures are incubated for 5 days and  are monitored continuously.Positive blood cultures  are called to the RN and reported as soon as  they are identified.      Narrative:      Pt is on antibiotics, results may be compromised.  04/22/2023  18:52  Left Forearm/Arm    Blood Culture [[id number]] Collected: 04/22/23 1806    Order Status: Completed Specimen: Blood Updated: 04/23/23 0729     Significant Indicator NEG     Source BLD     Site Peripheral     Culture Result No Growth  Note: Blood cultures are incubated for 5 days and  are monitored continuously.Positive blood cultures  are called to the RN and reported as soon as  they are identified.      Narrative:      Previous comment was modified by SHERIN at 19:05 on 04/22/23.  Pt is on antibiotics, results may be compromised.  04/22/2023  18:55  ?Ordered form hard req #[id number]  ?On req it also  "notes \" Antiobiotics administeres, was paused when  ?drawn\"  ? 04/22/2023  18:57  Pt is on antibiotics, results may be compromised.  04/22/2023  18:55  Left Forearm/Arm    GRAM STAIN [[id number]] Collected: 04/22/23 2158    Order Status: Completed Specimen: Wound Updated: 04/23/23 0515     Significant Indicator .     Source WND     Site Foot     Gram Stain Result No organisms seen.    Narrative:      Collected By: [id number] PEG FORDE  Foot    GRAM STAIN [[id number]] Collected: 04/22/23 2158    Order Status: Completed Specimen: Wound Updated: 04/23/23 0515     Significant Indicator .     Source WND     Site ABDOMINAL     Gram Stain Result Many WBCs.  No organisms seen.      Narrative:      Collected By: [id number] PEG FORDE  ABD (abdominal)    Urinalysis [[id number]] Collected: 04/23/23 0100    Order Status: Completed Specimen: Urine, Clean Catch Updated: 04/23/23 0136     Color Yellow     Character Clear     Specific Gravity 1.008     Ph 7.0     Glucose Negative mg/dL      Ketones Negative mg/dL      Protein Negative mg/dL      Bilirubin Negative     Urobilinogen, Urine 0.2     Nitrite Negative     Leukocyte Esterase Negative     Occult Blood Negative     Micro Urine Req see below     Comment: Microscopic examination not performed when specimen is clear  and chemically negative for protein, blood, leukocyte esterase  and nitrite.         Narrative:      Collected By: [id number] CUSHING DORAN S  If not done within the last 24 hours  Collected By: [id number] CUSHING MILTON S  Indication for culture:->Evaluation for sepsis without a  clear source of infection    S. Aureus By PCR, Nasal Complete [[id number]] Collected: 04/22/23 2144    Order Status: Completed Specimen: Respirate from Respiratory Updated: 04/22/23 2310     Staph aureus by PCR Negative     MRSA by PCR Negative    Narrative:      Collected By: [id number] PEG FORDE    Blood Culture [[id number]]     Order Status: No result Specimen: Blood from Peripheral     " Blood Culture [[id number]]     Order Status: No result Specimen: Blood from Peripheral             Assessment:  52 y.o. woman with a history of COPD, tobacco dependence, hypertension, and peripheral arterial disease admitted on 4/22/2023 from [facility] where she presented with worsening right foot swelling and erythema.  Patient found to have right foot cellulitis with necrotic first and second toe and CTA findings revealing an occluded right iliac artery.  She was also found to have positive blood cultures on 4/20 at the outside hospital with group A streptococcus.  Blood cultures here on 4/22 are negative to date.    Pertinent diagnoses:  1.  Group A strep bacteremia   2.  Right great toe and second toe necrosis  3.  Right foot cellulitis  4.  Peripheral arterial disease  5.  COPD  6.  Tobacco dependence  7.  Hypertension  8.  Psoriasis on Cosentyx  9.  Immunosuppressed state  10.  Abdominal wound  11.  Diarrhea, C. difficile PCR negative    Plan:  - Discontinue IV Unasyn 3 g every 6 hours  -Start oral linezolid 600 mg twice a day  -Monitor CBC while on linezolid  - Status post right lower extremity angiogram with stent placement in the right common iliac artery on 4/26/2023  -Blood culture on 4/22-negative to date  -Anticipate a 14-day course of antibiotics for treatment of bacteremia and right foot infection with stop date of 05/06/2023  -Please ensure patient can obtain linezolid prior to discharge as this may require prior authorization  -Advised tobacco cessation  -Monitor right lower extremity after procedure     Disposition: Home.  Patient lives in [place]     Midline: No, oral antibiotic options available    No ID clinic follow-up needed     I have performed a physical exam and reviewed and updated ROS and plan today 4/27/2023.  In review of yesterday's note 4/26/2023, there are no changes except as documented above.    Discussed with internal medicine/Dr. Miles.  ID signing off

## 2023-04-27 NOTE — PROGRESS NOTES
Assumed care of patient at 0645. Bedside report received. Assessment complete.  AA&Ox4. Denies CP/SOB.  Reporting 1/10 pain. Medicated per MAR.  Telemonitor Room Verbalized Visualization of patient Heart Rhythm   Educated patient regarding pharmacologic and non pharmacologic modalities for pain management.  Skin per flowsheets  Tolerating Regular diet. Denies N/V.  + void. Last BM 4/26  Pt ambulates independently, No assistance required.  All needs met at this time. Call light within reach. Pt calls appropriately. Bed low and locked, non skid socks in place. Hourly rounding in place.

## 2023-04-28 ENCOUNTER — PHARMACY VISIT (OUTPATIENT)
Dept: PHARMACY | Facility: MEDICAL CENTER | Age: 53
End: 2023-04-28
Payer: COMMERCIAL

## 2023-04-28 VITALS
HEART RATE: 82 BPM | OXYGEN SATURATION: 91 % | BODY MASS INDEX: 34.67 KG/M2 | SYSTOLIC BLOOD PRESSURE: 117 MMHG | HEIGHT: 67 IN | DIASTOLIC BLOOD PRESSURE: 67 MMHG | WEIGHT: 220.9 LBS | TEMPERATURE: 97.7 F | RESPIRATION RATE: 16 BRPM

## 2023-04-28 LAB
ANION GAP SERPL CALC-SCNC: 10 MMOL/L (ref 7–16)
BUN SERPL-MCNC: 11 MG/DL (ref 8–22)
CALCIUM SERPL-MCNC: 8.6 MG/DL (ref 8.5–10.5)
CHLORIDE SERPL-SCNC: 104 MMOL/L (ref 96–112)
CO2 SERPL-SCNC: 22 MMOL/L (ref 20–33)
CREAT SERPL-MCNC: 0.73 MG/DL (ref 0.5–1.4)
GFR SERPLBLD CREATININE-BSD FMLA CKD-EPI: 99 ML/MIN/1.73 M 2
GLUCOSE SERPL-MCNC: 111 MG/DL (ref 65–99)
MAGNESIUM SERPL-MCNC: 1.8 MG/DL (ref 1.5–2.5)
POTASSIUM SERPL-SCNC: 3.5 MMOL/L (ref 3.6–5.5)
SODIUM SERPL-SCNC: 136 MMOL/L (ref 135–145)

## 2023-04-28 PROCEDURE — 80048 BASIC METABOLIC PNL TOTAL CA: CPT

## 2023-04-28 PROCEDURE — A9270 NON-COVERED ITEM OR SERVICE: HCPCS | Performed by: NURSE PRACTITIONER

## 2023-04-28 PROCEDURE — A9270 NON-COVERED ITEM OR SERVICE: HCPCS | Performed by: INTERNAL MEDICINE

## 2023-04-28 PROCEDURE — 700102 HCHG RX REV CODE 250 W/ 637 OVERRIDE(OP): Performed by: STUDENT IN AN ORGANIZED HEALTH CARE EDUCATION/TRAINING PROGRAM

## 2023-04-28 PROCEDURE — 700102 HCHG RX REV CODE 250 W/ 637 OVERRIDE(OP): Performed by: SURGERY

## 2023-04-28 PROCEDURE — A9270 NON-COVERED ITEM OR SERVICE: HCPCS | Performed by: SURGERY

## 2023-04-28 PROCEDURE — A9270 NON-COVERED ITEM OR SERVICE: HCPCS | Performed by: STUDENT IN AN ORGANIZED HEALTH CARE EDUCATION/TRAINING PROGRAM

## 2023-04-28 PROCEDURE — 83735 ASSAY OF MAGNESIUM: CPT

## 2023-04-28 PROCEDURE — 700102 HCHG RX REV CODE 250 W/ 637 OVERRIDE(OP): Performed by: NURSE PRACTITIONER

## 2023-04-28 PROCEDURE — RXMED WILLOW AMBULATORY MEDICATION CHARGE: Performed by: STUDENT IN AN ORGANIZED HEALTH CARE EDUCATION/TRAINING PROGRAM

## 2023-04-28 PROCEDURE — 700111 HCHG RX REV CODE 636 W/ 250 OVERRIDE (IP): Performed by: STUDENT IN AN ORGANIZED HEALTH CARE EDUCATION/TRAINING PROGRAM

## 2023-04-28 PROCEDURE — 700102 HCHG RX REV CODE 250 W/ 637 OVERRIDE(OP): Performed by: INTERNAL MEDICINE

## 2023-04-28 PROCEDURE — 99239 HOSP IP/OBS DSCHRG MGMT >30: CPT | Performed by: STUDENT IN AN ORGANIZED HEALTH CARE EDUCATION/TRAINING PROGRAM

## 2023-04-28 RX ORDER — POTASSIUM CHLORIDE 20 MEQ/1
20 TABLET, EXTENDED RELEASE ORAL DAILY
Qty: 30 TABLET | Refills: 1 | Status: SHIPPED | OUTPATIENT
Start: 2023-04-28

## 2023-04-28 RX ORDER — ATORVASTATIN CALCIUM 20 MG/1
40 TABLET, FILM COATED ORAL DAILY
Qty: 30 TABLET | Refills: 3 | Status: SHIPPED | OUTPATIENT
Start: 2023-04-28

## 2023-04-28 RX ORDER — POTASSIUM CHLORIDE 20 MEQ/1
40 TABLET, EXTENDED RELEASE ORAL ONCE
Status: COMPLETED | OUTPATIENT
Start: 2023-04-28 | End: 2023-04-28

## 2023-04-28 RX ORDER — LINEZOLID 600 MG/1
600 TABLET, FILM COATED ORAL EVERY 12 HOURS
Qty: 20 TABLET | Refills: 0 | Status: ACTIVE | OUTPATIENT
Start: 2023-04-28 | End: 2023-06-06

## 2023-04-28 RX ORDER — FUROSEMIDE 40 MG/1
40 TABLET ORAL DAILY
Qty: 30 TABLET | Refills: 1 | Status: SHIPPED | OUTPATIENT
Start: 2023-04-28

## 2023-04-28 RX ORDER — AMLODIPINE BESYLATE 5 MG/1
5 TABLET ORAL DAILY
Qty: 30 TABLET | Refills: 1 | Status: SHIPPED | OUTPATIENT
Start: 2023-04-29 | End: 2023-06-23

## 2023-04-28 RX ORDER — CLOPIDOGREL BISULFATE 75 MG/1
75 TABLET ORAL DAILY
Qty: 30 TABLET | Refills: 3 | Status: SHIPPED | OUTPATIENT
Start: 2023-04-29

## 2023-04-28 RX ORDER — ACETAMINOPHEN 500 MG
1000 TABLET ORAL EVERY 6 HOURS PRN
Qty: 30 TABLET | Refills: 0 | COMMUNITY
Start: 2023-04-28 | End: 2023-06-06

## 2023-04-28 RX ORDER — NICOTINE 21 MG/24HR
1 PATCH, TRANSDERMAL 24 HOURS TRANSDERMAL EVERY 24 HOURS
Qty: 30 PATCH | Refills: 0 | Status: SHIPPED | OUTPATIENT
Start: 2023-04-28 | End: 2023-06-06

## 2023-04-28 RX ADMIN — FUROSEMIDE 40 MG: 20 TABLET ORAL at 04:26

## 2023-04-28 RX ADMIN — CLOPIDOGREL BISULFATE 75 MG: 75 TABLET ORAL at 04:26

## 2023-04-28 RX ADMIN — HEPARIN SODIUM 5000 UNITS: 5000 INJECTION, SOLUTION INTRAVENOUS; SUBCUTANEOUS at 04:29

## 2023-04-28 RX ADMIN — MORPHINE SULFATE 30 MG: 30 TABLET, FILM COATED, EXTENDED RELEASE ORAL at 04:25

## 2023-04-28 RX ADMIN — NICOTINE TRANSDERMAL SYSTEM 21 MG: 21 PATCH, EXTENDED RELEASE TRANSDERMAL at 04:27

## 2023-04-28 RX ADMIN — PREGABALIN 300 MG: 150 CAPSULE ORAL at 04:26

## 2023-04-28 RX ADMIN — LINEZOLID 600 MG: 600 TABLET, FILM COATED ORAL at 04:25

## 2023-04-28 RX ADMIN — AMLODIPINE BESYLATE 5 MG: 10 TABLET ORAL at 04:26

## 2023-04-28 RX ADMIN — ACETAMINOPHEN 1000 MG: 500 TABLET, FILM COATED ORAL at 02:42

## 2023-04-28 RX ADMIN — POTASSIUM CHLORIDE 40 MEQ: 1500 TABLET, EXTENDED RELEASE ORAL at 08:53

## 2023-04-28 RX ADMIN — METOPROLOL TARTRATE 50 MG: 50 TABLET, FILM COATED ORAL at 04:27

## 2023-04-28 RX ADMIN — ASPIRIN 81 MG: 81 TABLET, COATED ORAL at 04:26

## 2023-04-28 RX ADMIN — TRIAMCINOLONE ACETONIDE: 1 CREAM TOPICAL at 04:31

## 2023-04-28 RX ADMIN — OXYCODONE HYDROCHLORIDE 10 MG: 10 TABLET ORAL at 04:24

## 2023-04-28 ASSESSMENT — PAIN DESCRIPTION - PAIN TYPE
TYPE: ACUTE PAIN

## 2023-04-28 NOTE — CARE PLAN
The patient is Stable - Low risk of patient condition declining or worsening    Shift Goals  Clinical Goals: Pain control, VS to remain stable  Patient Goals: Pain control, rest  Family Goals: WILDER    Progress made toward(s) clinical / shift goals:   Problem: Knowledge Deficit - Standard  Goal: Patient and family/care givers will demonstrate understanding of plan of care, disease process/condition, diagnostic tests and medications  Outcome: Progressing  Note: Pt updated on POC,pt verbalized understanding. Questions answered.       Problem: Pain - Standard  Goal: Alleviation of pain or a reduction in pain to the patient’s comfort goal  Outcome: Progressing  Note: Pain assessed Q2h. Pain medication given per orders, see MAR.

## 2023-04-28 NOTE — DISCHARGE PLANNING
Meds-to-Beds: Discharge prescription order listed below delivered to patient's bedside. RN Naeem notified. Patient counseled. Patient elected to have payment billed to patient account (pt aware not covered and ok with price).     Current Outpatient Medications   Medication Sig Dispense Refill    linezolid (ZYVOX) 600 MG Tab Take 1 Tablet by mouth every 12 hours. 20 Tablet 0      Brenna Ricks, PharmD

## 2023-04-28 NOTE — PROGRESS NOTES
Assumed care of patient at 0645. Bedside report received. Assessment complete.  AA&Ox4. Denies CP/SOB.  Telemonitor Room Verbalized Visualization of Patient Heart Rhythm   Reporting 0/10 pain. Declined intervention at this time.  Educated patient regarding pharmacologic and non pharmacologic modalities for pain management.  Skin per flowsheets  Tolerating Regular diet. Denies N/V.  + void. Last BM 4/27  Pt ambulates independently.  All needs met at this time. Call light within reach. Pt calls appropriately. Bed low and locked, non skid socks in place. Hourly rounding in place.

## 2023-04-28 NOTE — PROGRESS NOTES
Patient to DC sean, x7 Silicone Adhesive Foam/ x7 Petrolateum Gauze sent home with patient, instructed patient how to change dressing, frequency of dressing changes    X1 4 Oz Betadine Bottle sent home with patient, Patient verbalized understanding of Betadine Application to Necrotic Toes     Orthopaedic Boot at bedside per MD order, Patient to be sent home with Boot

## 2023-04-28 NOTE — CARE PLAN
Problem: Knowledge Deficit - Standard  Goal: Patient and family/care givers will demonstrate understanding of plan of care, disease process/condition, diagnostic tests and medications  Outcome: Progressing     Problem: Fluid Volume  Goal: Fluid volume balance will be maintained  Outcome: Progressing     Problem: Urinary - Renal Perfusion  Goal: Ability to achieve and maintain adequate renal perfusion and functioning will improve  Outcome: Progressing   The patient is Stable - Low risk of patient condition declining or worsening    Shift Goals  Clinical Goals: Dressing Changes  Patient Goals: Discharge  Family Goals: N/A    Progress made toward(s) clinical / shift goals: R [place] Dressing Change performed by Previous RN, Toes Painted with Betadine this shift, Patient to [facility], Educated patient on plan of care this shift, Urine Output Per flowsheets     Patient is not progressing towards the following goals:

## 2023-04-28 NOTE — PROGRESS NOTES
Report received, assumed care at 1900. Pt is A&Ox4. VSS. Denies pain. Up self.  Changed dressing to RLQ, now CDI.  Telemonitor in place.  Pt updated on POC, needs met and questions answered.  Calls appropriately.  Call light within reach and working properly.

## 2023-04-28 NOTE — DISCHARGE INSTRUCTIONS
Take medications as directed, review your medication list as new medications have been added and others have been changed   If you choose to take ibuprofen for pain control, take with food and hold your celebrex, you can resume celebrex once you are no longer requiring ibuprofen for pain control   Follow up with your dermatologist prior to resuming her biologic for psoriasis   Follow up vascular surgeon, their information is below to schedule   It is imperative that you continue both aspirin and plavix, these medications keep the stent that was placed open. Do not stop unless your directed to by a doctor. If you have any bleeding issues, seek medical advice and/or attention   Wound care appointment has been set up for May 4th at 2:00 to monitor your toes  If you develop fevers, chills, increased redness, swelling or drainage seek medical attention   It is imperative that you stop smoking, nicotine patches were sent to your pharmacy, discuss with your primary care doctor further if you need help to quit as there are other medication options that can help     Your wound care appointment is at [facility], [place], operated by [facility] [phone].    Appointment Type: Outpatient Wound Care  Appointment Location: [facility], [place], [facility]  Provider: Dr. Russell  Date: May 4, 2023  Time: 2:00pm

## 2023-04-28 NOTE — DISCHARGE SUMMARY
Discharge Summary    CHIEF COMPLAINT ON ADMISSION  No chief complaint on file.      Reason for Admission  Occluded iliac vein     Admission Date  4/22/2023    CODE STATUS  Full Code    HPI & HOSPITAL COURSE  This is a very pleasant 52 y.o. female with a past medical history of coronary artery disease, immunocompromise state due to psoriasis on biologic, hypertension, hyperlipidemia, COPD not on chronic oxygen, and tobacco abuse who initially presented to [place] ER with right foot swelling and redness after box fell onto the foot and she developed a poor healing wound in Jan 2023. She was admitted to [facility] on 4/20 for gangrenous right foot/cellulitis as well as acute panniculitis with cellulitis of lower abdominal wall.  She was transferred to [facility] for further surgical evaluation and for higher level of care.  At outside facility she had blood cultures drawn which did return positive for group A streptococcus.  She initially been started on Unasyn and vancomycin which was transitioned to Unasyn monotherapy per infectious disease recommendations.  This was transition later to Zyvox upon discharge for total 2-week course.  Her repeat blood cultures here have been negative to date.  Given the necrosis on the distal right hallux and second toe she underwent CTA at outside facility which showed right iliac stenosis.  Vascular surgery was consulted and patient did ultimately undergo successful right common iliac artery stent and was placed on dual antiplatelet therapy with aspirin plus Plavix.  She did have some improvement in the toe necrosis however this will need to be monitored.  She was set up with outpatient wound care follow-up for close monitoring.  She was educated on importance of smoking cessation.  During admission she did develop intermittent tachycardia with heart rates up to 160s EKG did show sinus tachycardia she was resumed on her home metoprolol with improvement in her heart rate.  At the time of  admission patient is hemodynamically stable and comfortable discharge plan.  She will continue with her chronic pain management and will follow-up with her chronic pain doctor for further care given that she does have increased pain secondary to reperfusion/toe cellulitis and necrosis.  She was instructed on importance of completion of antibiotics and smoking cessation.  She was extensively educated on signs and symptoms such as increased swelling, redness or drainage or presence of fevers chills or other concerning symptoms that should prompt her to seek medical attention.      Therefore, she is discharged in fair and stable condition to home with close outpatient follow-up.    The patient met 2-midnight criteria for an inpatient stay at the time of discharge.    Discharge Date  4/28/2023    FOLLOW UP ITEMS POST DISCHARGE  Wound care follow-up to monitor toe necrosis  Follow-up to ensure infection control  Recommend holding Biologics until her infection is cleared, will need to follow-up with her dermatologist to decide when it is safe to resume  Dual antiplatelet therapy, monitor for bleeding  Follow-up on chronic medical conditions including COPD and coronary artery disease as well as hypertension      DISCHARGE DIAGNOSES  Principal Problem:    Iliac artery stenosis, right (HCC) POA: Yes  Active Problems:    CAD (coronary artery disease) POA: Yes      Overview: December 2012: NSTEMI. Coronary angiogram with 60% LAD stenosis, 40%       stenosis proximal LAD, 20% stenosis mid circumflex. Medical therapy       intensified.          Essential hypertension, benign POA: Yes    Tobacco use POA: Yes    Psoriatic arthritis (HCC) POA: Yes    Bacteremia POA: Unknown    Immunocompromised state (HCC) POA: Unknown    Acute panniculitis POA: Unknown    Gangrene (HCC) POA: Unknown    Cellulitis POA: Unknown    Claudication of right lower extremity (HCC) POA: Unknown    Class 1 obesity in adult POA: Unknown    Insomnia POA:  Unknown    Neuropathy POA: Unknown    Chronic acquired lymphedema POA: Unknown    Nonhealing surgical wound POA: Unknown    Chronic pain syndrome POA: Unknown    Tachycardia POA: No  Resolved Problems:    * No resolved hospital problems. *      FOLLOW UP  Future Appointments   Date Time Provider Department Center   8/8/2023  1:20 PM Sissy Castillo M.D. I [facility] DAMIAN Kaplan M.D.  [address]  [address]  [place] [address]  [phone]    Schedule an appointment as soon as possible for a visit in 3 month(s)  My office will reach out to you to set up vascular testing and a follow-up visit in about 3-4 months but you can call us sooner if concerns arise in the meantime    [facility]  [address]  [place] [address]  [phone]          Renato Mays M.D.  [address] [facility] [address]  [phone]    Schedule an appointment as soon as possible for a visit in 1 week(s)  hospital follow up      MEDICATIONS ON DISCHARGE     Medication List        START taking these medications        Instructions   acetaminophen 500 MG Tabs  Commonly known as: TYLENOL   Take 2 Tablets by mouth every 6 hours as needed for Mild Pain or Moderate Pain.  Dose: 1,000 mg     clopidogrel 75 MG Tabs  Start taking on: April 29, 2023  Commonly known as: PLAVIX   Take 1 Tablet by mouth every day.  Dose: 75 mg     linezolid 600 MG Tabs  Commonly known as: ZYVOX   Take 1 Tablet by mouth every 12 hours.  Dose: 600 mg     nicotine 21 MG/24HR Pt24  Commonly known as: NICODERM   Place 1 Patch on the skin every 24 hours.  Dose: 1 Patch     potassium chloride SA 20 MEQ Tbcr  Commonly known as: Kdur   Take 1 Tablet by mouth every day.  Dose: 20 mEq            CHANGE how you take these medications        Instructions   amLODIPine 5 MG Tabs  Start taking on: April 29, 2023  What changed:   medication strength  how much to take  Commonly known as: NORVASC   Take 1 Tablet by mouth every day.  Dose: 5 mg      atorvastatin 20 MG Tabs  What changed: how much to take  Commonly known as: LIPITOR   Take 2 Tablets by mouth every day.  Dose: 40 mg     furosemide 40 MG Tabs  What changed:   medication strength  how much to take  when to take this  Commonly known as: Lasix   Take 1 Tablet by mouth every day.  Dose: 40 mg            CONTINUE taking these medications        Instructions   aspirin 81 MG Chew chewable tablet  Commonly known as: ASA   Take 1 Tab by mouth every day.  Dose: 81 mg     betamethasone dipropionate 0.05 % Oint   Apply 1 Application. topically 2 times a day as needed (Psoriasis).  Dose: 1 Application.     celecoxib 200 MG Caps  Commonly known as: CELEBREX   Take 200 mg by mouth every day.  Dose: 200 mg     hydrOXYzine HCl 25 MG Tabs  Commonly known as: ATARAX   Take 25 mg by mouth every 6 hours as needed for Anxiety.  Dose: 25 mg     metoprolol  MG Tb24  Commonly known as: TOPROL XL   Take 100 mg by mouth every day.  Dose: 100 mg     morphine ER 30 MG Tbcr tablet  Commonly known as: MS CONTIN   Take 30 mg by mouth every 12 hours.  Dose: 30 mg     Naloxone 4 MG/0.1ML Liqd  Commonly known as: NARCAN   Administer 1 Wilsey into affected nostril(S) one time as needed. Indications: Opioid Overdose  Dose: 1 Spray     oxyCODONE immediate release 10 MG immediate release tablet  Commonly known as: ROXICODONE   Take 10 mg by mouth every 6 hours as needed for Severe Pain.  Dose: 10 mg     pregabalin 300 MG capsule  Commonly known as: LYRICA   Take 300 mg by mouth 2 times a day.  Dose: 300 mg     Secukinumab 150 MG/ML Soaj   Inject 300 mg under the skin every 4 weeks.  Dose: 300 mg     tizanidine 4 MG Tabs  Commonly known as: ZANAFLEX   Take 4 mg by mouth every 6 hours as needed for Muscle Spasms.  Dose: 4 mg            STOP taking these medications      cephALEXin 500 MG Caps  Commonly known as: KEFLEX              Allergies  No Known Allergies    DIET  Orders Placed This Encounter   Procedures    Diet Order Diet:  Regular     Standing Status:   Standing     Number of Occurrences:   1     Order Specific Question:   Diet:     Answer:   Regular [1]       ACTIVITY  As tolerated.  Weight bearing as tolerated    CONSULTATIONS  Vascular surgery- Dr. Kaplan   Infectious disease    PROCEDURES  Procedure:  23175  Percutaneous access of the right common femoral artery with ultrasound guidance  06425  Nonselective catheterization of the infrarenal aorta  10013  Aortoiliac angiogram  11232  7mm by 37mm and 7mm by 17mm balloon expandable uncovered stent placement in the right common iliac artery  47671  Right lower extremity angiography   Closure of the right common femoral artery access site with 6 [place] Angio-Seal closure device which deployed successfully       LABORATORY  Lab Results   Component Value Date    SODIUM 136 04/28/2023    POTASSIUM 3.5 (L) 04/28/2023    CHLORIDE 104 04/28/2023    CO2 22 04/28/2023    GLUCOSE 111 (H) 04/28/2023    BUN 11 04/28/2023    CREATININE 0.73 04/28/2023        Lab Results   Component Value Date    WBC 7.4 04/26/2023    HEMOGLOBIN 11.4 (L) 04/26/2023    HEMATOCRIT 35.1 (L) 04/26/2023    PLATELETCT 276 04/26/2023        Total time of the discharge process exceeds 60 minutes.

## 2023-04-28 NOTE — PROGRESS NOTES
Patient is being discharged home. Patient is A&Ox4. IV removed on unit. Discharge instructions provided to patient and reviewed. Patient verbalized understanding and all questions and concerns were addressed. Patient awaiting daughter in lobby.

## 2023-06-06 ENCOUNTER — HOSPITAL ENCOUNTER (INPATIENT)
Facility: MEDICAL CENTER | Age: 53
LOS: 2 days | DRG: 253 | End: 2023-06-08
Attending: EMERGENCY MEDICINE | Admitting: STUDENT IN AN ORGANIZED HEALTH CARE EDUCATION/TRAINING PROGRAM
Payer: COMMERCIAL

## 2023-06-06 ENCOUNTER — HOSPITAL ENCOUNTER (OUTPATIENT)
Dept: RADIOLOGY | Facility: MEDICAL CENTER | Age: 53
End: 2023-06-06

## 2023-06-06 DIAGNOSIS — I99.8 ACUTE LOWER LIMB ISCHEMIA: ICD-10-CM

## 2023-06-06 DIAGNOSIS — R21 RASH AND NONSPECIFIC SKIN ERUPTION: ICD-10-CM

## 2023-06-06 PROBLEM — E66.811 CLASS 1 OBESITY IN ADULT: Status: RESOLVED | Noted: 2023-04-22 | Resolved: 2023-06-06

## 2023-06-06 PROBLEM — E66.9 CLASS 1 OBESITY IN ADULT: Status: RESOLVED | Noted: 2023-04-22 | Resolved: 2023-06-06

## 2023-06-06 LAB
ALBUMIN SERPL BCP-MCNC: 3.4 G/DL (ref 3.2–4.9)
ALBUMIN/GLOB SERPL: 1 G/DL
ALP SERPL-CCNC: 189 U/L (ref 30–99)
ALT SERPL-CCNC: 29 U/L (ref 2–50)
ANION GAP SERPL CALC-SCNC: 11 MMOL/L (ref 7–16)
APTT PPP: >240 SEC (ref 24.7–36)
AST SERPL-CCNC: 22 U/L (ref 12–45)
BASOPHILS # BLD AUTO: 0.5 % (ref 0–1.8)
BASOPHILS # BLD: 0.04 K/UL (ref 0–0.12)
BILIRUB SERPL-MCNC: 0.3 MG/DL (ref 0.1–1.5)
BUN SERPL-MCNC: 13 MG/DL (ref 8–22)
CALCIUM ALBUM COR SERPL-MCNC: 9 MG/DL (ref 8.5–10.5)
CALCIUM SERPL-MCNC: 8.5 MG/DL (ref 8.5–10.5)
CHLORIDE SERPL-SCNC: 109 MMOL/L (ref 96–112)
CO2 SERPL-SCNC: 23 MMOL/L (ref 20–33)
CREAT SERPL-MCNC: 0.82 MG/DL (ref 0.5–1.4)
EOSINOPHIL # BLD AUTO: 0.29 K/UL (ref 0–0.51)
EOSINOPHIL NFR BLD: 3.9 % (ref 0–6.9)
ERYTHROCYTE [DISTWIDTH] IN BLOOD BY AUTOMATED COUNT: 46.3 FL (ref 35.9–50)
GFR SERPLBLD CREATININE-BSD FMLA CKD-EPI: 86 ML/MIN/1.73 M 2
GLOBULIN SER CALC-MCNC: 3.4 G/DL (ref 1.9–3.5)
GLUCOSE SERPL-MCNC: 93 MG/DL (ref 65–99)
HCT VFR BLD AUTO: 36.4 % (ref 37–47)
HGB BLD-MCNC: 12 G/DL (ref 12–16)
IMM GRANULOCYTES # BLD AUTO: 0.02 K/UL (ref 0–0.11)
IMM GRANULOCYTES NFR BLD AUTO: 0.3 % (ref 0–0.9)
INR PPP: 1.21 (ref 0.87–1.13)
LYMPHOCYTES # BLD AUTO: 2.84 K/UL (ref 1–4.8)
LYMPHOCYTES NFR BLD: 38.5 % (ref 22–41)
MCH RBC QN AUTO: 28.2 PG (ref 27–33)
MCHC RBC AUTO-ENTMCNC: 33 G/DL (ref 32.2–35.5)
MCV RBC AUTO: 85.6 FL (ref 81.4–97.8)
MONOCYTES # BLD AUTO: 0.55 K/UL (ref 0–0.85)
MONOCYTES NFR BLD AUTO: 7.5 % (ref 0–13.4)
NEUTROPHILS # BLD AUTO: 3.64 K/UL (ref 1.82–7.42)
NEUTROPHILS NFR BLD: 49.3 % (ref 44–72)
NRBC # BLD AUTO: 0 K/UL
NRBC BLD-RTO: 0 /100 WBC (ref 0–0.2)
PLATELET # BLD AUTO: 213 K/UL (ref 164–446)
PMV BLD AUTO: 9.7 FL (ref 9–12.9)
POTASSIUM SERPL-SCNC: 4.1 MMOL/L (ref 3.6–5.5)
PROT SERPL-MCNC: 6.8 G/DL (ref 6–8.2)
PROTHROMBIN TIME: 15.1 SEC (ref 12–14.6)
RBC # BLD AUTO: 4.25 M/UL (ref 4.2–5.4)
SODIUM SERPL-SCNC: 143 MMOL/L (ref 135–145)
T PALLIDUM AB SER QL IA: NORMAL
UFH PPP CHRO-ACNC: 0.43 IU/ML
UFH PPP CHRO-ACNC: >1.1 IU/ML
WBC # BLD AUTO: 7.4 K/UL (ref 4.8–10.8)

## 2023-06-06 PROCEDURE — 85025 COMPLETE CBC W/AUTO DIFF WBC: CPT

## 2023-06-06 PROCEDURE — 86780 TREPONEMA PALLIDUM: CPT

## 2023-06-06 PROCEDURE — 99223 1ST HOSP IP/OBS HIGH 75: CPT | Mod: AI,25 | Performed by: STUDENT IN AN ORGANIZED HEALTH CARE EDUCATION/TRAINING PROGRAM

## 2023-06-06 PROCEDURE — 700102 HCHG RX REV CODE 250 W/ 637 OVERRIDE(OP): Performed by: STUDENT IN AN ORGANIZED HEALTH CARE EDUCATION/TRAINING PROGRAM

## 2023-06-06 PROCEDURE — 770020 HCHG ROOM/CARE - TELE (206)

## 2023-06-06 PROCEDURE — 85610 PROTHROMBIN TIME: CPT

## 2023-06-06 PROCEDURE — 85730 THROMBOPLASTIN TIME PARTIAL: CPT

## 2023-06-06 PROCEDURE — 99285 EMERGENCY DEPT VISIT HI MDM: CPT

## 2023-06-06 PROCEDURE — 700111 HCHG RX REV CODE 636 W/ 250 OVERRIDE (IP): Mod: UD | Performed by: EMERGENCY MEDICINE

## 2023-06-06 PROCEDURE — 99406 BEHAV CHNG SMOKING 3-10 MIN: CPT | Performed by: STUDENT IN AN ORGANIZED HEALTH CARE EDUCATION/TRAINING PROGRAM

## 2023-06-06 PROCEDURE — 36415 COLL VENOUS BLD VENIPUNCTURE: CPT

## 2023-06-06 PROCEDURE — 85520 HEPARIN ASSAY: CPT

## 2023-06-06 PROCEDURE — 96366 THER/PROPH/DIAG IV INF ADDON: CPT

## 2023-06-06 PROCEDURE — 96375 TX/PRO/DX INJ NEW DRUG ADDON: CPT

## 2023-06-06 PROCEDURE — 80053 COMPREHEN METABOLIC PANEL: CPT

## 2023-06-06 PROCEDURE — 700111 HCHG RX REV CODE 636 W/ 250 OVERRIDE (IP): Performed by: STUDENT IN AN ORGANIZED HEALTH CARE EDUCATION/TRAINING PROGRAM

## 2023-06-06 PROCEDURE — 96365 THER/PROPH/DIAG IV INF INIT: CPT

## 2023-06-06 PROCEDURE — A9270 NON-COVERED ITEM OR SERVICE: HCPCS | Performed by: STUDENT IN AN ORGANIZED HEALTH CARE EDUCATION/TRAINING PROGRAM

## 2023-06-06 RX ORDER — ATORVASTATIN CALCIUM 40 MG/1
40 TABLET, FILM COATED ORAL DAILY
Status: DISCONTINUED | OUTPATIENT
Start: 2023-06-06 | End: 2023-06-08 | Stop reason: HOSPADM

## 2023-06-06 RX ORDER — MORPHINE SULFATE 4 MG/ML
4 INJECTION INTRAVENOUS EVERY 4 HOURS PRN
Status: DISCONTINUED | OUTPATIENT
Start: 2023-06-06 | End: 2023-06-08 | Stop reason: HOSPADM

## 2023-06-06 RX ORDER — ONDANSETRON 2 MG/ML
4 INJECTION INTRAMUSCULAR; INTRAVENOUS ONCE
Status: COMPLETED | OUTPATIENT
Start: 2023-06-06 | End: 2023-06-06

## 2023-06-06 RX ORDER — MORPHINE SULFATE 4 MG/ML
4 INJECTION INTRAVENOUS ONCE
Status: COMPLETED | OUTPATIENT
Start: 2023-06-06 | End: 2023-06-06

## 2023-06-06 RX ORDER — METOPROLOL SUCCINATE 50 MG/1
100 TABLET, EXTENDED RELEASE ORAL DAILY
Status: DISCONTINUED | OUTPATIENT
Start: 2023-06-06 | End: 2023-06-08 | Stop reason: HOSPADM

## 2023-06-06 RX ORDER — HEPARIN SODIUM 1000 [USP'U]/ML
40 INJECTION, SOLUTION INTRAVENOUS; SUBCUTANEOUS PRN
Status: DISCONTINUED | OUTPATIENT
Start: 2023-06-06 | End: 2023-06-07

## 2023-06-06 RX ORDER — OXYCODONE HYDROCHLORIDE AND ACETAMINOPHEN 5; 325 MG/1; MG/1
2 TABLET ORAL ONCE
Status: DISCONTINUED | OUTPATIENT
Start: 2023-06-06 | End: 2023-06-06

## 2023-06-06 RX ORDER — CLOPIDOGREL BISULFATE 75 MG/1
75 TABLET ORAL DAILY
Status: DISCONTINUED | OUTPATIENT
Start: 2023-06-06 | End: 2023-06-07

## 2023-06-06 RX ORDER — HEPARIN SODIUM 5000 [USP'U]/100ML
0-30 INJECTION, SOLUTION INTRAVENOUS CONTINUOUS
Status: DISCONTINUED | OUTPATIENT
Start: 2023-06-06 | End: 2023-06-07

## 2023-06-06 RX ORDER — PREGABALIN 150 MG/1
300 CAPSULE ORAL DAILY
Status: DISCONTINUED | OUTPATIENT
Start: 2023-06-06 | End: 2023-06-08 | Stop reason: HOSPADM

## 2023-06-06 RX ORDER — AMLODIPINE BESYLATE 5 MG/1
5 TABLET ORAL DAILY
Status: DISCONTINUED | OUTPATIENT
Start: 2023-06-06 | End: 2023-06-08 | Stop reason: HOSPADM

## 2023-06-06 RX ORDER — ASPIRIN 81 MG/1
81 TABLET, CHEWABLE ORAL DAILY
Status: DISCONTINUED | OUTPATIENT
Start: 2023-06-06 | End: 2023-06-08 | Stop reason: HOSPADM

## 2023-06-06 RX ORDER — ACETAMINOPHEN 325 MG/1
650 TABLET ORAL EVERY 6 HOURS PRN
Status: DISCONTINUED | OUTPATIENT
Start: 2023-06-06 | End: 2023-06-08 | Stop reason: HOSPADM

## 2023-06-06 RX ADMIN — HEPARIN SODIUM 18 UNITS/KG/HR: 5000 INJECTION, SOLUTION INTRAVENOUS at 16:16

## 2023-06-06 RX ADMIN — ASPIRIN 81 MG 81 MG: 81 TABLET ORAL at 18:51

## 2023-06-06 RX ADMIN — ATORVASTATIN CALCIUM 40 MG: 40 TABLET, FILM COATED ORAL at 20:48

## 2023-06-06 RX ADMIN — METOPROLOL SUCCINATE 100 MG: 50 TABLET, EXTENDED RELEASE ORAL at 18:51

## 2023-06-06 RX ADMIN — CLOPIDOGREL BISULFATE 75 MG: 75 TABLET ORAL at 18:51

## 2023-06-06 RX ADMIN — PREGABALIN 300 MG: 150 CAPSULE ORAL at 18:51

## 2023-06-06 RX ADMIN — MORPHINE SULFATE 4 MG: 4 INJECTION, SOLUTION INTRAMUSCULAR; INTRAVENOUS at 16:26

## 2023-06-06 RX ADMIN — AMLODIPINE BESYLATE 5 MG: 5 TABLET ORAL at 18:51

## 2023-06-06 RX ADMIN — ONDANSETRON 4 MG: 2 INJECTION INTRAMUSCULAR; INTRAVENOUS at 16:23

## 2023-06-06 RX ADMIN — MORPHINE SULFATE 4 MG: 4 INJECTION, SOLUTION INTRAMUSCULAR; INTRAVENOUS at 20:47

## 2023-06-06 ASSESSMENT — PATIENT HEALTH QUESTIONNAIRE - PHQ9
9. THOUGHTS THAT YOU WOULD BE BETTER OFF DEAD, OR OF HURTING YOURSELF: NOT AT ALL
6. FEELING BAD ABOUT YOURSELF - OR THAT YOU ARE A FAILURE OR HAVE LET YOURSELF OR YOUR FAMILY DOWN: NOT AL ALL
SUM OF ALL RESPONSES TO PHQ QUESTIONS 1-9: 3
5. POOR APPETITE OR OVEREATING: NOT AT ALL
8. MOVING OR SPEAKING SO SLOWLY THAT OTHER PEOPLE COULD HAVE NOTICED. OR THE OPPOSITE, BEING SO FIGETY OR RESTLESS THAT YOU HAVE BEEN MOVING AROUND A LOT MORE THAN USUAL: NOT AT ALL
4. FEELING TIRED OR HAVING LITTLE ENERGY: SEVERAL DAYS
7. TROUBLE CONCENTRATING ON THINGS, SUCH AS READING THE NEWSPAPER OR WATCHING TELEVISION: NOT AT ALL
2. FEELING DOWN, DEPRESSED, IRRITABLE, OR HOPELESS: SEVERAL DAYS
3. TROUBLE FALLING OR STAYING ASLEEP OR SLEEPING TOO MUCH: SEVERAL DAYS
SUM OF ALL RESPONSES TO PHQ9 QUESTIONS 1 AND 2: 1
1. LITTLE INTEREST OR PLEASURE IN DOING THINGS: NOT AT ALL

## 2023-06-06 ASSESSMENT — COGNITIVE AND FUNCTIONAL STATUS - GENERAL
WALKING IN HOSPITAL ROOM: A LITTLE
SUGGESTED CMS G CODE MODIFIER MOBILITY: CJ
SUGGESTED CMS G CODE MODIFIER DAILY ACTIVITY: CH
MOBILITY SCORE: 22
DAILY ACTIVITIY SCORE: 24
CLIMB 3 TO 5 STEPS WITH RAILING: A LITTLE

## 2023-06-06 ASSESSMENT — PAIN DESCRIPTION - PAIN TYPE
TYPE: ACUTE PAIN

## 2023-06-06 ASSESSMENT — ENCOUNTER SYMPTOMS
MUSCULOSKELETAL NEGATIVE: 1
EYES NEGATIVE: 1
RESPIRATORY NEGATIVE: 1
PSYCHIATRIC NEGATIVE: 1
CARDIOVASCULAR NEGATIVE: 1
GASTROINTESTINAL NEGATIVE: 1
WEAKNESS: 1

## 2023-06-06 ASSESSMENT — FIBROSIS 4 INDEX: FIB4 SCORE: 0.71

## 2023-06-06 NOTE — PROGRESS NOTES
VASCULAR SURGERY SERVICE                        Progress Note  _________________________________    Notified of patient arrival.  CTA prior to arrival shows the right iliac stent placed 4/24/23 has reoccluded for unclear reasons.  Will schedule new stent placement tomorrow morning  Heparin gtt  NPO except meds after midnight  Appreciate hospitalist service's support    Marlon Kaplan MD  [facility]   [facility] preferred or call my office [phone]  __________________________________________________  Patient:Lorene Woodard   MRN:[id number]

## 2023-06-06 NOTE — ED TRIAGE NOTES
Chief Complaint   Patient presents with    Sent by MD Thomas [facility] as a transfer from [facility]. Pt had Dr. White today in [place] for R foot. States R foot has been falling asleep often and reports it gets very cold. Seen in ED there, dx with occulusion of iliac stents which she had placed in April. Per reports, no pulses able to be obtained on R foot, however on arrival, R DPP palpated.      Pt [facility] for above.     Pt given 8143 unit bolus of heparin at previous facility. Started on heparin gtt at 18.4 units/kg/hr.     Right big toe and 2nd toe have necrotic tissue. Pt states it has improved significantly from what it was before.    Pt A&Ox4, GCS 15, no distress noted.     On all monitoring, call light within reach, chart up for ERP.

## 2023-06-06 NOTE — ED NOTES
Med rec updated and complete. Allergies reviewed. Confirmed name and date of birth.   Pt denies antibiotic use in last 30 days.    Last Plavix and ASA dose 06/05/23.    Last Secukinumab injection 05/25/23    Home pharmileana Russell [phone]      Secukinumab filled at  [facility]

## 2023-06-06 NOTE — ED NOTES
Heparin gtt started per MAR orders, medicated for pain as well. Pt resting comfortably, updated on POC by ERP. On all monitoring, call light within reach, no other needs at this time.

## 2023-06-06 NOTE — ASSESSMENT & PLAN NOTE
I counseled the patient for 4 minutes regarding smoking cessation using methods such as nicotine replacement therapy with patches and gum and medications such as Wellbutrin or Chantix.  I also discussed with patient breathing techniques and meditation as well as regular physical activity, which will assist patient with smoking cessation

## 2023-06-06 NOTE — ASSESSMENT & PLAN NOTE
Spoke with SARA, who spoke with vascular surgeon.  Patient initially had a stent placement on her right common iliac artery on April 24.  She has been on aspirin and Plavix since then.  She reports a progressively worsening pain for the last week and a half in her right hip and CT angiogram prior to arrival shows a reoccluded stent, which is likely the culprit for her symptoms. The tips of her right first and second toe show necrosis but this has been chronic and non changing for the past few months. Patient started on heparin drip as recommended by vascular surgeon, and will be taken to the OR in a.m.  Monitor closely overnight for worsening pain in her right limb, bleeding, hemorrhagic shock from heparin.  6/7/2023:  Patient status post right lower extremity angiogram with resulting excellent perfusion in her right lower limb.  Appreciate vascular surgery recommendations.  Patient continue on aspirin addition of Plavix indefinitely.  Patient will have 2 to 4-week follow-up with vascular surgery.

## 2023-06-06 NOTE — H&P
Hospital Medicine History & Physical Note    Date of Service  6/6/2023    Primary Care Physician  Renato Mays M.D.    Consultants  Vascular Surgery     Code Status  Full Code    Chief Complaint  Chief Complaint   Patient presents with    Sent by MD Guzman [facility] as a transfer from [facility]. Pt had Dr. White today in [place] for R foot. States R foot has been falling asleep often and reports it gets very cold. Seen in ED there, dx with occulusion of iliac stents which she had placed in April. Per reports, no pulses able to be obtained on R foot, however on arrival, R DPP palpated.        History of Presenting Illness  Lorene Woodard is a 52 y.o. female who presented 6/6/2023 with right hip pain for the last week and a half.    Patient has a history of COPD not on oxygen, tobacco abuse, hypertension, hyperlipidemia.  She initially presented for Right lower extremity cellulitis that was treated with antibiotics.  Vascular surgery consulted, patient had stent placement of right common iliac artery and was placed on dual antiplatelet therapy with aspirin and Plavix.    Since patient has been home, she states she has been compliant with her medications, including aspirin and Plavix.  For the last week and a half, she reported a progressively worsening right hip pain.  She then had a follow-up appointment today and was found to have a reoccluded iliac stent, and was recommended to come into the ED for evaluation.    In the ED, patient found to have normal vital signs.  CT angiogram prior to arrival shows patient has a right iliac stent that has reoccluded.    I discussed the plan of care with patient.    Review of Systems  Review of Systems   Constitutional:  Positive for malaise/fatigue.   HENT: Negative.     Eyes: Negative.    Respiratory: Negative.     Cardiovascular: Negative.    Gastrointestinal: Negative.    Genitourinary: Negative.    Musculoskeletal: Negative.    Skin: Negative.     Neurological:  Positive for weakness.   Endo/Heme/Allergies: Negative.    Psychiatric/Behavioral: Negative.         Past Medical History   has a past medical history of CAD (coronary artery disease) (December 2012), Depression, Hyperlipidemia, Hypertension, Psoriasis, Shortness of breath (February 2014), and Smoking.    Surgical History  No pertinent surgical hx      Family History  family history includes Heart Disease in her sister.   Family history reviewed with patient. There is no family history that is pertinent to the chief complaint.     Social History   reports that she has quit smoking. Her smoking use included cigarettes. She has never used smokeless tobacco. She reports current alcohol use. She reports that she does not currently use drugs.    Allergies  No Known Allergies    Medications  Prior to Admission Medications   Prescriptions Last Dose Informant Patient Reported? Taking?   Secukinumab 150 MG/ML Solution Auto-injector 5/25/2023 at 1300 Patient No No   Sig: Inject 300 mg under the skin every 4 weeks.   amLODIPine (NORVASC) 5 MG Tab 6/5/2023 at 1000 Patient No No   Sig: Take 1 Tablet by mouth every day.   aspirin (ASA) 81 MG CHEW 6/5/2023 at 1000 Patient No No   Sig: Take 1 Tab by mouth every day.   atorvastatin (LIPITOR) 20 MG Tab 6/5/2023 at 2200 Patient No No   Sig: Take 2 Tablets by mouth every day.   celecoxib (CELEBREX) 200 MG Cap 6/5/2023 at 1000 Patient Yes No   Sig: Take 200 mg by mouth every day.   clopidogrel (PLAVIX) 75 MG Tab 6/5/2023 at 1000 Patient No No   Sig: Take 1 Tablet by mouth every day.   furosemide (LASIX) 40 MG Tab 6/5/2023 at 1000 Patient No No   Sig: Take 1 Tablet by mouth every day.   hydrOXYzine HCl (ATARAX) 25 MG Tab 5/31/2023 at 2200 Patient Yes No   Sig: Take 25 mg by mouth every 6 hours as needed for Anxiety.   metoprolol SR (TOPROL XL) 100 MG TABLET SR 24 HR 6/5/2023 at 1000 Patient Yes No   Sig: Take 100 mg by mouth every day.   morphine ER (MS CONTIN) 30 MG Tab  CR tablet 6/6/2023 at 0500 Patient Yes No   Sig: Take 30 mg by mouth every 12 hours.   oxyCODONE immediate release (ROXICODONE) 10 MG immediate release tablet 6/6/2023 at 0500 Patient Yes No   Sig: Take 10 mg by mouth every 6 hours as needed for Severe Pain.   potassium chloride SA (KDUR) 20 MEQ Tab CR 6/6/2023 at 1000 Patient No No   Sig: Take 1 Tablet by mouth every day.   pregabalin (LYRICA) 300 MG capsule 6/6/2023 at 1000 Patient Yes No   Sig: Take 300 mg by mouth every day.      Facility-Administered Medications: None       Physical Exam  Temp:  [36.4 °C (97.5 °F)] 36.4 °C (97.5 °F)  Pulse:  [55] 55  Resp:  [16] 16  BP: (140)/(77) 140/77  SpO2:  [98 %] 98 %  Blood Pressure: (!) 140/77   Temperature: 36.4 °C (97.5 °F)   Pulse: (!) 55   Respiration: 16   Pulse Oximetry: 98 %       Physical Exam  Constitutional:       Appearance: Normal appearance. She is obese.   HENT:      Head: Normocephalic.      Nose: Nose normal.      Mouth/Throat:      Mouth: Mucous membranes are moist.   Eyes:      Pupils: Pupils are equal, round, and reactive to light.   Cardiovascular:      Rate and Rhythm: Normal rate and regular rhythm.      Pulses: Normal pulses.   Pulmonary:      Effort: Pulmonary effort is normal.      Breath sounds: Normal breath sounds.   Abdominal:      General: Abdomen is flat. Bowel sounds are normal.      Palpations: Abdomen is soft.   Musculoskeletal:      Cervical back: Neck supple.      Comments: Bilateral lower extremities have chronic venous changes  R foot warm to touch, feeble dorsalis pedis pulse palpated  L foot warm to touch, DP 2+  R first toe and second toe have small area of necrosis      Skin:     General: Skin is warm.   Neurological:      General: No focal deficit present.      Mental Status: She is alert and oriented to person, place, and time. Mental status is at baseline.   Psychiatric:         Mood and Affect: Mood normal.         Behavior: Behavior normal.         Thought Content: Thought  content normal.         Judgment: Judgment normal.         Laboratory:  Recent Labs     06/06/23  1515   WBC 7.4   RBC 4.25   HEMOGLOBIN 12.0   HEMATOCRIT 36.4*   MCV 85.6   MCH 28.2   MCHC 33.0   RDW 46.3   PLATELETCT 213   MPV 9.7     Recent Labs     06/06/23  1515   SODIUM 143   POTASSIUM 4.1   CHLORIDE 109   CO2 23   GLUCOSE 93   BUN 13   CREATININE 0.82   CALCIUM 8.5     Recent Labs     06/06/23  1515   ALTSGPT 29   ASTSGOT 22   ALKPHOSPHAT 189*   TBILIRUBIN 0.3   GLUCOSE 93         No results for input(s): NTPROBNP in the last 72 hours.      No results for input(s): TROPONINT in the last 72 hours.    Imaging:  OUTSIDE IMAGES-CT ABDOMEN /PELVIS   Final Result      IR-EXTREMITY ANGIOGRAM-UNILATERAL RIGHT    (Results Pending)       no X-Ray or EKG requiring interpretation    Assessment/Plan:  Justification for Admission Status  I anticipate this patient will require at least two midnights for appropriate medical management, necessitating inpatient admission because patient has occluded right common iliac stent as demonstrated by CT angiogram and will need likely restenting by vascular surgery    Patient will need a Telemetry bed on SURGICAL service .  The need is secondary to patient on heparin drip.    * Acute lower limb ischemia- (present on admission)  Assessment & Plan  Spoke with ERP, who spoke with vascular surgeon.  Patient initially had a stent placement on her right common iliac artery on April 24.  She has been on aspirin and Plavix since then.  She reports a progressively worsening pain for the last week and a half in her right hip and CT angiogram prior to arrival shows a reoccluded stent, which is likely the culprit for her symptoms. The tips of her right first and second toe show necrosis but this has been chronic and non changing for the past few months. Patient started on heparin drip as recommended by vascular surgeon, and will be taken to the OR in a.m.  Monitor closely overnight for worsening  pain in her right limb, bleeding, hemorrhagic shock from heparin.    Tobacco abuse  Assessment & Plan  I counseled the patient for 4 minutes regarding smoking cessation using methods such as nicotine replacement therapy with patches and gum and medications such as Wellbutrin or Chantix.  I also discussed with patient breathing techniques and meditation as well as regular physical activity, which will assist patient with smoking cessation      Hyperlipidemia  Assessment & Plan  Continue home dose of Lipitor    Hypertension  Assessment & Plan  Restart home medication        VTE prophylaxis: therapeutic anticoagulation with heparin

## 2023-06-06 NOTE — ED PROVIDER NOTES
ER Provider Note    Scribed for Madie Moreau M.D. by Carolann Anders. 6/6/2023  3:41 PM    Primary Care Provider: Renato Mays M.D.    CHIEF COMPLAINT  Chief Complaint   Patient presents with    Sent by MD Guzman BIB [facility] as a transfer from [facility]. Pt had Dr. White today in [place] for R foot. States R foot has been falling asleep often and reports it gets very cold. Seen in ED there, dx with occulusion of iliac stents which she had placed in April. Per reports, no pulses able to be obtained on R foot, however on arrival, R DPP palpated.      EXTERNAL RECORDS REVIEWED  Outpatient notes from [facility] shows that the patient had a CT-Angiogram of the right leg with runoff and it showed stent is occluded. There is blood flow in the internal and external iliac artery possibly due to collateral flow, but right common iliac artery stent is occluded. Patient is on Plavix and aspirin. Lactic Acid was 2.0.     HPI/ROS  OUTSIDE HISTORIAN(S):  None    LIMITATION TO HISTORY   None    Lorene Woodard is a 52 y.o. female who presents to the Emergency Department as a transfer from [facility] for right foot pain onset 4-5 days ago. She had an right iliac stent placed on April 24, 2023 here at [facility] by Dr. Kaplan. She was seen by her PCP in [place] because her right foot has been cool to the touch and falling asleep.  She also has been noting some discomfort in her right anterolateral thigh for the last 4 to 5 days.  She was then sent to [facility] ER due to concerns of an ischemic limb. They did imaging of her leg and found to have an occlusion of her iliac stents and was then sent to [facility].  She received a heparin bolus at outlying facility and was started on heparin drip per vascular surgery recommendations.  She states that her feet have been getting colder over the last month. She has been compliant with taking her Plavix and has not missed doses except  today. She denies any recent falls or injuries. She stopped smoking cigarettes 1.5 months ago. She does not experience any abdominal pain. No history of diabetes. Patient has rash to palms of bilateral hands x2 days.  She describes the rash as itchy and painful.. She states that she has not been sexually active in 15 years and denies possibility of Syphilis.  Patient has blackened skin to the tips of the right great toe and second toe.  She says that the blackened areas are actually much better than they were back in January when she initially developed the black toes.  Patient has not smoked cigarettes since her vascular surgery in April.    PAST MEDICAL HISTORY  Past Medical History:   Diagnosis Date    CAD (coronary artery disease) December 2012    NSTEMI. Coronary angiogram with 60% LAD stenosis, 40% stenosis proximal LAD, 20% stenosis mid circumflex.    Depression     Hyperlipidemia     Hypertension     Borderline    Psoriasis     Shortness of breath February 2014    Echocardiogram with normal LV size, LVEF 60-65%, no valvular abnormalities.    Smoking        SURGICAL HISTORY  History reviewed. No pertinent surgical history.    FAMILY HISTORY  Family History   Problem Relation Age of Onset    Heart Disease Sister        SOCIAL HISTORY   reports that she has quit smoking. Her smoking use included cigarettes. She has never used smokeless tobacco. She reports current alcohol use. She reports that she does not currently use drugs.    CURRENT MEDICATIONS  Previous Medications    ACETAMINOPHEN (TYLENOL) 500 MG TAB    Take 2 Tablets by mouth every 6 hours as needed for Mild Pain or Moderate Pain.    AMLODIPINE (NORVASC) 5 MG TAB    Take 1 Tablet by mouth every day.    ASPIRIN (ASA) 81 MG CHEW    Take 1 Tab by mouth every day.    ATORVASTATIN (LIPITOR) 20 MG TAB    Take 2 Tablets by mouth every day.    BETAMETHASONE DIPROPIONATE 0.05 % OINTMENT    Apply 1 Application. topically 2 times a day as needed (Psoriasis).     "CELECOXIB (CELEBREX) 200 MG CAP    Take 200 mg by mouth every day.    CLOPIDOGREL (PLAVIX) 75 MG TAB    Take 1 Tablet by mouth every day.    FUROSEMIDE (LASIX) 40 MG TAB    Take 1 Tablet by mouth every day.    HYDROXYZINE HCL (ATARAX) 25 MG TAB    Take 25 mg by mouth every 6 hours as needed for Anxiety.    LINEZOLID (ZYVOX) 600 MG TAB    Take 1 Tablet by mouth every 12 hours.    METOPROLOL SR (TOPROL XL) 100 MG TABLET SR 24 HR    Take 100 mg by mouth every day.    MORPHINE ER (MS CONTIN) 30 MG TAB CR TABLET    Take 30 mg by mouth every 12 hours.    NALOXONE (NARCAN) 4 MG/0.1ML LIQUID    Administer 1 Spray into affected nostril(S) one time as needed. Indications: Opioid Overdose    NICOTINE (NICODERM) 21 MG/24HR PATCH 24 HR    Place 1 Patch on the skin every 24 hours.    OXYCODONE IMMEDIATE RELEASE (ROXICODONE) 10 MG IMMEDIATE RELEASE TABLET    Take 10 mg by mouth every 6 hours as needed for Severe Pain.    POTASSIUM CHLORIDE SA (KDUR) 20 MEQ TAB CR    Take 1 Tablet by mouth every day.    PREGABALIN (LYRICA) 300 MG CAPSULE    Take 300 mg by mouth 2 times a day.    SECUKINUMAB 150 MG/ML SOLUTION AUTO-INJECTOR    Inject 300 mg under the skin every 4 weeks.    TIZANIDINE (ZANAFLEX) 4 MG TAB    Take 4 mg by mouth every 6 hours as needed for Muscle Spasms.       ALLERGIES  Patient has no known allergies.    PHYSICAL EXAM  BP (!) 140/77   Pulse (!) 55   Temp 36.4 °C (97.5 °F) (Temporal)   Resp 16   Ht 1.702 m (5' 7\")   Wt 102 kg (224 lb 13.9 oz)   LMP 03/13/2017   SpO2 98%   BMI 35.22 kg/m²     Constitutional: Well developed, well nourished; No acute distress; Non-toxic appearance.   HENT: Normocephalic, atraumatic; Bilateral external ears normal; Oropharyngeal exam deferred due to COVID-19 outbreak and lack of oropharyngeal complaints.  Eyes: PERRL, EOMI, Conjunctiva normal. No discharge.   Neck:  Supple, nontender midline; No stridor; No nuchal rigidity.   Lymphatic: No cervical lymphadenopathy noted. "   Cardiovascular: Regular rate and rhythm without murmurs, rubs, or gallop.   Thorax & Lungs: No respiratory distress, Decreased breath sounds throughout without wheezing, rales or rhonchi. Nontender chest wall. No crepitus or subcutaneous air  Abdomen: Soft, nontender, bowel sounds normal. No obvious masses; No pulsatile masses; no rebound, guarding, or peritoneal signs.   Skin: Macular papular rash to the palms of hands. Good color; warm and dry without petechia.  Psoriatic patches to extremities bilaterally.  Skin is warm and dry.  Back: Nontender, No CVA tenderness.   Extremities: Right lower extremity: Necrotic tissue to the tips of the right great toe and second toe with some mild surrounding erythema at the border of the necrotic area proximally. Palpable pulses on the left foot, but no palpable pulses on the right.  There is some very subtle and faint monophasic blood flow with Doppler distal right foot.  Tenderness over the anterolateral aspect of the right upper leg without erythema, induration or overlying skin changes., No edema; Nontender calves or saphenous, No cyanosis, No clubbing.   Musculoskeletal: Good range of motion in all major joints. No tenderness to palpation or major deformities noted.   Neurologic: Alert & oriented x 4, clear speech.     DIAGNOSTIC STUDIES & PROCEDURES    Labs:   Results for orders placed or performed during the hospital encounter of 06/06/23   CBC WITH DIFFERENTIAL   Result Value Ref Range    WBC 7.4 4.8 - 10.8 K/uL    RBC 4.25 4.20 - 5.40 M/uL    Hemoglobin 12.0 12.0 - 16.0 g/dL    Hematocrit 36.4 (L) 37.0 - 47.0 %    MCV 85.6 81.4 - 97.8 fL    MCH 28.2 27.0 - 33.0 pg    MCHC 33.0 32.2 - 35.5 g/dL    RDW 46.3 35.9 - 50.0 fL    Platelet Count 213 164 - 446 K/uL    MPV 9.7 9.0 - 12.9 fL    Neutrophils-Polys 49.30 44.00 - 72.00 %    Lymphocytes 38.50 22.00 - 41.00 %    Monocytes 7.50 0.00 - 13.40 %    Eosinophils 3.90 0.00 - 6.90 %    Basophils 0.50 0.00 - 1.80 %     Immature Granulocytes 0.30 0.00 - 0.90 %    Nucleated RBC 0.00 0.00 - 0.20 /100 WBC    Neutrophils (Absolute) 3.64 1.82 - 7.42 K/uL    Lymphs (Absolute) 2.84 1.00 - 4.80 K/uL    Monos (Absolute) 0.55 0.00 - 0.85 K/uL    Eos (Absolute) 0.29 0.00 - 0.51 K/uL    Baso (Absolute) 0.04 0.00 - 0.12 K/uL    Immature Granulocytes (abs) 0.02 0.00 - 0.11 K/uL    NRBC (Absolute) 0.00 K/uL   COMP METABOLIC PANEL   Result Value Ref Range    Co2 23 20 - 33 mmol/L    Glucose 93 65 - 99 mg/dL    Bun 13 8 - 22 mg/dL    Creatinine 0.82 0.50 - 1.40 mg/dL    Calcium 8.5 8.5 - 10.5 mg/dL    AST(SGOT) 22 12 - 45 U/L    ALT(SGPT) 29 2 - 50 U/L    Alkaline Phosphatase 189 (H) 30 - 99 U/L    Total Bilirubin 0.3 0.1 - 1.5 mg/dL    Albumin 3.4 3.2 - 4.9 g/dL    Total Protein 6.8 6.0 - 8.2 g/dL    Globulin 3.4 1.9 - 3.5 g/dL    A-G Ratio 1.0 g/dL   CORRECTED CALCIUM   Result Value Ref Range    Correct Calcium 9.0 8.5 - 10.5 mg/dL   ESTIMATED GFR   Result Value Ref Range    GFR (CKD-EPI) 86 >60 mL/min/1.73 m 2      All labs reviewed by me.    Radiology:   The attending Emergency Physician has independently interpreted the diagnostic imaging associated with this visit and is awaiting the final reading from the radiologist, which will be displayed below.      Radiologist interpretation:     OUTSIDE IMAGES-CT ABDOMEN /PELVIS   Final Result      IR-EXTREMITY ANGIOGRAM-UNILATERAL RIGHT    (Results Pending)        COURSE & MEDICAL DECISION MAKING    ED Observation Status? No; Patient does not meet criteria for ED Observation.     INITIAL ASSESSMENT AND PLAN  Care Narrative: Patient presents to the ER as a transfer from [place] after she was found to have an occluded right iliac stent.  The stent was initially placed here at [facility] by Dr. Kaplan at the end of April.  She stopped smoking after her surgery.  However, she has noticed some gradual coolness to her right foot since the surgery and over the last 4 to 5 days she has had a  discomfort in her right anterolateral upper thigh.  She went to her primary care physician office appointment today and mentioned her symptoms.  She was then sent to the ER at [facility] where they performed a CT angio with runoff of the lower extremity.  It is on that study that they found the occluded right iliac stent.  They consulted with the patient's vascular surgeon who recommended heparin bolus, heparin drip, and emergent transfer here to [facility].  Upon patient arrival I contacted Dr. Kaplan.  He said he she does not need any additional imaging from his standpoint.  He recommends continuing the heparin drip.  He will likely operate tomorrow.  He would like her admitted to the hospital service.  Patient is not septic or toxic.  Her foot is not particularly cold.  Is not cyanotic.  She has a very faint monophasic blood flow distally on Doppler upon my examination.  She has some necrotic toes (tip of the great toe and tip of the second toe) the patient says is actually gotten better after her stent was placed.  I spoke with Dr. Banks, hospitalist on-call, he will kindly evaluate the patient hospitalization.  Of note, the patient mentioned that she has been experiencing a itchy and painful rash to the palms of her hands bilaterally.  It started 2 days ago.  She says she is not sexually active.  She does not have any oral lesions or sores.  No fevers or chills.  I added an RPR to the patient's blood work in the event that this palmar rashes syphilis.  Otherwise, its possible she has a hand-foot-and-mouth type of rash causing her symptoms.  She is otherwise well-appearing and nontoxic.  This rash can be monitored upstairs by the hospitalist service.      3:41 PM - Patient seen and examined at bedside. Discussed plan of care, including labs and admission. Patient agrees to the plan of care. The patient will be medicated with heparin 3,100 unit injection, morphine 4 mg injection, Zofran 4 mg  injection, and heparin infusion 25,000 units in 500 mL 0.45% NaCl. Ordered for labs  to evaluate her symptoms. Pagedonnie Kaplan (Vascular Surgery).    4:07 PM - I discussed the patient's case and the above findings with Dr. Kaplan (Vascular Surgery) who recommends restarting the Heparin drip and admit to the hospitalist. He will operate on the patient tomorrow and states the patient can eat until midnight.  He does not recommend any additional imaging or work-up here in the ER.  Pagedonnie Hospitalist.     4:25 PM - I discussed the patient's case and the above findings with Dr. Banks (Hospitalist) who agrees to evaluate the patient for hospitalization .    ADDITIONAL PROBLEM LIST AND DISPOSITION  Problem #1: Occluded right iliac stent               DISPOSITION AND DISCUSSIONS  I have discussed management of the patient with the following physicians and JEWELL's: Dr. Kaplan (Vascular Surgery) and Dr. Banks (Hospitalist).    Discussion of management with other QHP or appropriate source(s): None     Escalation of care considered, and ultimately not performed: diagnostic imaging.  Considered arterial ultrasound of the right lower extremity, but vascular surgery says they do not need any additional imaging as the CTA with runoff is adequate.    Barriers to care at this time, including but not limited to:  Patient lives in [place] .     Decision tools and prescription drugs considered including, but not limited to:  Patient was started back on heparin drip here in the ER. .    CRITICAL CARE  The very real possibilty of a deterioration of this patient's condition required the highest level of my preparedness for sudden, emergent intervention. I provided critical care services, which included medication orders, frequent reevaluations of the patient's condition and response to treatment, ordering and reviewing test results, and discussing the case with various consultants. The critical care time associated with the care of  the patient was 35 minutes. Review chart for interventions. This time is exclusive of any other billable procedures.      DISPOSITION:  Patient will be hospitalized by Dr. Banks in guarded condition.     FINAL IMPRESSION   1. Acute lower limb ischemia Acute     Total Critical Care Time was 35 minutes, as outlined above.     Carolann GAMBINO (Thaliaiblinnette), am scribing for, and in the presence of, Madie Moreau M.D..    Electronically signed by: Carolann Anders (Robert), 6/6/2023    Madie GAMBINO M.D. personally performed the services described in this documentation, as scribed by Carolann Anders in my presence, and it is both accurate and complete.     The note accurately reflects work and decisions made by me.  Madie Moreau M.D.  6/6/2023  8:04 PM

## 2023-06-07 ENCOUNTER — APPOINTMENT (OUTPATIENT)
Dept: RADIOLOGY | Facility: MEDICAL CENTER | Age: 53
DRG: 253 | End: 2023-06-07
Attending: SURGERY
Payer: COMMERCIAL

## 2023-06-07 LAB — UFH PPP CHRO-ACNC: 0.67 IU/ML

## 2023-06-07 PROCEDURE — 75710 ARTERY X-RAYS ARM/LEG: CPT | Mod: 26,59,RT | Performed by: SURGERY

## 2023-06-07 PROCEDURE — 700111 HCHG RX REV CODE 636 W/ 250 OVERRIDE (IP): Performed by: SURGERY

## 2023-06-07 PROCEDURE — 700102 HCHG RX REV CODE 250 W/ 637 OVERRIDE(OP): Performed by: STUDENT IN AN ORGANIZED HEALTH CARE EDUCATION/TRAINING PROGRAM

## 2023-06-07 PROCEDURE — 047D3DZ DILATION OF LEFT COMMON ILIAC ARTERY WITH INTRALUMINAL DEVICE, PERCUTANEOUS APPROACH: ICD-10-PCS | Performed by: SURGERY

## 2023-06-07 PROCEDURE — 99153 MOD SED SAME PHYS/QHP EA: CPT

## 2023-06-07 PROCEDURE — 36415 COLL VENOUS BLD VENIPUNCTURE: CPT

## 2023-06-07 PROCEDURE — 700111 HCHG RX REV CODE 636 W/ 250 OVERRIDE (IP)

## 2023-06-07 PROCEDURE — 37214 CESSJ THERAPY CATH REMOVAL: CPT | Performed by: SURGERY

## 2023-06-07 PROCEDURE — 99233 SBSQ HOSP IP/OBS HIGH 50: CPT | Performed by: STUDENT IN AN ORGANIZED HEALTH CARE EDUCATION/TRAINING PROGRAM

## 2023-06-07 PROCEDURE — 700111 HCHG RX REV CODE 636 W/ 250 OVERRIDE (IP): Performed by: STUDENT IN AN ORGANIZED HEALTH CARE EDUCATION/TRAINING PROGRAM

## 2023-06-07 PROCEDURE — 97602 WOUND(S) CARE NON-SELECTIVE: CPT

## 2023-06-07 PROCEDURE — 85520 HEPARIN ASSAY: CPT

## 2023-06-07 PROCEDURE — 700102 HCHG RX REV CODE 250 W/ 637 OVERRIDE(OP): Performed by: SURGERY

## 2023-06-07 PROCEDURE — 99222 1ST HOSP IP/OBS MODERATE 55: CPT | Performed by: SURGERY

## 2023-06-07 PROCEDURE — 700117 HCHG RX CONTRAST REV CODE 255: Performed by: SURGERY

## 2023-06-07 PROCEDURE — A9270 NON-COVERED ITEM OR SERVICE: HCPCS | Performed by: STUDENT IN AN ORGANIZED HEALTH CARE EDUCATION/TRAINING PROGRAM

## 2023-06-07 PROCEDURE — 37221 PR REVASCULARIZE ILIAC ARTERY,ANGIOPLASTY/ST*: CPT | Mod: 50 | Performed by: SURGERY

## 2023-06-07 PROCEDURE — 047C3DZ DILATION OF RIGHT COMMON ILIAC ARTERY WITH INTRALUMINAL DEVICE, PERCUTANEOUS APPROACH: ICD-10-PCS | Performed by: SURGERY

## 2023-06-07 PROCEDURE — A9270 NON-COVERED ITEM OR SERVICE: HCPCS | Performed by: SURGERY

## 2023-06-07 PROCEDURE — 770020 HCHG ROOM/CARE - TELE (206)

## 2023-06-07 RX ORDER — CLOPIDOGREL BISULFATE 75 MG/1
75 TABLET ORAL DAILY
Status: DISCONTINUED | OUTPATIENT
Start: 2023-06-08 | End: 2023-06-08 | Stop reason: HOSPADM

## 2023-06-07 RX ORDER — ONDANSETRON 2 MG/ML
4 INJECTION INTRAMUSCULAR; INTRAVENOUS PRN
Status: ACTIVE | OUTPATIENT
Start: 2023-06-07 | End: 2023-06-07

## 2023-06-07 RX ORDER — HEPARIN SODIUM 1000 [USP'U]/ML
INJECTION, SOLUTION INTRAVENOUS; SUBCUTANEOUS
Status: COMPLETED | OUTPATIENT
Start: 2023-06-07 | End: 2023-06-07

## 2023-06-07 RX ORDER — HEPARIN SODIUM 1000 [USP'U]/ML
INJECTION, SOLUTION INTRAVENOUS; SUBCUTANEOUS
Status: DISPENSED
Start: 2023-06-07 | End: 2023-06-07

## 2023-06-07 RX ORDER — MIDAZOLAM HYDROCHLORIDE 1 MG/ML
INJECTION INTRAMUSCULAR; INTRAVENOUS
Status: COMPLETED
Start: 2023-06-07 | End: 2023-06-07

## 2023-06-07 RX ORDER — MIDAZOLAM HYDROCHLORIDE 1 MG/ML
INJECTION INTRAMUSCULAR; INTRAVENOUS
Status: DISPENSED
Start: 2023-06-07 | End: 2023-06-07

## 2023-06-07 RX ORDER — SODIUM CHLORIDE 9 MG/ML
500 INJECTION, SOLUTION INTRAVENOUS
Status: ACTIVE | OUTPATIENT
Start: 2023-06-07 | End: 2023-06-07

## 2023-06-07 RX ORDER — MIDAZOLAM HYDROCHLORIDE 1 MG/ML
.5-2 INJECTION INTRAMUSCULAR; INTRAVENOUS PRN
Status: ACTIVE | OUTPATIENT
Start: 2023-06-07 | End: 2023-06-07

## 2023-06-07 RX ORDER — IODIXANOL 270 MG/ML
70 INJECTION, SOLUTION INTRAVASCULAR ONCE
Status: COMPLETED | OUTPATIENT
Start: 2023-06-07 | End: 2023-06-07

## 2023-06-07 RX ORDER — CLOPIDOGREL BISULFATE 75 MG/1
225 TABLET ORAL ONCE
Status: COMPLETED | OUTPATIENT
Start: 2023-06-07 | End: 2023-06-07

## 2023-06-07 RX ADMIN — ACETAMINOPHEN 650 MG: 325 TABLET, FILM COATED ORAL at 12:01

## 2023-06-07 RX ADMIN — FENTANYL CITRATE 50 MCG: 50 INJECTION, SOLUTION INTRAMUSCULAR; INTRAVENOUS at 09:22

## 2023-06-07 RX ADMIN — FENTANYL CITRATE 50 MCG: 50 INJECTION, SOLUTION INTRAMUSCULAR; INTRAVENOUS at 09:45

## 2023-06-07 RX ADMIN — MIDAZOLAM 1 MG: 1 INJECTION, SOLUTION INTRAMUSCULAR; INTRAVENOUS at 08:54

## 2023-06-07 RX ADMIN — MIDAZOLAM HYDROCHLORIDE 1 MG: 2 INJECTION, SOLUTION INTRAMUSCULAR; INTRAVENOUS at 08:38

## 2023-06-07 RX ADMIN — PREGABALIN 300 MG: 150 CAPSULE ORAL at 05:33

## 2023-06-07 RX ADMIN — ACETAMINOPHEN 650 MG: 325 TABLET, FILM COATED ORAL at 21:19

## 2023-06-07 RX ADMIN — MIDAZOLAM 1 MG: 1 INJECTION, SOLUTION INTRAMUSCULAR; INTRAVENOUS at 09:14

## 2023-06-07 RX ADMIN — ATORVASTATIN CALCIUM 40 MG: 40 TABLET, FILM COATED ORAL at 21:19

## 2023-06-07 RX ADMIN — FENTANYL CITRATE 50 MCG: 50 INJECTION, SOLUTION INTRAMUSCULAR; INTRAVENOUS at 08:54

## 2023-06-07 RX ADMIN — MORPHINE SULFATE 4 MG: 4 INJECTION, SOLUTION INTRAMUSCULAR; INTRAVENOUS at 05:41

## 2023-06-07 RX ADMIN — CLOPIDOGREL BISULFATE 75 MG: 75 TABLET ORAL at 05:33

## 2023-06-07 RX ADMIN — MIDAZOLAM 1 MG: 1 INJECTION, SOLUTION INTRAMUSCULAR; INTRAVENOUS at 09:45

## 2023-06-07 RX ADMIN — HEPARIN SODIUM 5000 UNITS: 1000 INJECTION, SOLUTION INTRAVENOUS; SUBCUTANEOUS at 08:43

## 2023-06-07 RX ADMIN — FENTANYL CITRATE 50 MCG: 50 INJECTION, SOLUTION INTRAMUSCULAR; INTRAVENOUS at 08:38

## 2023-06-07 RX ADMIN — MIDAZOLAM 1 MG: 1 INJECTION, SOLUTION INTRAMUSCULAR; INTRAVENOUS at 08:38

## 2023-06-07 RX ADMIN — FENTANYL CITRATE 50 MCG: 50 INJECTION, SOLUTION INTRAMUSCULAR; INTRAVENOUS at 09:15

## 2023-06-07 RX ADMIN — ACETAMINOPHEN 650 MG: 325 TABLET, FILM COATED ORAL at 05:42

## 2023-06-07 RX ADMIN — MIDAZOLAM 1 MG: 1 INJECTION, SOLUTION INTRAMUSCULAR; INTRAVENOUS at 09:26

## 2023-06-07 RX ADMIN — AMLODIPINE BESYLATE 5 MG: 5 TABLET ORAL at 05:32

## 2023-06-07 RX ADMIN — ASPIRIN 81 MG 81 MG: 81 TABLET ORAL at 05:33

## 2023-06-07 RX ADMIN — IODIXANOL 70 ML: 270 INJECTION, SOLUTION INTRAVASCULAR at 10:30

## 2023-06-07 RX ADMIN — CLOPIDOGREL BISULFATE 225 MG: 75 TABLET ORAL at 12:01

## 2023-06-07 RX ADMIN — MORPHINE SULFATE 4 MG: 4 INJECTION, SOLUTION INTRAMUSCULAR; INTRAVENOUS at 19:47

## 2023-06-07 RX ADMIN — HEPARIN SODIUM 3000 UNITS: 1000 INJECTION, SOLUTION INTRAVENOUS; SUBCUTANEOUS at 09:24

## 2023-06-07 ASSESSMENT — ENCOUNTER SYMPTOMS
CARDIOVASCULAR NEGATIVE: 1
MUSCULOSKELETAL NEGATIVE: 1
EYES NEGATIVE: 1
PSYCHIATRIC NEGATIVE: 1
RESPIRATORY NEGATIVE: 1
GASTROINTESTINAL NEGATIVE: 1
WEAKNESS: 1

## 2023-06-07 ASSESSMENT — PAIN DESCRIPTION - PAIN TYPE
TYPE: ACUTE PAIN
TYPE: CHRONIC PAIN
TYPE: CHRONIC PAIN
TYPE: ACUTE PAIN
TYPE: CHRONIC PAIN
TYPE: ACUTE PAIN
TYPE: CHRONIC PAIN
TYPE: ACUTE PAIN

## 2023-06-07 ASSESSMENT — LIFESTYLE VARIABLES
TOTAL SCORE: 0
EVER FELT BAD OR GUILTY ABOUT YOUR DRINKING: NO
HAVE PEOPLE ANNOYED YOU BY CRITICIZING YOUR DRINKING: NO
HAVE YOU EVER FELT YOU SHOULD CUT DOWN ON YOUR DRINKING: NO
EVER HAD A DRINK FIRST THING IN THE MORNING TO STEADY YOUR NERVES TO GET RID OF A HANGOVER: NO
DOES PATIENT WANT TO STOP DRINKING: NO
TOTAL SCORE: 0
TOTAL SCORE: 0
ALCOHOL_USE: NO
CONSUMPTION TOTAL: INCOMPLETE

## 2023-06-07 ASSESSMENT — FIBROSIS 4 INDEX: FIB4 SCORE: 0.98

## 2023-06-07 NOTE — PROGRESS NOTES
4 Eyes Skin Assessment Completed by ORLANDO Salgado and KAMINI Camejo.    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)  (L) Arm/Elbow/Hand Redness and Blanching (cirrhosis on palm)  Abdomen Redness  Groin WDL  Scrotum/Coccyx/Buttocks Redness and Blanching  (R) Leg Redness and Edema (cirrhosis, scaly, painful)  (L) Leg Redness and Edema (cirrhosis, scaly, painful)  (R) Heel/Foot/Toe Redness and Blanching (black, necrotic R big toe and black, necrotic R second toe)  (L) Heel/Foot/Toe Redness and Blanching          Devices In Places Tele Box, Blood Pressure Cuff, and Pulse Ox      Interventions In Place Pillows    Possible Skin Injury Yes    Pictures Uploaded Into Epic Yes  Wound Consult Placed Yes  RN Wound Prevention Protocol Ordered Yes

## 2023-06-07 NOTE — CARE PLAN
The patient is Stable - Low risk of patient condition declining or worsening    Shift Goals  Clinical Goals: Pain control, heparin drip  Patient Goals: Rest, comfort  Family Goals: WILDER    Progress made toward(s) clinical / shift goals:    Problem: Knowledge Deficit - Standard  Goal: Patient and family/care givers will demonstrate understanding of plan of care, disease process/condition, diagnostic tests and medications  Outcome: Progressing     Problem: Pain - Standard  Goal: Alleviation of pain or a reduction in pain to the patient’s comfort goal  Outcome: Progressing     Problem: Fall Risk  Goal: Patient will remain free from falls  Outcome: Progressing     Problem: Hemodynamics  Goal: Patient's hemodynamics, fluid balance and neurologic status will be stable or improve  Outcome: Progressing

## 2023-06-07 NOTE — PROGRESS NOTES
Pt back from IR to same room T720. Pt educated on immediate bed rest following procedure until noon per MD Kaplan. This RN able to doppler post tibial pulse, but unable to doppler pedal pulse in right foot. Foot warm and pink. MD Kaplan aware.

## 2023-06-07 NOTE — PROGRESS NOTES
Hospital Medicine Daily Progress Note    Date of Service  6/7/2023    Chief Complaint  Lorene Woodard is a 52 y.o. female admitted 6/6/2023 with painful right limb lower    Hospital Course  Patient has a history of COPD not on oxygen, tobacco abuse, hypertension, hyperlipidemia.  She initially presented for Right lower extremity cellulitis that was treated with antibiotics.  Vascular surgery consulted, patient had stent placement of right common iliac artery and was placed on dual antiplatelet therapy with aspirin and Plavix.     Since patient has been home, she states she has been compliant with her medications, including aspirin and Plavix.  For the last week and a half, she reported a progressively worsening right hip pain.  She then had a follow-up appointment today and was found to have a reoccluded iliac stent, and was recommended to come into the ED for evaluation.     In the ED, patient found to have normal vital signs.  CT angiogram prior to arrival shows patient has a right iliac stent that has reoccluded.    Interval Problem Update  6/7/2023:  Patient underwent right leg perfusion and angiogram with success.  Right foot has excellent perfusion.  Appreciate vascular surgery recommendations.  Patient will continue on aspirin addition of Plavix daily indefinitely.  Patient will follow-up with vascular surgery in 2 to 4 weeks for progress check.  We will monitor patient over the course of the evening intent on discharge on 6/8/2023.  Patient offers no acute complaints at this time.    I have discussed this patient's plan of care and discharge plan at IDT rounds today with Case Management, Nursing, Nursing leadership, and other members of the IDT team.    Consultants/Specialty  vascular surgery    Code Status  Full Code    Disposition  The patient is not medically cleared for discharge to home or a post-acute facility.  Anticipate discharge to: home with close outpatient follow-up    I have placed the  appropriate orders for post-discharge needs.    Review of Systems  Review of Systems   Constitutional:  Positive for malaise/fatigue.   HENT: Negative.     Eyes: Negative.    Respiratory: Negative.     Cardiovascular: Negative.    Gastrointestinal: Negative.    Genitourinary: Negative.    Musculoskeletal: Negative.    Skin: Negative.    Neurological:  Positive for weakness.   Endo/Heme/Allergies: Negative.    Psychiatric/Behavioral: Negative.          Physical Exam  Temp:  [36.1 °C (97 °F)-36.5 °C (97.7 °F)] 36.5 °C (97.7 °F)  Pulse:  [48-74] 64  Resp:  [10-20] 16  BP: (105-168)/() 125/78  SpO2:  [94 %-99 %] 97 %    Physical Exam  Constitutional:       Appearance: Normal appearance. She is obese.   HENT:      Head: Normocephalic.      Nose: Nose normal.      Mouth/Throat:      Mouth: Mucous membranes are moist.   Eyes:      Pupils: Pupils are equal, round, and reactive to light.   Cardiovascular:      Rate and Rhythm: Normal rate and regular rhythm.      Pulses: Normal pulses.   Pulmonary:      Effort: Pulmonary effort is normal.      Breath sounds: Normal breath sounds.   Abdominal:      General: Abdomen is flat. Bowel sounds are normal.      Palpations: Abdomen is soft.   Musculoskeletal:      Cervical back: Neck supple.      Comments: Bilateral lower extremities have chronic venous changes  R foot warm to touch, feeble dorsalis pedis pulse palpated  L foot warm to touch, DP 2+  R first toe and second toe have small area of necrosis      Skin:     General: Skin is warm.   Neurological:      General: No focal deficit present.      Mental Status: She is alert and oriented to person, place, and time. Mental status is at baseline.   Psychiatric:         Mood and Affect: Mood normal.         Behavior: Behavior normal.         Thought Content: Thought content normal.         Judgment: Judgment normal.         Fluids    Intake/Output Summary (Last 24 hours) at 6/7/2023 6228  Last data filed at 6/7/2023 2376  Gross  per 24 hour   Intake 386.45 ml   Output --   Net 386.45 ml       Laboratory  Recent Labs     06/06/23  1515   WBC 7.4   RBC 4.25   HEMOGLOBIN 12.0   HEMATOCRIT 36.4*   MCV 85.6   MCH 28.2   MCHC 33.0   RDW 46.3   PLATELETCT 213   MPV 9.7     Recent Labs     06/06/23  1515   SODIUM 143   POTASSIUM 4.1   CHLORIDE 109   CO2 23   GLUCOSE 93   BUN 13   CREATININE 0.82   CALCIUM 8.5     Recent Labs     06/06/23  1515   APTT >240.0*   INR 1.21*               Imaging  OUTSIDE IMAGES-CT ABDOMEN /PELVIS   Final Result      IR-EXTREMITY ANGIOGRAM-UNILATERAL RIGHT    (Results Pending)        Assessment/Plan  * Acute lower limb ischemia- (present on admission)  Assessment & Plan  Spoke with ERP, who spoke with vascular surgeon.  Patient initially had a stent placement on her right common iliac artery on April 24.  She has been on aspirin and Plavix since then.  She reports a progressively worsening pain for the last week and a half in her right hip and CT angiogram prior to arrival shows a reoccluded stent, which is likely the culprit for her symptoms. The tips of her right first and second toe show necrosis but this has been chronic and non changing for the past few months. Patient started on heparin drip as recommended by vascular surgeon, and will be taken to the OR in a.m.  Monitor closely overnight for worsening pain in her right limb, bleeding, hemorrhagic shock from heparin.  6/7/2023:  Patient status post right lower extremity angiogram with resulting excellent perfusion in her right lower limb.  Appreciate vascular surgery recommendations.  Patient continue on aspirin addition of Plavix indefinitely.  Patient will have 2 to 4-week follow-up with vascular surgery.    Tobacco abuse  Assessment & Plan  I counseled the patient for 4 minutes regarding smoking cessation using methods such as nicotine replacement therapy with patches and gum and medications such as Wellbutrin or Chantix.  I also discussed with patient  breathing techniques and meditation as well as regular physical activity, which will assist patient with smoking cessation      Hyperlipidemia  Assessment & Plan  Continue home dose of Lipitor    Hypertension  Assessment & Plan  Restart home medication       Greater than 51 minutes spent prepping to see patient (e.g. review of tests) obtaining and/or reviewing separately obtained history. Performing a medically appropriate examination and/ evaluation.  Counseling and educating the patient/family/caregiver.  Ordering medications, tests, or procedures.  Referring and communicating with other health care professionals.  Documenting clinical information in EPIC.  Independently interpreting results and communicating results to patient/family/caregiver.  Care coordination.    Please note that this dictation was created using voice recognition software. I have made every reasonable attempt to correct obvious errors, but I expect that there are errors of grammar and possibly context that I did not discover before finalizing the note.    VTE prophylaxis: SCDs/TEDs    I have performed a physical exam and reviewed and updated ROS and Plan today (6/7/2023). In review of yesterday's note (6/6/2023), there are no changes except as documented above.

## 2023-06-07 NOTE — ED NOTES
Spoke with pharmacist and MD Banks regarding critical ptt & heparin Xa. Due to transferring and holding drip when pt arrived and changing to adjusted weight, RN instructed by MD to keep Heparin gtt at current rate and re-check Xa in 6 hours per protocol and makes changes based on that result. Will inform T7 RN in report.

## 2023-06-07 NOTE — PROGRESS NOTES
Pt presents to IR 1. Pt was consented by MD at bedside, confirmed by this RN and consent at bedside. Patient underwent a right lower extremity angiogram with stent placement in the left common iliac and extension stents to existing stents  by Dr. Kaplan. Site marked and initialed by MD prior to procedure starting and verified by patient and procedure team. Posterior tibial prior to case Right (doppler), and posterior tibial left (doppler). Patient was placed in a supine position. Right femoral artery and left femoral artery was accessed. Vitals were taken every 5 minutes and remained stable during procedure (see doc flow sheet for results). CO2 waveform capnography was monitored and remained WNL throughout procedure. angioseal was used to achieve hemostasis.       Report called to Ashley PERRY. Pt transported via gurney with RN to T720 in a stable condition.    Omnilink Elite  Vascular Balloon-Expandable Stent System  8 mm x 39 mm x 80 cm  REF: [id number]  LOT: [id number]  Exp: 06.30.25     Omnilink Elite  Vascular Balloon-Expadable Stent Placement  7 mm x 59 mm x 80 cm  REF: [id number]  LOT: [id number]  Exp: 05.31.25     Omnilink Elite  Vascular Balloon-Expandable Stent System  7 mm x 39 mm x 80 cm  REF: [id number]  LOT: [id number]  Exp: 10.31.2023     Closure Device  AngioSeal VIP (right femoral artery)  6 F  Vascular Closure Device  REF: [id number]  LOT: [id number]  EXP: 10.31.2023    AngioSeal VIP (left femoral artery)  6 F  REF: [id number]  LOT: [id number]  EXP: 08.31.2023    Posterior tibial post doppler bilaterally

## 2023-06-07 NOTE — ED NOTES
Pt transported to floor by RN on zoll monitor with chart and all belongings in stable condition. RN taking patient will call for report

## 2023-06-07 NOTE — CONSULTS
Vascular Surgery          New Patient Consultation    Patient:Lorene Woodard  MRN:[id number]    Date: 6/7/2023    Referring Provider: Robin Torres M.d.    Consulting Physician: Marlon Kaplan MD  _____________________________________________________    Reason for consultation:  PAOD and occluded R JOSEPH stent with nonhealing right toe wound    HPI:  This is a 52 y.o. female with a known history of PAOD and a nonhealing wound of her right first and second toe.  She underwent a right common iliac artery stent placement on 4/26/2023 which successfully revascularize the right lower extremity and she has one-vessel runoff via the posterior tibial artery.  She was doing well until a few days ago when her right leg started hurting again and she went to the ER for evaluation and work-up showed evidence that the right iliac stent had reoccluded.  Patient was transferred here for further work-up and intervention.  She is currently alert and conversant in no distress.  She still has ulcerations of toes #1 and 2 on the right foot however no gross evidence of infection.    Past Medical History:   Diagnosis Date    CAD (coronary artery disease) December 2012    NSTEMI. Coronary angiogram with 60% LAD stenosis, 40% stenosis proximal LAD, 20% stenosis mid circumflex.    Depression     Hyperlipidemia     Hypertension     Borderline    Psoriasis     Shortness of breath February 2014    Echocardiogram with normal LV size, LVEF 60-65%, no valvular abnormalities.    Smoking        History reviewed. No pertinent surgical history.    Current Facility-Administered Medications   Medication Dose Route Frequency Provider Last Rate Last Admin    heparin infusion 25,000 units in 500 mL 0.45% NACL  0-30 Units/kg/hr (Adjusted) Intravenous Continuous Madie Moreau M.D. 28 mL/hr at 06/07/23 0727 18 Units/kg/hr at 06/07/23 0727    heparin injection 3,100 Units  40 Units/kg (Adjusted) Intravenous PRN Madie Moreau M.D.         amLODIPine (NORVASC) tablet 5 mg  5 mg Oral DAILY Higinio Banks M.D.   5 mg at 06/07/23 0532    atorvastatin (LIPITOR) tablet 40 mg  40 mg Oral DAILY Higinio Banks M.D.   40 mg at 06/06/23 2048    clopidogrel (PLAVIX) tablet 75 mg  75 mg Oral DAILY Higinio Banks M.D.   75 mg at 06/07/23 0533    aspirin (ASA) chewable tab 81 mg  81 mg Oral DAILY Higinio Banks M.D.   81 mg at 06/07/23 0533    metoprolol SR (TOPROL XL) tablet 100 mg  100 mg Oral DAILY Higinio Banks M.D.   100 mg at 06/06/23 1851    pregabalin (LYRICA) capsule 300 mg  300 mg Oral DAILY Higinio Banks M.D.   300 mg at 06/07/23 0533    acetaminophen (Tylenol) tablet 650 mg  650 mg Oral Q6HRS PRN Higinio Banks M.D.   650 mg at 06/07/23 0542    morphine 4 MG/ML injection 4 mg  4 mg Intravenous Q4HRS PRN Higinio Banks M.D.   4 mg at 06/07/23 0541       Social History     Socioeconomic History    Marital status: Single     Spouse name: Not on file    Number of children: Not on file    Years of education: Not on file    Highest education level: Not on file   Occupational History    Not on file   Tobacco Use    Smoking status: Former     Packs/day: 0.00     Types: Cigarettes    Smokeless tobacco: Never   Substance and Sexual Activity    Alcohol use: Yes     Comment: rarely    Drug use: Not Currently    Sexual activity: Not on file   Other Topics Concern    Not on file   Social History Narrative    Not on file     Social Determinants of Health     Financial Resource Strain: Not on file   Food Insecurity: Not on file   Transportation Needs: Not on file   Physical Activity: Not on file   Stress: Not on file   Social Connections: Not on file   Intimate Partner Violence: Not on file   Housing Stability: Not on file       Family History   Problem Relation Age of Onset    Heart Disease Sister        Allergies:  Patient has no known allergies.    Review of Systems:    Constitutional: Negative for fever or chills  HENT:  "  Negative for hearing loss or tinnitus    Eyes:    Negative for blurred vision or loss of vision  Respiratory:  Negative for cough or hemoptysis  Cardiac:  Negative for chest pain or palpitations  Vascular:  Negative for claudication, Positive for rest pain  Gastrointestinal: Negative for vomiting or abdominal pain     Negative for hematochezia or melena   Genitourinary: Negative for dysuria or hematuria   Musculoskeletal: Negative for myalgias or acute joint pain  Skin:   Negative for itching or rash  Neurological:  Negative for dizziness or headaches     Negative for speech disturbance     Negative for extremity weakness or paresthesias  Endo/Heme:  Negative for easy bruising or bleeding    Physical Exam:  /71   Pulse (!) 53   Temp 36.5 °C (97.7 °F) (Temporal)   Resp 18   Ht 1.702 m (5' 7\")   Wt 102 kg (224 lb 13.9 oz)   LMP 03/13/2017   SpO2 94%   BMI 35.22 kg/m²     Constitutional: Alert, oriented, no acute distress  HEENT:  Normocephalic and atraumatic, EOMI  Neck:   Supple, no JVD,   Cardiovascular: Regular rate and rhythm,   Pulmonary:  Good air entry bilaterally,    Abdominal:  Soft, non-tender, non-distended       Musculoskeletal: No tenderness, no deformity  Neurological:  CN II-XII grossly intact, no focal deficits  Skin:   Skin is warm and dry. No rash noted.  Vascular exam:    RUE: warm, cap refill<3 seconds, no edema, no tissue loss,   LUE: warm, cap refill<3 seconds, no edema, no tissue loss,   RLE: warm, cap refill<3 seconds, no edema, ischemic ulcerations of the tips of toes #1 and 2 with no gross evidence of infection  LLE: warm, cap refill<3 seconds, no edema, no tissue loss, Palpable DP pulse      Labs:  Recent Labs     06/06/23  1515   WBC 7.4   RBC 4.25   HEMOGLOBIN 12.0   HEMATOCRIT 36.4*   MCV 85.6   MCH 28.2   MCHC 33.0   RDW 46.3   PLATELETCT 213   MPV 9.7     Recent Labs     06/06/23  1515   SODIUM 143   POTASSIUM 4.1   CHLORIDE 109   CO2 23   GLUCOSE 93   BUN 13 "   CREATININE 0.82   CALCIUM 8.5     Recent Labs     06/06/23  1515   APTT >240.0*   INR 1.21*     Recent Labs     06/06/23  1515   ASTSGOT 22   ALTSGPT 29   TBILIRUBIN 0.3   ALKPHOSPHAT 189*   GLOBULIN 3.4   INR 1.21*         Radiology:  Noninvasive arterial work-up at outside hospital indicates that the right common iliac artery stent has reoccluded      Assessment/Plan:   -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  We will get a CT angiogram to further evaluate and see if there is an anatomic explanation.  This will also help plan subsequent intervention.  Most likely the patient will need restenting and most likely bilateral common iliac artery stents in a kissing fashion to extend the proximal margin of the stent up into the infrarenal aorta.  Further recommendations based on the results of the CT angiogram    Marlon Kaplan MD  [facility]   Voalte preferred or call my office [phone]  __________________________________________________________________  Patient:Lorene Woodard   MRN:[id number]   CSN:[id number]

## 2023-06-07 NOTE — THERAPY
Occupational Therapy Contact Note    Patient Name: Lorene Woodard  Age:  52 y.o., Sex:  female  Medical Record #: [id number]  Today's Date: 6/7/2023    Pt off floor for stent placement, will hold OT eval and follow up as appropriate.      Bhargav Gupta OTD, OTR/L

## 2023-06-07 NOTE — WOUND TEAM
[facility]  Inpatient Services  Initial Wound and Skin Care Evaluation    Admission Date: 6/6/2023     Last order of IP CONSULT TO WOUND CARE was found on 6/6/2023 from Hospital Encounter on 6/6/2023     HPI, PMH, SH: Reviewed    History reviewed. No pertinent surgical history.  Social History     Tobacco Use    Smoking status: Former     Packs/day: 0.00     Types: Cigarettes    Smokeless tobacco: Never   Substance Use Topics    Alcohol use: Yes     Comment: rarely     Chief Complaint   Patient presents with    Sent by MD     Pt BIB [facility] as a transfer from [facility]. Pt had Dr. White today in [place] for R foot. States R foot has been falling asleep often and reports it gets very cold. Seen in ED there, dx with occulusion of iliac stents which she had placed in April. Per reports, no pulses able to be obtained on R foot, however on arrival, R DPP palpated.      Diagnosis: Acute lower limb ischemia [I99.8]    Unit where seen by Wound Team: T720/00     WOUND CONSULT/FOLLOW UP RELATED TO:  right 1st and 2nd toes      WOUND HISTORY:   6/6/23:   Agustin Woodard is a 52 y.o. female who presented 6/6/2023 with right hip pain for the last week and a half.     Patient has a history of COPD not on oxygen, tobacco abuse, hypertension, hyperlipidemia.  She initially presented for Right lower extremity cellulitis that was treated with antibiotics.  Vascular surgery consulted, patient had stent placement of right common iliac artery and was placed on dual antiplatelet therapy with aspirin and Plavix.     Since patient has been home, she states she has been compliant with her medications, including aspirin and Plavix.  For the last week and a half, she reported a progressively worsening right hip pain.  She then had a follow-up appointment today and was found to have a reoccluded iliac stent, and was recommended to come into the ED for evaluation.     In the ED, patient found to have normal vital  signs.  CT angiogram prior to arrival shows patient has a right iliac stent that has reoccluded.'    6/7/23 - MD Kaplan:   Angiogram complete  Right leg perfusion successfully restored and the right foot has excellent perfusion at this time  No further need for heparin  ASA 81mg daily and Plavix 75mg daily indefinitely  Follow-up 2-4 weeks for progress check    WOUND ASSESSMENT/LDA  Wound 04/22/23 Full Thickness Wound Abdomen;Pannus Right (Active)   Number of days: 46       Wound 04/22/23 Soft Tissue Necrosis Toe, Hallux;Toe, 2nd Right Necrotic right toes 1 and 2 (Active)   Wound Image   06/07/23 1200   Site Assessment Dry;Black;Eschar;Closed Wound Edges 06/07/23 1200   Periwound Assessment Dry;Intact;Normal Temperature 06/07/23 1200   Margins Defined edges;Attached edges 06/07/23 1200   Closure Open to air 06/07/23 1200   Drainage Amount None 06/07/23 1200   Treatments Cleansed;Offloading;Site care 06/07/23 1200   Wound Cleansing Approved Wound Cleanser 06/07/23 1200   Dressing Cleansing/Solutions 3% Betadine 06/07/23 1200   Dressing Options Open to Air 06/07/23 1200   Dressing Change/Treatment Frequency Every Shift, and As Needed 06/07/23 1200   NEXT Weekly Photo (Inpatient Only) 06/14/23 06/07/23 1200   Non-staged Wound Description Full thickness 06/07/23 1200   Wound Length (cm) 2.5 cm 06/07/23 1200   Wound Width (cm) 3.5 cm 06/07/23 1200   Wound Surface Area (cm^2) 8.75 cm^2 06/07/23 1200   Wound Bed Eschar (%) 100 % 06/07/23 1200   Shape irregular 06/07/23 1200   Wound Odor None 06/07/23 1200   Exposed Structures WILDER 06/07/23 1200   WOUND NURSE ONLY - Time Spent with Patient (mins) 30 06/07/23 1200   Number of days: 46        Vascular:  See IR results 6/7/23  BENI:   No results found.    Lab Values:    Lab Results   Component Value Date/Time    WBC 7.4 06/06/2023 03:15 PM    RBC 4.25 06/06/2023 03:15 PM    HEMOGLOBIN 12.0 06/06/2023 03:15 PM    HEMATOCRIT 36.4 (L) 06/06/2023 03:15 PM    CREACTPROT 9.14 (H)  04/22/2023 04:39 PM    SEDRATEWES 74 (H) 04/22/2023 04:39 PM    HBA1C 5.6 04/22/2023 04:39 PM        Culture Results show:  No results found for this or any previous visit (from the past 720 hour(s)).    Pain Level/Medicated:  None, Tolerated without pain medication       INTERVENTIONS BY WOUND TEAM:  Chart and images reviewed. Discussed with bedside RN. All areas of concern (based on picture review, LDA review and discussion with bedside RN) have been thoroughly assessed. Documentation of areas based on significant findings. This RN in to assess patient. Performed standard wound care which includes appropriate positioning, dressing removal and non-selective debridement. Pictures and measurements obtained weekly if/when required.  Preparation for Dressing removal: Dressing soaked with Open to air  Non-selectively Debrided with:  Wound cleanser   Sharp debridement: no  Priya wound: Cleansed with Wound cleanser, Prepped with  betadine  Primary Dressing: Open to Air    Advanced Wound Care Discharge Planning  Number of Clinicians necessary to complete wound care: 1  Is patient requiring IV pain medications for dressing changes: no  Length of time for dressing change 20 min. (This does not include chart review, pre-medication time, set up, clean up or time spent charting.)    Interdisciplinary consultation: Patient, Bedside RN (Ashley)    EVALUATION / RATIONALE FOR TREATMENT:  Most Recent Date:  6/7/23: Patient with 100% intact black eschar to tips of 1st and 2nd toes. Patient states that she has been using betadine at home and has noticed an improvement to the wounds. Angiogram performed by MD Kaplan today to restore blood flow to foot. Betadine painted to intact eschar to keep area clean, dry and intact and reduce the risk of infection.      Goals: Steady decrease in wound area and depth weekly.    WOUND TEAM PLAN OF CARE ([X] for frequency of wound follow up,):   Nursing to follow dressing orders written for wound  care. Contact wound team if area fails to progress, deteriorates or with any questions/concerns if something comes up before next scheduled follow up (See below as to whether wound is following and frequency of wound follow up)  Dressing changes by wound team:                   Follow up 3 times weekly:                NPWT change 3 times weekly:     Follow up 1-2 times weekly:    X  Follow up Bi-Monthly:           Follow up Monthly (High Risk):                        Follow up as needed:     Other (explain):     NURSING PLAN OF CARE ORDERS (X):  Dressing changes: See Dressing Care orders: X  Skin care: See Skin Care orders: X  RN Prevention Protocol:   Rectal tube care: See Rectal Tube Care orders:   Other orders:    RSKIN:   CURRENTLY IN PLACE (X), APPLIED THIS VISIT (A), ORDERED (O):   Q shift Alfredo:  X  Q shift pressure point assessments:  X    Surface/Positioning   Standard Mattress/Trauma Bed   X        Low Airloss          ICU Low Airloss   Bariatric SILVINO     Waffle cushion        Waffle Overlay          Reposition q 2 hours      TAPs Turning system     Z Francesco Pillow     Offloading/Redistribution X  Sacral Offloading Dressing (Silicone dressing)     Heel Offloading Dressing (Silicone dressing)         Heel float boots (Prevalon boot)             Float Heels off Bed with Pillows    X       Respiratory   Silicone O2 tubing         Gray Foam Ear protectors     Cannula fixation Device (Tender )          High flow offloading Clip    Elastic head band offloading device      Anchorfast                                                         Trach with Optifoam split foam             Containment/Moisture Prevention - continent    Rectal tube or BMS    Purwick/Condom Cath        Li Catheter    Barrier wipes           Barrier paste       Antifungal tx      Interdry        Mobilization      Up to chair        Ambulate      PT/OT      Nutrition       Dietician        Diabetes Education      PO     TF     TPN     NPO    # days     Anticipated discharge plans: X  LTACH:        SNF/Rehab:                  Home Health Care:           Outpatient Wound Center:            Self/Family Care:   X     Other:                  Vac Discharge Needs:   Vac Discharge plan is purely a recommendation from wound team and not a requirement for discharge unless otherwise stated by physician.  Not Applicable Pt not on a wound vac:    X   Regular Vac while inpatient, alternative dressing at DC:        Regular Vac in use and continued at DC:            Reg. Vac w/ Skin Sub/Biologic in use. Will need to be changed 2x wkly:      Veraflo Vac while inpatient, ok to transition to Regular Vac on Discharge (Bedside RN to Clamp small instillation tubing at time of DC):           Veraflo Vac while inpatient, would benefit from remaining on Veraflo Vac upon discharge:

## 2023-06-07 NOTE — OP REPORT
VASCULAR SURGERY SERVICE  Operative Note  ___________________________________    Date: 2023    Patient: Lorene Woodard  : 1970  MRN: [id number]  ___________________________________      Preoperative Diagnosis:  -  PAOD with nonhealing right great toe ulceration    Postoperative Diagnosis:  -  PAOD with nonhealing right great toe ulceration    Procedure:  -  Percutaneous access of the bilateral common femoral arteries with ultrasound guidance (88192-31)  -  Placement of bilateral common iliac artery stents (17868-26)  -  Percutaneous closure of the bilateral common femoral arteries with 6 Divehi angioseal closure devices which deployed successfully ()      -------------------------------------------------------------------------------------------------    Surgeon:                                 Marlon Kaplan MD    Assistant:   None    Anesthesia:                             Sedation plus local anesthetic    EBL:                                        minimal    Findings:     - 7x59 and 7x39mm BES in right common iliac extending from infrarenal aorta into proximal external iliac artery, total length of approximately 8cm treated  8x39mm BES deployed in left common iliac artery    - Right lower extremity runoff looks good, there is chronic AT and Peroneal disease but the PT is large with no stenosis and the right foot is well perfused. On angiogram the perfusion does appear to be adequate to heal the great toe and second toe ulcerations    Complications:                        none    Disposition:                             Tolerated well, sent to recovery in stable condition    -----------------------------------------------------------------------------------------------------    History:  Lorene Woodard is a 52 y.o. female who recently underwent a placement of a right common iliac artery stent due to a chronic right common iliac artery occlusion and the presence of nonhealing right first toe  and second toe ulcerations.  This initial stent was placed on 4/26/2023.  For an unknown reason the right common iliac artery stent reoccluded a few days ago and the patient had return of pain and numbness in the right foot and vascular work-up confirmed the reocclusion.  Patient is being brought back to the procedure room today to cannulized the existing stent, I expect this will require kissing common iliac artery stents to extend the treatment length proximally and distally and improve stent patency.  I explained the details of the operation, alternatives, and potential risks, including but not limited to bleeding, infection, and risks of anesthesia.  All questions were answered. Patient understands and agrees to proceed.      Procedure Summary:  The patient was properly identified in the preoperative area, taken to the operating room, and placed in a supine position and IV sedation was given to make patient comfortable.  The patient was prepped and draped in the usual sterile fashion.  Surgical timeout was called to identify the correct patient, procedure, and equipment.  Everyone was in agreement.    Local anesthetic was infiltrated and then we accessed the right common femoral artery percutaneously with ultrasound guidance and dilated this up to a 6 Maltese sheath.  Patient was systemically heparinized and this was maintained throughout the procedure.  Set about using variety of wires and catheters and attempt to cross the existing occluded right common iliac artery stent and was not successful.  I therefore accessed the left common femoral artery percutaneously with ultrasound guidance and placed a 6 Maltese sheath.  I then used a Glidewire and Omni Flush catheter attempt to come up over the aortic bifurcation and cross the occluded iliac stent antegrade and this was also unsuccessful.  I once again tried from a retrograde approach using a baby J-wire and this time I was able to cross through the occluded stent  up into the aorta.  Replacement was interrogated to verify that I was through the lumen of the stent and not through any of the interstices.  It appeared that the stent would need to be treated with kissing common iliac artery stents.   I placed a 7x59 and 7x39mm BES in right common iliac extending from infrarenal aorta into proximal external iliac artery for a total length of approximately 8cm treated.  On the left side I placed a 8x39mm BES deployed in left common iliac artery to create kissing stents at the aortic bifurcation.  Completion angiogram showed excellent patency of the stents and no indication of any issues with perfusion through the stents down into the lower extremities.  The right lower extremity runoff looks good; there is chronic AT and Peroneal disease but the PT is large with no stenosis and the right foot is well perfused. On angiogram the perfusion does appear to be adequate to heal the great toe and second toe ulcerations    Sheaths were removed and the sites were closed with 6 Czech Angio-Seal closure devices which deployed successfully.  Dry sterile bandages were placed.  Patient tolerated the well and was sent to recovery in stable condition.      Marlon Kaplan MD  [facility]  [phone]

## 2023-06-07 NOTE — PROGRESS NOTES
MD Kaplan at bedside. Pt transported down to IR with MD Kaplan in tele bed, Pt ok to be off monitor per MD Kaplan. VSS, Aox4.

## 2023-06-07 NOTE — HOSPITAL COURSE
Patient has a history of COPD not on oxygen, tobacco abuse, hypertension, hyperlipidemia.  She initially presented for Right lower extremity cellulitis that was treated with antibiotics.  Vascular surgery consulted, patient had stent placement of right common iliac artery and was placed on dual antiplatelet therapy with aspirin and Plavix.     Since patient has been home, she states she has been compliant with her medications, including aspirin and Plavix.  For the last week and a half, she reported a progressively worsening right hip pain.  She then had a follow-up appointment today and was found to have a reoccluded iliac stent, and was recommended to come into the ED for evaluation.     In the ED, patient found to have normal vital signs.  CT angiogram prior to arrival shows patient has a right iliac stent that has reoccluded.

## 2023-06-07 NOTE — PROGRESS NOTES
VASCULAR SURGERY SERVICE                        Progress Note  _________________________________    Angiogram complete  Right leg perfusion successfully restored and the right foot has excellent perfusion at this time  No further need for heparin  ASA 81mg daily and Plavix 75mg daily indefinitely  Follow-up 2-4 weeks for progress check    Marlon Kaplan MD  [facility]   [facility] preferred or call my office [phone]  __________________________________________________  Patient:Virginia Vance   MRN:[id number]

## 2023-06-07 NOTE — CARE PLAN
The patient is Stable - Low risk of patient condition declining or worsening    Shift Goals  Clinical Goals: Pain control, heparin drip  Patient Goals: Rest, comfort  Family Goals: WILDER    Progress made toward(s) clinical / shift goals:      Problem: Knowledge Deficit - Standard  Goal: Patient and family/care givers will demonstrate understanding of plan of care, disease process/condition, diagnostic tests and medications  Outcome: Progressing, patient demonstrates understanding of care plan     Problem: Pain - Standard  Goal: Alleviation of pain or a reduction in pain to the patient’s comfort goal  Outcome: Progressing, patient notes reduction in pain with rest and medication administration     Problem: Fall Risk  Goal: Patient will remain free from falls  Outcome: Progressing, patient remains free from falls and is a low fall risk. Patient is stand by assist due to pain in legs     Problem: Skin Integrity  Goal: Skin integrity is maintained or improved  Outcome: Progressing, skin integrity maintained. Wound consult placed for necrotic toes on R foot. Pulses found on R foot via doppler       Patient is not progressing towards the following goals:

## 2023-06-08 ENCOUNTER — PHARMACY VISIT (OUTPATIENT)
Dept: PHARMACY | Facility: MEDICAL CENTER | Age: 53
End: 2023-06-08
Payer: COMMERCIAL

## 2023-06-08 VITALS
RESPIRATION RATE: 15 BRPM | TEMPERATURE: 98.1 F | OXYGEN SATURATION: 98 % | WEIGHT: 236.99 LBS | BODY MASS INDEX: 37.2 KG/M2 | HEART RATE: 65 BPM | HEIGHT: 67 IN | SYSTOLIC BLOOD PRESSURE: 143 MMHG | DIASTOLIC BLOOD PRESSURE: 81 MMHG

## 2023-06-08 PROCEDURE — 700111 HCHG RX REV CODE 636 W/ 250 OVERRIDE (IP): Performed by: STUDENT IN AN ORGANIZED HEALTH CARE EDUCATION/TRAINING PROGRAM

## 2023-06-08 PROCEDURE — 700102 HCHG RX REV CODE 250 W/ 637 OVERRIDE(OP): Performed by: SURGERY

## 2023-06-08 PROCEDURE — A9270 NON-COVERED ITEM OR SERVICE: HCPCS | Performed by: SURGERY

## 2023-06-08 PROCEDURE — 700102 HCHG RX REV CODE 250 W/ 637 OVERRIDE(OP): Performed by: STUDENT IN AN ORGANIZED HEALTH CARE EDUCATION/TRAINING PROGRAM

## 2023-06-08 PROCEDURE — 97165 OT EVAL LOW COMPLEX 30 MIN: CPT

## 2023-06-08 PROCEDURE — RXMED WILLOW AMBULATORY MEDICATION CHARGE: Performed by: STUDENT IN AN ORGANIZED HEALTH CARE EDUCATION/TRAINING PROGRAM

## 2023-06-08 PROCEDURE — 700101 HCHG RX REV CODE 250: Performed by: STUDENT IN AN ORGANIZED HEALTH CARE EDUCATION/TRAINING PROGRAM

## 2023-06-08 PROCEDURE — A9270 NON-COVERED ITEM OR SERVICE: HCPCS | Performed by: STUDENT IN AN ORGANIZED HEALTH CARE EDUCATION/TRAINING PROGRAM

## 2023-06-08 PROCEDURE — 99239 HOSP IP/OBS DSCHRG MGMT >30: CPT | Performed by: STUDENT IN AN ORGANIZED HEALTH CARE EDUCATION/TRAINING PROGRAM

## 2023-06-08 PROCEDURE — 97162 PT EVAL MOD COMPLEX 30 MIN: CPT

## 2023-06-08 RX ORDER — LIDOCAINE AND PRILOCAINE 25; 25 MG/G; MG/G
CREAM TOPICAL PRN
Status: DISCONTINUED | OUTPATIENT
Start: 2023-06-08 | End: 2023-06-08 | Stop reason: HOSPADM

## 2023-06-08 RX ORDER — LIDOCAINE AND PRILOCAINE 25; 25 MG/G; MG/G
1 CREAM TOPICAL PRN
Qty: 30 G | Refills: 3 | Status: SHIPPED | OUTPATIENT
Start: 2023-06-08

## 2023-06-08 RX ORDER — BETAMETHASONE DIPROPIONATE 0.05 %
1 OINTMENT (GRAM) TOPICAL DAILY
Qty: 45 G | Refills: 1 | Status: SHIPPED | OUTPATIENT
Start: 2023-06-08

## 2023-06-08 RX ADMIN — ASPIRIN 81 MG 81 MG: 81 TABLET ORAL at 06:17

## 2023-06-08 RX ADMIN — AMLODIPINE BESYLATE 5 MG: 5 TABLET ORAL at 06:17

## 2023-06-08 RX ADMIN — MORPHINE SULFATE 4 MG: 4 INJECTION, SOLUTION INTRAMUSCULAR; INTRAVENOUS at 07:37

## 2023-06-08 RX ADMIN — MORPHINE SULFATE 4 MG: 4 INJECTION, SOLUTION INTRAMUSCULAR; INTRAVENOUS at 00:59

## 2023-06-08 RX ADMIN — METOPROLOL SUCCINATE 100 MG: 50 TABLET, EXTENDED RELEASE ORAL at 06:17

## 2023-06-08 RX ADMIN — ACETAMINOPHEN 650 MG: 325 TABLET, FILM COATED ORAL at 06:24

## 2023-06-08 RX ADMIN — LIDOCAINE AND PRILOCAINE 1 APPLICATION: 25; 25 CREAM TOPICAL at 11:50

## 2023-06-08 RX ADMIN — CLOPIDOGREL BISULFATE 75 MG: 75 TABLET ORAL at 06:18

## 2023-06-08 RX ADMIN — PREGABALIN 300 MG: 150 CAPSULE ORAL at 06:17

## 2023-06-08 ASSESSMENT — PAIN DESCRIPTION - PAIN TYPE
TYPE: ACUTE PAIN;CHRONIC PAIN
TYPE: CHRONIC PAIN
TYPE: CHRONIC PAIN

## 2023-06-08 ASSESSMENT — COGNITIVE AND FUNCTIONAL STATUS - GENERAL
SUGGESTED CMS G CODE MODIFIER DAILY ACTIVITY: CI
MOBILITY SCORE: 24
HELP NEEDED FOR BATHING: A LITTLE
SUGGESTED CMS G CODE MODIFIER MOBILITY: CH
DAILY ACTIVITIY SCORE: 23

## 2023-06-08 ASSESSMENT — GAIT ASSESSMENTS
GAIT LEVEL OF ASSIST: STANDBY ASSIST
DISTANCE (FEET): 100

## 2023-06-08 ASSESSMENT — ACTIVITIES OF DAILY LIVING (ADL): TOILETING: INDEPENDENT

## 2023-06-08 NOTE — THERAPY
Occupational Therapy   Initial Evaluation     Patient Name: Lorene Woodard  Age:  52 y.o., Sex:  female  Medical Record #: [id number]  Today's Date: 6/8/2023     Precautions  Precautions: Fall Risk, CAM Boot  Comments: Boot on RLE    Assessment  Patient is 52 y.o. female admitted for RLE ischemia and occluded R iliac artery stent s/p angiogram with stent placement in the left common iliac and extension stents to existing stents. Recently admitted for RLE cellulitis s/p stent placement of right common iliac artery 4/24. Completed ADLs/txfs with SPV and functional ambulation w/o AD and SPV. B hands edematous and had a visible reaction to something on palms with red, elevated bumps. Pending w/u, but able to use hands for functional tasks. Reports lives with dtr who works [employer], but is available to assist PRN when home. Patient will not be actively followed for occupational therapy services at this time, however may be seen if requested by physician for 1 more visit within 30 days to address any discharge or equipment needs.     Plan    Occupational Therapy Initial Treatment Plan   Duration: Evaluation only    DC Equipment Recommendations: Tub / Shower Seat  Discharge Recommendations: Anticipate that the patient will have no further occupational therapy needs after discharge from the hospital (as long as dtr can assist PRN)      Objective     06/08/23 1021   Prior Living Situation   Prior Services Home-Independent   Housing / Facility Mobile Home   Steps Into Home 3   Bathroom Set up Bathtub / Shower Combination;Grab Bars   Equipment Owned Front-Wheel Walker;Grab Bar(s) In Tub / Shower   Lives with - Patient's Self Care Capacity Adult Children   Comments Reports lives with dtr who works [employer], but is available to assist PRN when home.   Prior Level of ADL Function   Self Feeding Independent   Grooming / Hygiene Independent   Bathing Independent   Dressing Independent   Toileting Independent   Prior Level of IADL  "Function   Medication Management Independent   Laundry Independent   Kitchen Mobility Independent   Finances Independent   Home Management Independent   Shopping Independent   Prior Level Of Mobility Independent Without Device in Community;Independent Without Device in Home   Driving / Transportation Driving Independent   Occupation (Pre-Hospital Vocational) Employed Full Time  (, but hasn't been able to work since April)   Precautions   Precautions Fall Risk;CAM Boot   Comments Boot on RLE   Vitals   O2 Delivery Device None - Room Air   Pain 0 - 10 Group   Location Foot;Hand   Location Orientation Right;Left   Therapist Pain Assessment Post Activity;During Activity;Nurse Notified   Cognition    Cognition / Consciousness WDL   Level of Consciousness Alert   Comments very pleasant and cooperative   Passive ROM Upper Body   Passive ROM Upper Body WDL   Active ROM Upper Body   Active ROM Upper Body  X   Comments B hands edematous and had a visible reaction to something on palms with red, elevated bumps. Pending w/u, but able to use hands for functional tasks.   Strength Upper Body   Upper Body Strength  X   Comments B hands edematous and had a visible reaction to something on palms with red, elevated bumps. Pending w/u, but able to use hands for functional tasks.   Sensation Upper Body   Comments reports B hands are \"burning\" and \"itching\"   Coordination Upper Body   Coordination WDL   Balance Assessment   Sitting Balance (Static) Good   Sitting Balance (Dynamic) Good   Standing Balance (Static) Good   Standing Balance (Dynamic) Fair +   Weight Shift Sitting Good   Weight Shift Standing Good   Comments w/o AD and CAM boot on   Bed Mobility    Supine to Sit Supervised   Sit to Supine Supervised   Scooting Supervised   Comments HOB elevated   ADL Assessment   Eating Supervision   Grooming Supervision   Lower Body Dressing Supervision  (CAM boot)   Toileting Supervision   Functional Mobility   Sit to Stand " Supervised   Bed, Chair, Wheelchair Transfer Supervised   Toilet Transfers Supervised   Transfer Method Stand Step   Mobility w/o AD in room   Edema / Skin Assessment   Edema / Skin  X   Comments B hands edematous and had a visible reaction to something on palms with red, elevated bumps. Pending w/u, but able to use hands for functional tasks.   Activity Tolerance   Comments no c/o fatigue   Education Group   Education Provided Role of Occupational Therapist   Role of Occupational Therapist Patient Response Patient;Acceptance;Explanation;Verbal Demonstration

## 2023-06-08 NOTE — PROGRESS NOTES
VASCULAR SURGERY SERVICE                        Progress Note  _________________________________    Angiogram completed yesterday  Patient did well overnight, no specific complaints, right foot feeling much better    A new right common iliac stent and left common iliac stent were placed and right leg perfusion has been successfully restored and the right foot has excellent perfusion at this time    No further need for heparin    ASA 81mg daily and Plavix 75mg daily indefinitely    Patient can be discharged anytime from vascular surgery standpoint and she was instructed to follow-up with me in about 2 months for progress check or sooner if concerns arise.    Appreciate hospitalist services were    Marlon Kaplan MD  [facility]   [facility] preferred or call my office [phone]  __________________________________________________  Patient:Lorene Woodard   MRN:[id number]

## 2023-06-08 NOTE — CARE PLAN
The patient is Stable - Low risk of patient condition declining or worsening    Shift Goals  Clinical Goals: pain control, hemodynamic stability  Patient Goals: pain control, rest  Family Goals: WILDER    Progress made toward(s) clinical / shift goals:  patient complaining of pain to both their back and right foot, Prn medications are being given and pt states relief of pain after taking meds. Wound care was performed this shift    Problem: Knowledge Deficit - Standard  Goal: Patient and family/care givers will demonstrate understanding of plan of care, disease process/condition, diagnostic tests and medications  Outcome: Progressing     Problem: Pain - Standard  Goal: Alleviation of pain or a reduction in pain to the patient’s comfort goal  Outcome: Progressing     Problem: Fall Risk  Goal: Patient will remain free from falls  Outcome: Progressing     Problem: Skin Integrity  Goal: Skin integrity is maintained or improved  Outcome: Progressing     Problem: Hemodynamics  Goal: Patient's hemodynamics, fluid balance and neurologic status will be stable or improve  Outcome: Progressing       Patient is not progressing towards the following goals:

## 2023-06-08 NOTE — THERAPY
Physical Therapy   Initial Evaluation     Patient Name: Lorene Woodard  Age:  52 y.o., Sex:  female  Medical Record #: [id number]  Today's Date: 6/8/2023     Precautions  Precautions: CAM Boot  Comments: RLE    Assessment  Patient is 52 y.o. female w/ hx of COPD, tobacco, HTN, HLD.  Admitted w/ occlusion of iliac stent.  She lives in a mobile home w/ her adult daughter where she was mobile w/o AD.  She does wear a CAM boot on the RLE.  Today, she is rec'd alert, pleasant and agreeable to work w/ PT>  she is able to don her CAM boot w/o assist.  She is able to move in/out of bed, stand and ambulate w/o AD w/ spv.  No loss of balance or need of assist.  She declines the need to perform stairs prior to d/c.  PT for d/c needs.  Plan    Physical Therapy Initial Treatment Plan   Duration: Discharge Needs Only    DC Equipment Recommendations: None  Discharge Recommendations: Anticipate that the patient will have no further physical therapy needs after discharge from the hospital         Objective       06/08/23 1030   Prior Living Situation   Housing / Facility Mobile Home   Steps Into Home 3   Steps In Home 0   Rail Both Rail (Steps into Home)   Equipment Owned Front-Wheel Walker   Lives with - Patient's Self Care Capacity Adult Children   Prior Level of Functional Mobility   Bed Mobility Independent   Transfer Status Independent   Ambulation Independent   Assistive Devices Used None   Stairs Independent   Balance Assessment   Sitting Balance (Static) Fair +   Sitting Balance (Dynamic) Fair +   Standing Balance (Static) Fair +   Standing Balance (Dynamic) Fair +   Weight Shift Sitting Good   Weight Shift Standing Good   Bed Mobility    Supine to Sit Supervised   Sit to Supine Supervised   Gait Analysis   Gait Level Of Assist Standby Assist   Assistive Device None   Distance (Feet) 100   Deviation   (declines the need)   Weight Bearing Status no restrictions   Functional Mobility   Sit to Stand Supervised   Bed, Chair,  Wheelchair Transfer Supervised   Toilet Transfers Supervised   Physical Therapy Initial Treatment Plan    Duration Discharge Needs Only   Anticipated Discharge Equipment and Recommendations   DC Equipment Recommendations None   Discharge Recommendations Anticipate that the patient will have no further physical therapy needs after discharge from the hospital

## 2023-06-09 NOTE — DISCHARGE SUMMARY
Discharge Summary    CHIEF COMPLAINT ON ADMISSION  Chief Complaint   Patient presents with    Sent by MD Guzman BIB [facility] as a transfer from [facility]. Pt had Dr. White today in [place] for R foot. States R foot has been falling asleep often and reports it gets very cold. Seen in ED there, dx with occulusion of iliac stents which she had placed in April. Per reports, no pulses able to be obtained on R foot, however on arrival, R DPP palpated.        Reason for Admission  ems     Admission Date  6/6/2023    CODE STATUS  Prior    HPI & HOSPITAL COURSE  This is a 52 y.o. female here with right ischemic limb  Patient has a history of COPD not on oxygen, tobacco abuse, hypertension, hyperlipidemia.  She initially presented for Right lower extremity cellulitis that was treated with antibiotics.  Vascular surgery consulted, patient had stent placement of right common iliac artery and was placed on dual antiplatelet therapy with aspirin and Plavix.     Since patient has been home, she states she has been compliant with her medications, including aspirin and Plavix.  For the last week and a half, she reported a progressively worsening right hip pain.  She then had a follow-up appointment today and was found to have a reoccluded iliac stent, and was recommended to come into the ED for evaluation.     In the ED, patient found to have normal vital signs.  CT angiogram prior to arrival shows patient has a right iliac stent that has reoccluded.  Patient underwent angiogram on 6/7/2023 of right lower limb patient had perfusion successfully restored right foot has excellent perfusion following procedure.  Patient will continue on aspirin addition to Plavix indefinitely.  Patient will have follow-up with vascular surgery in 2 to 4 weeks for postoperative check.  All necessary medications were provided to patient in hand prior to discharge.  Furthermore patient had complaints of rash on bilateral palms of hand.  Patient  had treponemal testing done which was completely nonreactive.  Further evaluation by infectious disease pharmacy in addition to internal medicine was felt that this represented a autoimmune inflammatory type process patient does have significant history of psoriasis.  Patient was provided with topical steroid cream in addition to Emla cream.  Therefore, she is discharged in good and stable condition to home with close outpatient follow-up.    The patient met 2-midnight criteria for an inpatient stay at the time of discharge.    Discharge Date  6/8/2023    FOLLOW UP ITEMS POST DISCHARGE  Take all medication as prescribed  Go to all follow-up appointments as indicated    DISCHARGE DIAGNOSES  Principal Problem:    Acute lower limb ischemia (POA: Yes)  Active Problems:    Hypertension (POA: Unknown)    Hyperlipidemia (POA: Unknown)    Tobacco abuse (POA: Unknown)  Resolved Problems:    Class 1 obesity in adult (POA: Yes)      FOLLOW UP  Future Appointments   Date Time Provider Department Center   8/8/2023  1:20 PM Sissy Castillo M.D. I [facility]     No follow-up provider specified.    MEDICATIONS ON DISCHARGE     Medication List        START taking these medications        Instructions   betamethasone dipropionate 0.05 % Oint   Doctor's comments: Apply to affected area on palms  Apply 1g to affected area on palms topically every day.  Dose: 1 g     lidocaine-prilocaine 2.5-2.5 % Crea  Commonly known as: EMLA   Apply 1 g topically as needed (itching/irritation).  Dose: 1 g            CONTINUE taking these medications        Instructions   amLODIPine 5 MG Tabs  Commonly known as: NORVASC   Take 1 Tablet by mouth every day.  Dose: 5 mg     aspirin 81 MG Chew chewable tablet  Commonly known as: ASA   Take 1 Tab by mouth every day.  Dose: 81 mg     atorvastatin 20 MG Tabs  Commonly known as: LIPITOR   Take 2 Tablets by mouth every day.  Dose: 40 mg     celecoxib 200 MG Caps  Commonly known as: CELEBREX   Take 200 mg by mouth  every day.  Dose: 200 mg     clopidogrel 75 MG Tabs  Commonly known as: PLAVIX   Take 1 Tablet by mouth every day.  Dose: 75 mg     furosemide 40 MG Tabs  Commonly known as: Lasix   Take 1 Tablet by mouth every day.  Dose: 40 mg     hydrOXYzine HCl 25 MG Tabs  Commonly known as: ATARAX   Take 25 mg by mouth every 6 hours as needed for Anxiety.  Dose: 25 mg     metoprolol  MG Tb24  Commonly known as: TOPROL XL   Take 100 mg by mouth every day.  Dose: 100 mg     morphine ER 30 MG Tbcr tablet  Commonly known as: MS CONTIN   Take 30 mg by mouth every 12 hours.  Dose: 30 mg     oxyCODONE immediate release 10 MG immediate release tablet  Commonly known as: ROXICODONE   Take 10 mg by mouth every 6 hours as needed for Severe Pain.  Dose: 10 mg     potassium chloride SA 20 MEQ Tbcr  Commonly known as: Kdur   Take 1 Tablet by mouth every day.  Dose: 20 mEq     pregabalin 300 MG capsule  Commonly known as: LYRICA   Take 300 mg by mouth every day.  Dose: 300 mg     Secukinumab 150 MG/ML Soaj   Inject 300 mg under the skin every 4 weeks.  Dose: 300 mg              Allergies  No Known Allergies    DIET  No orders of the defined types were placed in this encounter.      ACTIVITY  As tolerated.  Weight bearing as tolerated    CONSULTATIONS  Vascular surgery    PROCEDURES  6/7/2023:  Angiogram complete  Right leg perfusion successfully restored and the right foot has excellent perfusion at this time  No further need for heparin  ASA 81mg daily and Plavix 75mg daily indefinitely    LABORATORY  Lab Results   Component Value Date    SODIUM 143 06/06/2023    POTASSIUM 4.1 06/06/2023    CHLORIDE 109 06/06/2023    CO2 23 06/06/2023    GLUCOSE 93 06/06/2023    BUN 13 06/06/2023    CREATININE 0.82 06/06/2023        Lab Results   Component Value Date    WBC 7.4 06/06/2023    HEMOGLOBIN 12.0 06/06/2023    HEMATOCRIT 36.4 (L) 06/06/2023    PLATELETCT 213 06/06/2023      Please note that this dictation was created using voice recognition  software. I have made every reasonable attempt to correct obvious errors, but I expect that there are errors of grammar and possibly context that I did not discover before finalizing the note.    Total time of the discharge process exceeds 35 minutes.

## 2023-06-23 ENCOUNTER — APPOINTMENT (OUTPATIENT)
Dept: RADIOLOGY | Facility: MEDICAL CENTER | Age: 53
DRG: 253 | End: 2023-06-23
Attending: EMERGENCY MEDICINE
Payer: COMMERCIAL

## 2023-06-23 ENCOUNTER — APPOINTMENT (OUTPATIENT)
Dept: RADIOLOGY | Facility: MEDICAL CENTER | Age: 53
DRG: 253 | End: 2023-06-23
Attending: STUDENT IN AN ORGANIZED HEALTH CARE EDUCATION/TRAINING PROGRAM
Payer: COMMERCIAL

## 2023-06-23 ENCOUNTER — HOSPITAL ENCOUNTER (INPATIENT)
Facility: MEDICAL CENTER | Age: 53
LOS: 5 days | DRG: 253 | End: 2023-06-28
Attending: EMERGENCY MEDICINE | Admitting: STUDENT IN AN ORGANIZED HEALTH CARE EDUCATION/TRAINING PROGRAM
Payer: COMMERCIAL

## 2023-06-23 DIAGNOSIS — R29.898 WEAKNESS OF BOTH LOWER EXTREMITIES: ICD-10-CM

## 2023-06-23 DIAGNOSIS — T82.868A: ICD-10-CM

## 2023-06-23 PROBLEM — Z98.890: Status: ACTIVE | Noted: 2023-06-23

## 2023-06-23 LAB
BASOPHILS # BLD AUTO: 0.5 % (ref 0–1.8)
BASOPHILS # BLD: 0.05 K/UL (ref 0–0.12)
CRP SERPL HS-MCNC: 1.83 MG/DL (ref 0–0.75)
EOSINOPHIL # BLD AUTO: 0.19 K/UL (ref 0–0.51)
EOSINOPHIL NFR BLD: 1.9 % (ref 0–6.9)
ERYTHROCYTE [DISTWIDTH] IN BLOOD BY AUTOMATED COUNT: 44.7 FL (ref 35.9–50)
ERYTHROCYTE [SEDIMENTATION RATE] IN BLOOD BY WESTERGREN METHOD: 86 MM/HOUR (ref 0–25)
HCT VFR BLD AUTO: 34.4 % (ref 37–47)
HGB BLD-MCNC: 11 G/DL (ref 12–16)
IMM GRANULOCYTES # BLD AUTO: 0.12 K/UL (ref 0–0.11)
IMM GRANULOCYTES NFR BLD AUTO: 1.2 % (ref 0–0.9)
LYMPHOCYTES # BLD AUTO: 1.86 K/UL (ref 1–4.8)
LYMPHOCYTES NFR BLD: 18.3 % (ref 22–41)
MCH RBC QN AUTO: 27.4 PG (ref 27–33)
MCHC RBC AUTO-ENTMCNC: 32 G/DL (ref 32.2–35.5)
MCV RBC AUTO: 85.6 FL (ref 81.4–97.8)
MONOCYTES # BLD AUTO: 0.81 K/UL (ref 0–0.85)
MONOCYTES NFR BLD AUTO: 8 % (ref 0–13.4)
NEUTROPHILS # BLD AUTO: 7.14 K/UL (ref 1.82–7.42)
NEUTROPHILS NFR BLD: 70.1 % (ref 44–72)
NRBC # BLD AUTO: 0 K/UL
NRBC BLD-RTO: 0 /100 WBC (ref 0–0.2)
PLATELET # BLD AUTO: 227 K/UL (ref 164–446)
PMV BLD AUTO: 9.5 FL (ref 9–12.9)
RBC # BLD AUTO: 4.02 M/UL (ref 4.2–5.4)
WBC # BLD AUTO: 10.2 K/UL (ref 4.8–10.8)

## 2023-06-23 PROCEDURE — 85652 RBC SED RATE AUTOMATED: CPT

## 2023-06-23 PROCEDURE — 770001 HCHG ROOM/CARE - MED/SURG/GYN PRIV*

## 2023-06-23 PROCEDURE — 99285 EMERGENCY DEPT VISIT HI MDM: CPT

## 2023-06-23 PROCEDURE — 73620 X-RAY EXAM OF FOOT: CPT | Mod: RT

## 2023-06-23 PROCEDURE — 700117 HCHG RX CONTRAST REV CODE 255: Performed by: EMERGENCY MEDICINE

## 2023-06-23 PROCEDURE — 72158 MRI LUMBAR SPINE W/O & W/DYE: CPT

## 2023-06-23 PROCEDURE — 85025 COMPLETE CBC W/AUTO DIFF WBC: CPT

## 2023-06-23 PROCEDURE — A9579 GAD-BASE MR CONTRAST NOS,1ML: HCPCS | Performed by: EMERGENCY MEDICINE

## 2023-06-23 PROCEDURE — 86140 C-REACTIVE PROTEIN: CPT

## 2023-06-23 PROCEDURE — 99223 1ST HOSP IP/OBS HIGH 75: CPT | Performed by: STUDENT IN AN ORGANIZED HEALTH CARE EDUCATION/TRAINING PROGRAM

## 2023-06-23 PROCEDURE — 36415 COLL VENOUS BLD VENIPUNCTURE: CPT

## 2023-06-23 RX ORDER — POLYETHYLENE GLYCOL 3350 17 G/17G
1 POWDER, FOR SOLUTION ORAL
Status: DISCONTINUED | OUTPATIENT
Start: 2023-06-23 | End: 2023-06-28 | Stop reason: HOSPADM

## 2023-06-23 RX ORDER — PROMETHAZINE HYDROCHLORIDE 25 MG/1
12.5-25 SUPPOSITORY RECTAL EVERY 4 HOURS PRN
Status: DISCONTINUED | OUTPATIENT
Start: 2023-06-23 | End: 2023-06-28 | Stop reason: HOSPADM

## 2023-06-23 RX ORDER — MORPHINE SULFATE 15 MG/1
15 TABLET, FILM COATED, EXTENDED RELEASE ORAL EVERY 12 HOURS
COMMUNITY
Start: 2023-06-17

## 2023-06-23 RX ORDER — ONDANSETRON 4 MG/1
4 TABLET, ORALLY DISINTEGRATING ORAL EVERY 4 HOURS PRN
Status: DISCONTINUED | OUTPATIENT
Start: 2023-06-23 | End: 2023-06-28 | Stop reason: HOSPADM

## 2023-06-23 RX ORDER — AMOXICILLIN 250 MG
2 CAPSULE ORAL 2 TIMES DAILY
Status: DISCONTINUED | OUTPATIENT
Start: 2023-06-24 | End: 2023-06-28 | Stop reason: HOSPADM

## 2023-06-23 RX ORDER — ONDANSETRON 2 MG/ML
4 INJECTION INTRAMUSCULAR; INTRAVENOUS EVERY 4 HOURS PRN
Status: DISCONTINUED | OUTPATIENT
Start: 2023-06-23 | End: 2023-06-28 | Stop reason: HOSPADM

## 2023-06-23 RX ORDER — PROCHLORPERAZINE EDISYLATE 5 MG/ML
5-10 INJECTION INTRAMUSCULAR; INTRAVENOUS EVERY 4 HOURS PRN
Status: DISCONTINUED | OUTPATIENT
Start: 2023-06-23 | End: 2023-06-28 | Stop reason: HOSPADM

## 2023-06-23 RX ORDER — AMLODIPINE BESYLATE 2.5 MG/1
5 TABLET ORAL DAILY
COMMUNITY

## 2023-06-23 RX ORDER — BISACODYL 10 MG
10 SUPPOSITORY, RECTAL RECTAL
Status: DISCONTINUED | OUTPATIENT
Start: 2023-06-23 | End: 2023-06-28 | Stop reason: HOSPADM

## 2023-06-23 RX ORDER — PROMETHAZINE HYDROCHLORIDE 25 MG/1
12.5-25 TABLET ORAL EVERY 4 HOURS PRN
Status: DISCONTINUED | OUTPATIENT
Start: 2023-06-23 | End: 2023-06-28 | Stop reason: HOSPADM

## 2023-06-23 RX ADMIN — GADOTERIDOL 20 ML: 279.3 INJECTION, SOLUTION INTRAVENOUS at 21:42

## 2023-06-23 ASSESSMENT — ENCOUNTER SYMPTOMS
DIZZINESS: 0
TINGLING: 1
PALPITATIONS: 0
CHILLS: 0
HEARTBURN: 0
HEADACHES: 0
DOUBLE VISION: 0
BRUISES/BLEEDS EASILY: 0
WEAKNESS: 1
NAUSEA: 0
BLURRED VISION: 0
MYALGIAS: 0
HEMOPTYSIS: 0
COUGH: 0
FEVER: 0
NECK PAIN: 0
DEPRESSION: 0

## 2023-06-23 ASSESSMENT — COGNITIVE AND FUNCTIONAL STATUS - GENERAL
CLIMB 3 TO 5 STEPS WITH RAILING: A LITTLE
WALKING IN HOSPITAL ROOM: A LITTLE
SUGGESTED CMS G CODE MODIFIER MOBILITY: CJ
SUGGESTED CMS G CODE MODIFIER DAILY ACTIVITY: CH
DAILY ACTIVITIY SCORE: 24
MOBILITY SCORE: 22

## 2023-06-23 ASSESSMENT — LIFESTYLE VARIABLES
HAVE YOU EVER FELT YOU SHOULD CUT DOWN ON YOUR DRINKING: NO
EVER HAD A DRINK FIRST THING IN THE MORNING TO STEADY YOUR NERVES TO GET RID OF A HANGOVER: NO
HAVE PEOPLE ANNOYED YOU BY CRITICIZING YOUR DRINKING: NO
DOES PATIENT WANT TO STOP DRINKING: NO
TOTAL SCORE: 0
TOTAL SCORE: 0
CONSUMPTION TOTAL: NEGATIVE
HOW MANY TIMES IN THE PAST YEAR HAVE YOU HAD 5 OR MORE DRINKS IN A DAY: 0
EVER FELT BAD OR GUILTY ABOUT YOUR DRINKING: NO
ON A TYPICAL DAY WHEN YOU DRINK ALCOHOL HOW MANY DRINKS DO YOU HAVE: 1
TOTAL SCORE: 0
AVERAGE NUMBER OF DAYS PER WEEK YOU HAVE A DRINK CONTAINING ALCOHOL: 1
ALCOHOL_USE: YES

## 2023-06-23 ASSESSMENT — PAIN DESCRIPTION - DESCRIPTORS: DESCRIPTORS: ACHING

## 2023-06-23 ASSESSMENT — FIBROSIS 4 INDEX: FIB4 SCORE: 0.98

## 2023-06-24 ENCOUNTER — APPOINTMENT (OUTPATIENT)
Dept: RADIOLOGY | Facility: MEDICAL CENTER | Age: 53
DRG: 253 | End: 2023-06-24
Attending: STUDENT IN AN ORGANIZED HEALTH CARE EDUCATION/TRAINING PROGRAM
Payer: COMMERCIAL

## 2023-06-24 PROBLEM — F11.20 CHRONIC NARCOTIC DEPENDENCE (HCC): Status: ACTIVE | Noted: 2023-06-24

## 2023-06-24 PROBLEM — M54.9 INTRACTABLE BACK PAIN: Status: ACTIVE | Noted: 2023-06-24

## 2023-06-24 PROCEDURE — 97161 PT EVAL LOW COMPLEX 20 MIN: CPT

## 2023-06-24 PROCEDURE — 700102 HCHG RX REV CODE 250 W/ 637 OVERRIDE(OP): Performed by: STUDENT IN AN ORGANIZED HEALTH CARE EDUCATION/TRAINING PROGRAM

## 2023-06-24 PROCEDURE — 93922 UPR/L XTREMITY ART 2 LEVELS: CPT

## 2023-06-24 PROCEDURE — 700111 HCHG RX REV CODE 636 W/ 250 OVERRIDE (IP): Performed by: STUDENT IN AN ORGANIZED HEALTH CARE EDUCATION/TRAINING PROGRAM

## 2023-06-24 PROCEDURE — 99222 1ST HOSP IP/OBS MODERATE 55: CPT | Performed by: SURGERY

## 2023-06-24 PROCEDURE — 99233 SBSQ HOSP IP/OBS HIGH 50: CPT | Performed by: STUDENT IN AN ORGANIZED HEALTH CARE EDUCATION/TRAINING PROGRAM

## 2023-06-24 PROCEDURE — A9270 NON-COVERED ITEM OR SERVICE: HCPCS | Performed by: STUDENT IN AN ORGANIZED HEALTH CARE EDUCATION/TRAINING PROGRAM

## 2023-06-24 PROCEDURE — 73630 X-RAY EXAM OF FOOT: CPT | Mod: RT

## 2023-06-24 PROCEDURE — 770001 HCHG ROOM/CARE - MED/SURG/GYN PRIV*

## 2023-06-24 RX ORDER — POTASSIUM CHLORIDE 20 MEQ/1
20 TABLET, EXTENDED RELEASE ORAL DAILY
Status: DISCONTINUED | OUTPATIENT
Start: 2023-06-24 | End: 2023-06-28 | Stop reason: HOSPADM

## 2023-06-24 RX ORDER — PREGABALIN 150 MG/1
300 CAPSULE ORAL 2 TIMES DAILY
Status: DISCONTINUED | OUTPATIENT
Start: 2023-06-24 | End: 2023-06-28 | Stop reason: HOSPADM

## 2023-06-24 RX ORDER — MORPHINE SULFATE 15 MG/1
15 TABLET, FILM COATED, EXTENDED RELEASE ORAL EVERY 12 HOURS
Status: DISCONTINUED | OUTPATIENT
Start: 2023-06-24 | End: 2023-06-28 | Stop reason: HOSPADM

## 2023-06-24 RX ORDER — ATORVASTATIN CALCIUM 40 MG/1
40 TABLET, FILM COATED ORAL DAILY
Status: DISCONTINUED | OUTPATIENT
Start: 2023-06-24 | End: 2023-06-28 | Stop reason: HOSPADM

## 2023-06-24 RX ORDER — METHYLPREDNISOLONE 4 MG/1
4 TABLET ORAL
Status: DISCONTINUED | OUTPATIENT
Start: 2023-06-27 | End: 2023-06-27

## 2023-06-24 RX ORDER — METHYLPREDNISOLONE 4 MG/1
4 TABLET ORAL 2 TIMES DAILY WITH MEALS
Status: DISCONTINUED | OUTPATIENT
Start: 2023-06-28 | End: 2023-06-27

## 2023-06-24 RX ORDER — METOPROLOL SUCCINATE 100 MG/1
100 TABLET, EXTENDED RELEASE ORAL DAILY
Status: DISCONTINUED | OUTPATIENT
Start: 2023-06-24 | End: 2023-06-28 | Stop reason: HOSPADM

## 2023-06-24 RX ORDER — HYDROMORPHONE HYDROCHLORIDE 1 MG/ML
1 INJECTION, SOLUTION INTRAMUSCULAR; INTRAVENOUS; SUBCUTANEOUS EVERY 4 HOURS PRN
Status: DISCONTINUED | OUTPATIENT
Start: 2023-06-24 | End: 2023-06-25

## 2023-06-24 RX ORDER — FUROSEMIDE 40 MG/1
40 TABLET ORAL DAILY
Status: DISCONTINUED | OUTPATIENT
Start: 2023-06-24 | End: 2023-06-28 | Stop reason: HOSPADM

## 2023-06-24 RX ORDER — TRIAMCINOLONE ACETONIDE 1 MG/G
CREAM TOPICAL 2 TIMES DAILY
Status: DISCONTINUED | OUTPATIENT
Start: 2023-06-24 | End: 2023-06-28 | Stop reason: HOSPADM

## 2023-06-24 RX ORDER — DULOXETIN HYDROCHLORIDE 30 MG/1
30 CAPSULE, DELAYED RELEASE ORAL DAILY
Status: DISCONTINUED | OUTPATIENT
Start: 2023-06-24 | End: 2023-06-28 | Stop reason: HOSPADM

## 2023-06-24 RX ORDER — METHYLPREDNISOLONE 4 MG/1
4 TABLET ORAL
Status: DISPENSED | OUTPATIENT
Start: 2023-06-25 | End: 2023-06-25

## 2023-06-24 RX ORDER — CLOPIDOGREL BISULFATE 75 MG/1
75 TABLET ORAL DAILY
Status: DISCONTINUED | OUTPATIENT
Start: 2023-06-24 | End: 2023-06-28 | Stop reason: HOSPADM

## 2023-06-24 RX ORDER — METHYLPREDNISOLONE 4 MG/1
8 TABLET ORAL
Status: DISPENSED | OUTPATIENT
Start: 2023-06-25 | End: 2023-06-26

## 2023-06-24 RX ORDER — ENOXAPARIN SODIUM 100 MG/ML
40 INJECTION SUBCUTANEOUS DAILY
Status: DISCONTINUED | OUTPATIENT
Start: 2023-06-24 | End: 2023-06-25

## 2023-06-24 RX ORDER — OXYCODONE HYDROCHLORIDE 10 MG/1
10 TABLET ORAL EVERY 6 HOURS PRN
Status: DISCONTINUED | OUTPATIENT
Start: 2023-06-24 | End: 2023-06-28 | Stop reason: HOSPADM

## 2023-06-24 RX ORDER — HYDROXYZINE 50 MG/1
25 TABLET, FILM COATED ORAL EVERY 6 HOURS PRN
Status: DISCONTINUED | OUTPATIENT
Start: 2023-06-24 | End: 2023-06-28 | Stop reason: HOSPADM

## 2023-06-24 RX ORDER — AMLODIPINE BESYLATE 5 MG/1
5 TABLET ORAL DAILY
Status: DISCONTINUED | OUTPATIENT
Start: 2023-06-24 | End: 2023-06-28 | Stop reason: HOSPADM

## 2023-06-24 RX ORDER — ASPIRIN 81 MG/1
81 TABLET, CHEWABLE ORAL DAILY
Status: DISCONTINUED | OUTPATIENT
Start: 2023-06-24 | End: 2023-06-28 | Stop reason: HOSPADM

## 2023-06-24 RX ORDER — METHYLPREDNISOLONE 4 MG/1
4 TABLET ORAL
Status: DISCONTINUED | OUTPATIENT
Start: 2023-06-26 | End: 2023-06-27

## 2023-06-24 RX ADMIN — TRIAMCINOLONE ACETONIDE: 1 CREAM TOPICAL at 18:10

## 2023-06-24 RX ADMIN — METHYLPREDNISOLONE 24 MG: 16 TABLET ORAL at 13:15

## 2023-06-24 RX ADMIN — POTASSIUM CHLORIDE 20 MEQ: 1500 TABLET, EXTENDED RELEASE ORAL at 05:14

## 2023-06-24 RX ADMIN — PREGABALIN 300 MG: 150 CAPSULE ORAL at 18:09

## 2023-06-24 RX ADMIN — OXYCODONE HYDROCHLORIDE 10 MG: 10 TABLET ORAL at 00:58

## 2023-06-24 RX ADMIN — DULOXETINE HYDROCHLORIDE 30 MG: 30 CAPSULE, DELAYED RELEASE ORAL at 13:15

## 2023-06-24 RX ADMIN — SENNOSIDES AND DOCUSATE SODIUM 2 TABLET: 50; 8.6 TABLET ORAL at 18:09

## 2023-06-24 RX ADMIN — SENNOSIDES AND DOCUSATE SODIUM 2 TABLET: 50; 8.6 TABLET ORAL at 05:13

## 2023-06-24 RX ADMIN — ASPIRIN 81 MG 81 MG: 81 TABLET ORAL at 05:14

## 2023-06-24 RX ADMIN — FUROSEMIDE 40 MG: 40 TABLET ORAL at 05:13

## 2023-06-24 RX ADMIN — CLOPIDOGREL BISULFATE 75 MG: 75 TABLET ORAL at 05:14

## 2023-06-24 RX ADMIN — ATORVASTATIN CALCIUM 40 MG: 40 TABLET, FILM COATED ORAL at 05:14

## 2023-06-24 RX ADMIN — ENOXAPARIN SODIUM 40 MG: 100 INJECTION SUBCUTANEOUS at 18:00

## 2023-06-24 RX ADMIN — AMLODIPINE BESYLATE 5 MG: 10 TABLET ORAL at 05:14

## 2023-06-24 RX ADMIN — MORPHINE SULFATE 15 MG: 15 TABLET, FILM COATED, EXTENDED RELEASE ORAL at 18:10

## 2023-06-24 RX ADMIN — MORPHINE SULFATE 15 MG: 15 TABLET, FILM COATED, EXTENDED RELEASE ORAL at 05:14

## 2023-06-24 RX ADMIN — PREGABALIN 300 MG: 150 CAPSULE ORAL at 05:13

## 2023-06-24 RX ADMIN — OXYCODONE HYDROCHLORIDE 10 MG: 10 TABLET ORAL at 13:15

## 2023-06-24 RX ADMIN — OXYCODONE HYDROCHLORIDE 10 MG: 10 TABLET ORAL at 07:28

## 2023-06-24 RX ADMIN — METOPROLOL SUCCINATE 100 MG: 100 TABLET, EXTENDED RELEASE ORAL at 05:14

## 2023-06-24 ASSESSMENT — ENCOUNTER SYMPTOMS
DEPRESSION: 1
NERVOUS/ANXIOUS: 1
BACK PAIN: 1
FEVER: 0
CHILLS: 0
ABDOMINAL PAIN: 0
NECK PAIN: 1
COUGH: 0
VOMITING: 0
DIZZINESS: 0
NAUSEA: 0
SHORTNESS OF BREATH: 0
MYALGIAS: 1
HEADACHES: 1
PALPITATIONS: 0

## 2023-06-24 ASSESSMENT — PAIN DESCRIPTION - PAIN TYPE
TYPE: CHRONIC PAIN

## 2023-06-24 NOTE — ED NOTES
Med rec completed per patient with daughter at bedside.  Allergies reviewed with patient. NKDA.  No outpatient antibiotics within the last 30 days.  Patient's preferred pharmacy: [facility] in [place].    Patient states that she ran out of her betamethasone ointment 4 days ago.     Patient states that she was told to hold her Cosentyx (secukinumab) injections for 2 weeks following her previous admission (6/6/2023 - 6/8/2023) and states that she would have been due to use it today.

## 2023-06-24 NOTE — PROGRESS NOTES
[facility] Medicine Daily Progress Note    Date of Service  6/24/2023    Chief Complaint  Lorene Woodard is a 52 y.o. female admitted 6/23/2023 with intractable bilateral leg pain.    Hospital Course  Lorene Woodard is a 52 y.o. female past medical history of CAD, hypertension, hyperlipidemia, peripheral vascular disease status post placement of right common iliac artery stent on dual antiplatelet therapy aspirin/Plavix, cellulitis treated with antibiotics, psoriasis who presented 6/23/2023 with as a transfer from [facility] due to concerns of cauda equina with urinary incontinence.  Patient had CTA of the lower extremities with runoff which showed patent stents she was given vancomycin and Rocephin prior to arrival to cover for possible infection.  She denies any fevers, chills, chest pain or shortness of breath.  Denies any trauma to the area.  In the ER, she has subjective weakness to bilateral lower extremities with numbness.  On my exam power is 5 out of 5 bilaterally.  She does not have any saddle anesthesia.  She will be admitted to the neuro floor and an MRI of the lumbar spine with and without contrast has been ordered stat in addition to repeat labs CBC, metabolic panel, ESR and CRP.    Interval Problem Update  06/24/23  Patient was significant pain this morning radiating from the low back behind both legs down to the calves.  No exacerbating or alleviating factors.  Started on Medrol Dosepak as well as duloxetine.  She is already on pregabalin extremely high dose of 300 mg twice a day.        I personally reviewed patient's MRI of the lumbar spine, which per my read shows no significant disc herniation.  Facet arthropathy noted.    I explained to the patient that her pain is multifactorial including physical, stress, and psychosocial factors.  Even though she is reporting very severe pain she appears to be very comfortable throughout the day conversing with her family member.  Patient states that  she has had this pain for very long time.      Patient was seen by limb preservation services who was concerned about osteomyelitis of the first and second toes.  Per my examination, patient has dry gangrene changes.  She reports severe pain in her first and second toes.  I will order an MRI with and without contrast of the right foot.  [facility] has consulted Dr. Vivek Figueroa of vascular surgery.      I have discussed this patient's plan of care and discharge plan at IDT rounds today with Case Management, Nursing, Nursing leadership, and other members of the IDT team.    Consultants/Specialty  vascular surgery    Code Status  Full Code    Disposition  The patient is not medically cleared for discharge to home or a post-acute facility.  Anticipate discharge to: home with close outpatient follow-up    I have placed the appropriate orders for post-discharge needs.    Review of Systems  Review of Systems   Constitutional:  Negative for chills and fever.   Respiratory:  Negative for cough and shortness of breath.    Cardiovascular:  Negative for chest pain and palpitations.   Gastrointestinal:  Negative for abdominal pain, nausea and vomiting.   Genitourinary:  Negative for dysuria and hematuria.   Musculoskeletal:  Positive for back pain, joint pain, myalgias and neck pain.   Neurological:  Positive for headaches. Negative for dizziness.   Psychiatric/Behavioral:  Positive for depression. The patient is nervous/anxious.         Physical Exam  Temp:  [36.3 °C (97.3 °F)-37.1 °C (98.7 °F)] 36.3 °C (97.3 °F)  Pulse:  [77-98] 77  Resp:  [15-18] 17  BP: (121-154)/(62-88) 123/69  SpO2:  [92 %-95 %] 94 %    Physical Exam  Vitals and nursing note reviewed.   Constitutional:       General: She is in acute distress.      Appearance: She is obese. She is not ill-appearing or diaphoretic.   HENT:      Head: Normocephalic and atraumatic.      Mouth/Throat:      Mouth: Mucous membranes are moist.      Pharynx: Oropharynx is clear. No  oropharyngeal exudate.   Eyes:      General:         Right eye: No discharge.         Left eye: No discharge.      Conjunctiva/sclera: Conjunctivae normal.      Pupils: Pupils are equal, round, and reactive to light.   Cardiovascular:      Rate and Rhythm: Normal rate and regular rhythm.      Pulses: Normal pulses.      Heart sounds: Normal heart sounds. No murmur heard.  Pulmonary:      Effort: Pulmonary effort is normal. No respiratory distress.      Breath sounds: Normal breath sounds.   Abdominal:      General: Abdomen is flat. Bowel sounds are normal. There is no distension.      Palpations: Abdomen is soft.      Tenderness: There is no abdominal tenderness.   Musculoskeletal:         General: Tenderness present.      Cervical back: Neck supple. No tenderness.      Right lower leg: No edema.      Left lower leg: No edema.   Skin:     Coloration: Skin is pale.      Findings: Rash (Scaling with erythema in the bilateral extremities and also in the hands) present.   Neurological:      Mental Status: She is alert and oriented to person, place, and time.      Motor: No weakness.      Comments: Patient has full strength in the upper and lower extremities.  Sensation is intact to light touch in both the upper and lower extremities.   Psychiatric:         Attention and Perception: Attention normal.         Mood and Affect: Mood is anxious. Affect is labile.         Speech: Speech normal.         Behavior: Behavior is cooperative.         Thought Content: Thought content normal.         Cognition and Memory: Cognition normal.         Judgment: Judgment normal.         Fluids    Intake/Output Summary (Last 24 hours) at 6/24/2023 1748  Last data filed at 6/24/2023 0800  Gross per 24 hour   Intake 240 ml   Output no documentation   Net 240 ml       Laboratory  Recent Labs     06/23/23 1942   WBC 10.2   RBC 4.02*   HEMOGLOBIN 11.0*   HEMATOCRIT 34.4*   MCV 85.6   MCH 27.4   MCHC 32.0*   RDW 44.7   PLATELETCT 227   MPV 9.5                        Imaging  US-BENI SINGLE LEVEL BILAT         DX-FOOT-COMPLETE 3+ RIGHT   Final Result      First and second distal toe skin ulceration which could obscure underlying early osteomyelitis. If there is high clinical concern, MRI could be obtained to further evaluate      MR-LUMBAR SPINE-WITH & W/O   Final Result      1.  Multilevel multifactorial degenerative changes   2.  No areas of abnormal enhancement   3.  No areas of high-grade central canal narrowing   4.  Areas of central canal and neural foraminal narrowing as described above      DX-FOOT-2- RIGHT   Final Result         1.  No acute traumatic bony injury. No destructive osseous process is identified, note however that plain film can be insensitive for evaluation of osteomyelitis and bone scan would offer improved diagnostic sensitivity as clinically appropriate.   2.  Plantar and Achilles calcaneal bone spurs.      US-EXTREMITY ARTERY LOWER BILAT    (Results Pending)   MR-FOOT-WITH & W/O RIGHT    (Results Pending)        Assessment/Plan  * Complaints of weakness of lower extremity- (present on admission)  Assessment & Plan  Subjective weakness of bilateral LE  Power 4-5/5 in bilateral lower extremities  Received Vancomycin/Rocephin PTA. Hold further abx  Neuro checks every 4 hours   CTA with run off at OSH showing patent stents  C/w plavix and asa     MRI per my read shows no significant stenosis or disc herniation.      Intractable back pain- (present on admission)  Assessment & Plan  Acute on chronic.  No signs of cauda equina clinically or or radiographically.    Trial of Medrol Dosepak  Continue home MS Contin 15 mg every 12 hours  Oxycodone as needed for breakthrough  I have ordered IV Dilaudid as needed for breakthrough pain.    Dry gangrene (HCC)- (present on admission)  Assessment & Plan    X-ray per my read shows no bony fractures.  No obvious erosions of the bone consistent with osteomyelitis.    Discussed with limb preservation  services.  Recommending MRI of the foot.  They have consulted Dr. Mckeon for evaluation.  I have ordered MRI of the right foot with and without contrast.    Chronic narcotic dependence (HCC)- (present on admission)  Assessment & Plan  As per history.    Continue home MS Contin 15 mg twice a day.    History of procedure for peripheral vascular disease- (present on admission)  Assessment & Plan  History of stent placement of right common iliac artery and was placed on dual antiplatelet therapy with aspirin and Plavix    Continue aspirin and Plavix.  Dr. Mckeon has been consulted by limb preservation services.    Depression- (present on admission)  Assessment & Plan  As per history.      I have started the patient back on duloxetine for pain  Atarax as needed for anxiety      Psoriasis- (present on admission)  Assessment & Plan  Continue with home meds and topical ointments    Hyperlipidemia- (present on admission)  Assessment & Plan  Continue with statin    Hypertension- (present on admission)  Assessment & Plan  Continue with home metoprolol, Lasix, amlodipine         VTE prophylaxis: enoxaparin ppx    I have performed a physical exam and reviewed and updated ROS and Plan today (6/24/2023). In review of yesterday's note (6/23/2023), there are no changes except as documented above.      Greater than 55 minutes spent prepping to see patient (e.g. review of tests) obtaining and/or reviewing separately obtained history. Performing a medically appropriate examination and evaluation.  Counseling and educating the patient/family/caregiver.  Ordering medications, tests, or procedures.  Referring and communicating with other health care professionals.  Documenting clinical information in EPIC.  Independently interpreting results and communicating results to patient/family/caregiver.  Care coordination.

## 2023-06-24 NOTE — ED PROVIDER NOTES
ED Provider Note    CHIEF COMPLAINT  Chief Complaint   Patient presents with    Leg Pain     Pt biba by [facility], wed pt had numbness and tingling in BLE left leg pt unable to move, thurs pt became incontinent of bowel and bladder, today pt could no tolerate pain, pt was given rocephin and .75 gr of vanco, in route pt was given 200 mcg of fentanyl, 4mg of dilaudid, pt has bilateral iliac crest stents that are occluded, pt transported to rule out Cauda Equina       EXTERNAL RECORDS REVIEWED  Other reviewed the patient's transfer records from [facility] as the patient presented there today with bilateral lower extremity weakness, low back pain, and urinary incontinence.  She is felt to have possible cauda equina from a spinal lesion versus abscess and sent here for higher level of care.    HPI/ROS    Lorene Woodard is a 52 y.o. female who presents with lower extremity weakness and paresthesias.  The patient states on Wednesday she started having some dysfunction to her lower extremities.  She states she was at work when her legs felt like they were tingling.  She states they started to give out on her.  On Thursday she started having difficulty controlling her urine.  She states has been having back pain for quite some time.  She went to [facility] and was sent here for possible cauda equina.  In April the patient was admitted with strep pyogenes sepsis from an infection to the right foot.  Subsequently the patient was found to have occlusion of a stent to the right lower extremity which was revascularized by Dr. Aly the beginning of May.  The patient did receive Rocephin and vancomycin and she was transferred here for an MRI.  She states that this time her legs still feel weak and tingly.  She states she does have significant back pain.  She does not remember any direct injury.  She has not had any associated fevers.  She has not had any vomiting or diarrhea.    PAST MEDICAL  "HISTORY   has a past medical history of CAD (coronary artery disease) (December 2012), Depression, Hyperlipidemia, Hypertension, Psoriasis, Shortness of breath (February 2014), and Smoking.    SURGICAL HISTORY  patient denies any surgical history    FAMILY HISTORY  Family History   Problem Relation Age of Onset    Heart Disease Sister        SOCIAL HISTORY  Social History     Tobacco Use    Smoking status: Former     Packs/day: 0.00     Types: Cigarettes    Smokeless tobacco: Never   Vaping Use    Vaping Use: Never used   Substance and Sexual Activity    Alcohol use: Yes     Comment: rarely    Drug use: Not Currently    Sexual activity: Not on file       CURRENT MEDICATIONS  Home Medications       Reviewed by Aly Keller R.N. (Registered Nurse) on 06/23/23 at 1834  Med List Status: Not Addressed     Medication Last Dose Status   amLODIPine (NORVASC) 5 MG Tab  Active   aspirin (ASA) 81 MG CHEW  Active   atorvastatin (LIPITOR) 20 MG Tab  Active   betamethasone dipropionate 0.05 % Ointment  Active   celecoxib (CELEBREX) 200 MG Cap  Active   clopidogrel (PLAVIX) 75 MG Tab  Active   furosemide (LASIX) 40 MG Tab  Active   hydrOXYzine HCl (ATARAX) 25 MG Tab  Active   lidocaine-prilocaine (EMLA) 2.5-2.5 % Cream  Active   metoprolol SR (TOPROL XL) 100 MG TABLET SR 24 HR  Active   morphine ER (MS CONTIN) 30 MG Tab CR tablet  Active   oxyCODONE immediate release (ROXICODONE) 10 MG immediate release tablet  Active   potassium chloride SA (KDUR) 20 MEQ Tab CR  Active   pregabalin (LYRICA) 300 MG capsule  Active   Secukinumab 150 MG/ML Solution Auto-injector  Active                    ALLERGIES  No Known Allergies    PHYSICAL EXAM  VITAL SIGNS: /69   Pulse 98   Temp 37.1 °C (98.7 °F) (Temporal)   Resp 15   Ht 1.702 m (5' 7\")   Wt 99.8 kg (220 lb)   LMP 03/13/2017   SpO2 93%   BMI 34.46 kg/m²    In general the patient appears chronically ill but no acute distress    HEENT unremarkable    Pulmonary the patient's " chest is clear to auscultation bilaterally    Cardiovascular S1-S2 with a regular rate and rhythm    GI abdomen soft    Skin the patient does have diffuse psoriasis.  She does have some erythema to both the lower extremities anteriorly    Extremities atraumatic could not appreciate any good palpable pulses distally    Neurologic examination cranial nerves II through XII are grossly intact, motor is 5-5 and symmetric throughout, sensations intact, the patient does have some difficulty with proprioception to the right lower extremity        COURSE & MEDICAL DECISION MAKING  This a 52-year-old female who presents the emergency department as a transfer for possible cauda equina syndrome.  I did review the CT angiogram this performed today prior to transfer and the patient's stents are patent.  She does have good runoff to the distal extremities.  The patient's laboratory and Alysis was also reviewed.  She does not have a leukocytosis.  At this time it will have a clear source for the lower extremity symptoms.  She does not have any obvious deficits at this time.  We will perform a bladder scan as she did just urinate to make sure she does not have any retention.  The patient will have an MRI of the lumbar spine with and without contrast to rule out a spinal etiology and she will be admitted to the hospitalist.  I will repeat a CBC as well as an ESR and CRP for possible inflammation such as an abscess causing her presenting symptoms.  She is currently resting in stable condition.  As for the incontinence we will also check a urinalysis to make sure there is no evidence of an infection    FINAL DIAGNOSIS  1.  Lower extremity weakness which is subjective  2.  Lower extremity paresthesias  3.  Incontinence  4.  Psoriasis  5.  Low back pain    Disposition  The patient will be mated in stable condition       Electronically signed by: Vj Melgar M.D., 6/23/2023 7:06 PM

## 2023-06-24 NOTE — ASSESSMENT & PLAN NOTE
History of stent placement of right common iliac artery and was placed on dual antiplatelet therapy with aspirin and Plavix  CT aortogram showing thrombosed, iliac stents.  Taken to the operating room for thrombectomy on 6/25/2023.  Continue aspirin and Plavix.  Lytic therapy as per vascular surgery  As per vascular surgery taken back to the operating room for further intervention revision

## 2023-06-24 NOTE — ASSESSMENT & PLAN NOTE
Subjective weakness of bilateral LE now improving   MRI does not reveal significant stenosis nor disc herniation.

## 2023-06-24 NOTE — PROGRESS NOTES
4 Eyes Skin Assessment Completed by Zach RN and ORLANDO Fernandez.    Head WDL  Ears WDL  Nose WDL  Mouth WDL  Neck WDL  Breast/Chest WDL  Shoulder Blades WDL  Spine WDL  (R) Arm/Elbow/Hand Redness and Rash  (L) Arm/Elbow/Hand Redness and Rash  Abdomen Redness and Non-Blanching  Groin Redness  Scrotum/Coccyx/Buttocks WDL  (R) Leg Redness and Rash  (L) Leg Redness and Rash  (R) Heel/Foot/Toe Discoloration,Chronic wound to toes  (L) Heel/Foot/Toe WDL          Devices In Places Pulse Ox      Interventions In Place Pillows    Possible Skin Injury Yes    Pictures Uploaded Into Epic No, needs to be completed  Wound Consult Placed Yes  RN Wound Prevention Protocol Ordered Yes

## 2023-06-24 NOTE — ASSESSMENT & PLAN NOTE
X-ray per my read shows no bony fractures.  No obvious erosions of the bone consistent with osteomyelitis.    Please see subacute osteomyelitis section

## 2023-06-24 NOTE — HOSPITAL COURSE
Lorene Woodard is a 52 y.o. female past medical history of CAD, hypertension, hyperlipidemia, peripheral vascular disease status post placement of right common iliac artery stent on dual antiplatelet therapy aspirin/Plavix, cellulitis treated with antibiotics, psoriasis who presented 6/23/2023 with as a transfer from [facility] due to concerns of cauda equina with urinary incontinence.  Patient had CTA of the lower extremities with runoff which showed patent stents she was given vancomycin and Rocephin prior to arrival to cover for possible infection.  She denies any fevers, chills, chest pain or shortness of breath.  Denies any trauma to the area.  In the ER, she has subjective weakness to bilateral lower extremities with numbness.  On my exam power is 5 out of 5 bilaterally.  She does not have any saddle anesthesia.  She will be admitted to the neuro floor and an MRI of the lumbar spine with and without contrast has been ordered stat in addition to repeat labs CBC, metabolic panel, ESR and CRP.

## 2023-06-24 NOTE — CONSULTS
LIMB PRESERVATION SERVICE CONSULT     REFERRED BY:     Dr Peters                         DATE OF LPS CONSULTATION: 6/24/23  :    Admission Date: 6/23/2023  Admission Diagnosis: Complaints of weakness of lower extremity [R29.898]      HPI: 52 y.o.  female who presented to the ED by [facility] from [facility] for B LE weakness, LBP, urinary incontinence, sepsis, infection of R foot. Pt does not have DM.  Pt has dry eschars on the distal ends of R toes 1 and 2 .  Pt was just starting her new job at [employer] in [place] when she developed leg tingling that she describes as feeling like her legs are falling asleep.      6/8/23 seen by Dr Kaplan for angiogram with B iliac stents placed and R foot perfusion improved        History reviewed. No pertinent surgical history.  Social History     Tobacco Use    Smoking status: Former     Packs/day: 0.00     Types: Cigarettes    Smokeless tobacco: Never   Vaping Use    Vaping Use: Never used   Substance Use Topics    Alcohol use: Yes     Comment: rarely     Scheduled Medications   Medication Dose Frequency    amLODIPine  5 mg DAILY    aspirin  81 mg DAILY    atorvastatin  40 mg DAILY    clopidogrel  75 mg DAILY    furosemide  40 mg DAILY    metoprolol SR  100 mg DAILY    morphine ER  15 mg Q12HRS    potassium chloride SA  20 mEq DAILY    pregabalin  300 mg BID    senna-docusate  2 Tablet BID     No Known Allergies       LAB VALUES AND IMAGING  Lab Values:  Lab Results   Component Value Date/Time    WBC 10.2 06/23/2023 07:42 PM    RBC 4.02 (L) 06/23/2023 07:42 PM    HEMOGLOBIN 11.0 (L) 06/23/2023 07:42 PM    HEMATOCRIT 34.4 (L) 06/23/2023 07:42 PM    CREACTPROT 1.83 (H) 06/23/2023 07:42 PM    SEDRATEWES 86 (H) 06/23/2023 07:42 PM    HBA1C 5.6 04/22/2023 04:39 PM      X-Ray 6/23/23 no signs of osteo R great toe  MRI Dr Fuentes ordered 6/24/23  BENI ordered 6/24/23      LOWER EXTERMITY ASSESSMENT    -Sensory Neuropathy pt is sensate B feet  -Ankle Pulses 6/24/23 unable to  palpate B PTs and DPs.  By hand held doppler could not find a DP pulse and PTs Bilaterally were muffled  -Pain - constant to R great toe that is worsened with palpation  -Wound Assessment    RIGHT great toe and second toe  Wound is 100% dry eschar  Drainage - none  Odor - none  Priya - erythema limited mostly to toes 1 and 2      INTERVENTIONS BY WOUND TEAM:    --Primary Dressing: betadine  -Secondary Dressing: MILAGROS  -Off loading/pressure relief/foot wear: off loading shoe with pegs removed under toes 1 and 2   -Orders for dressing changes placed for: bedside RN    EVALUATION:  6/24/23 pt with dry stable eschars R great toe 1 and 2 with minimal signs of active infection.  Wounds have been present since mid May 2023. Pt had arterial insufficiency and was revascularized by Dr Kaplan.  ABIs ordered, Dr Figueroa contacted.  Pt is being worked up spinal/neuro issues for possible cause of the onset of B LE weakness.  Will keep eschars dry for now.      Goals of wound care: maintaine dry stable eschars R toes 1 and 2    PLAN OF CARE  -Activity restrictions: ambulation with off loading shoe  -Dressing changes - bedside RN, betadine  -LPS to follow - PRN,      Consults -   -ID not at this time  -Ortho not at this time  --Vascular Dr Figueroa to consult  --PT/OT not yet    DISCHARGE PLAN  -Anticipated wound care needs pt will need ongoing wound care for R toes 1 and 2   -Foot wear - off loading shoe  -Type of facility - OP vs HH      Interdisciplinary consultation: Patient, Bedside RN, Hospitalist Dr Fuentes, vascular Dr Figueroa

## 2023-06-24 NOTE — H&P
Hospital Medicine History & Physical Note    Date of Service  6/23/2023    Primary Care Physician  Renato Mays M.D.    Consultants  none    Specialist Names: none    Code Status  Full Code    Chief Complaint  Chief Complaint   Patient presents with    Leg Pain     Pt biba by [employer], wed pt had numbness and tingling in BLE left leg pt unable to move, thurs pt became incontinent of bowel and bladder, today pt could no tolerate pain, pt was given rocephin and .75 gr of vanco, in route pt was given 200 mcg of fentanyl, 4mg of dilaudid, pt has bilateral iliac crest stents that are occluded, pt transported to rule out Cauda Equina       History of Presenting Illness  Lorene Woodard is a 52 y.o. female past medical history of CAD, hypertension, hyperlipidemia, peripheral vascular disease status post placement of right common iliac artery stent on dual antiplatelet therapy aspirin/Plavix, cellulitis treated with antibiotics, psoriasis who presented 6/23/2023 with as a transfer from [facility] due to concerns of cauda equina with urinary incontinence.  Patient had CTA of the lower extremities with runoff which showed patent stents she was given vancomycin and Rocephin prior to arrival to cover for possible infection.  She denies any fevers, chills, chest pain or shortness of breath.  Denies any trauma to the area.  In the ER, she has subjective weakness to bilateral lower extremities with numbness.  On my exam power is 5 out of 5 bilaterally.  She does not have any saddle anesthesia.  She will be admitted to the neuro floor and an MRI of the lumbar spine with and without contrast has been ordered stat in addition to repeat labs CBC, metabolic panel, ESR and CRP.    Discussed with radiologist on-call, prelim wet read of MRI lumbar spine showing no fluid collection or enhancement suggestive of abscess or bleed.  No wet read evidence of discitis/osteomyelitis.  Will resume home aspirin/Plavix given stents and  peripheral vasculature.  I discussed the plan of care with patient and Er physician .    Review of Systems  Review of Systems   Constitutional:  Negative for chills and fever.   HENT:  Negative for hearing loss and tinnitus.    Eyes:  Negative for blurred vision and double vision.   Respiratory:  Negative for cough and hemoptysis.    Cardiovascular:  Negative for chest pain and palpitations.   Gastrointestinal:  Negative for heartburn and nausea.   Genitourinary:  Negative for dysuria and urgency.   Musculoskeletal:  Negative for myalgias and neck pain.   Neurological:  Positive for tingling and weakness. Negative for dizziness and headaches.   Endo/Heme/Allergies:  Does not bruise/bleed easily.   Psychiatric/Behavioral:  Negative for depression and suicidal ideas.        Past Medical History   has a past medical history of CAD (coronary artery disease) (December 2012), Depression, Hyperlipidemia, Hypertension, Psoriasis, Shortness of breath (February 2014), and Smoking.    Surgical History   has no past surgical history on file.     Family History  family history includes Heart Disease in her sister.   Family history reviewed with patient. There is no family history that is pertinent to the chief complaint.     Social History   reports that she has quit smoking. Her smoking use included cigarettes. She has never used smokeless tobacco. She reports current alcohol use. She reports that she does not currently use drugs.    Allergies  No Known Allergies    Medications  Prior to Admission Medications   Prescriptions Last Dose Informant Patient Reported? Taking?   Secukinumab 150 MG/ML Solution Auto-injector  Patient No No   Sig: Inject 300 mg under the skin every 4 weeks.   amLODIPine (NORVASC) 5 MG Tab  Patient No No   Sig: Take 1 Tablet by mouth every day.   aspirin (ASA) 81 MG CHEW  Patient No No   Sig: Take 1 Tab by mouth every day.   atorvastatin (LIPITOR) 20 MG Tab  Patient No No   Sig: Take 2 Tablets by mouth  every day.   betamethasone dipropionate 0.05 % Ointment   No No   Sig: Apply 1g to affected area on palms topically every day.   celecoxib (CELEBREX) 200 MG Cap  Patient Yes No   Sig: Take 200 mg by mouth every day.   clopidogrel (PLAVIX) 75 MG Tab  Patient No No   Sig: Take 1 Tablet by mouth every day.   furosemide (LASIX) 40 MG Tab  Patient No No   Sig: Take 1 Tablet by mouth every day.   hydrOXYzine HCl (ATARAX) 25 MG Tab  Patient Yes No   Sig: Take 25 mg by mouth every 6 hours as needed for Anxiety.   lidocaine-prilocaine (EMLA) 2.5-2.5 % Cream   No No   Sig: Apply 1 g topically as needed (itching/irritation).   metoprolol SR (TOPROL XL) 100 MG TABLET SR 24 HR  Patient Yes No   Sig: Take 100 mg by mouth every day.   morphine ER (MS CONTIN) 30 MG Tab CR tablet  Patient Yes No   Sig: Take 30 mg by mouth every 12 hours.   oxyCODONE immediate release (ROXICODONE) 10 MG immediate release tablet  Patient Yes No   Sig: Take 10 mg by mouth every 6 hours as needed for Severe Pain.   potassium chloride SA (KDUR) 20 MEQ Tab CR  Patient No No   Sig: Take 1 Tablet by mouth every day.   pregabalin (LYRICA) 300 MG capsule  Patient Yes No   Sig: Take 300 mg by mouth every day.      Facility-Administered Medications: None       Physical Exam  Temp:  [37.1 °C (98.7 °F)] 37.1 °C (98.7 °F)  Pulse:  [93-98] 93  Resp:  [15-16] 16  BP: (121-135)/(62-69) 135/62  SpO2:  [93 %-94 %] 94 %  Blood Pressure: 135/62   Temperature: 37.1 °C (98.7 °F)   Pulse: 93   Respiration: 16   Pulse Oximetry: 94 %       Physical Exam  Vitals and nursing note reviewed.   Constitutional:       Appearance: Normal appearance.   HENT:      Head: Normocephalic and atraumatic.      Right Ear: Tympanic membrane normal.      Left Ear: Tympanic membrane normal.      Nose: Nose normal.      Mouth/Throat:      Mouth: Mucous membranes are moist.      Pharynx: Oropharynx is clear.   Eyes:      Extraocular Movements: Extraocular movements intact.      Pupils: Pupils are  equal, round, and reactive to light.   Cardiovascular:      Rate and Rhythm: Normal rate and regular rhythm.      Pulses: Normal pulses.      Heart sounds: Normal heart sounds.   Pulmonary:      Effort: Pulmonary effort is normal.      Breath sounds: Normal breath sounds.   Abdominal:      General: Bowel sounds are normal. There is no distension.      Palpations: Abdomen is soft. There is no mass.   Musculoskeletal:         General: Normal range of motion.      Cervical back: Neck supple.   Skin:     General: Skin is warm.      Capillary Refill: Capillary refill takes less than 2 seconds.      Comments: Bilateral lower psoriatic skin changes with erythema   Right big toe gangrene noted    Neurological:      General: No focal deficit present.      Mental Status: She is alert and oriented to person, place, and time. Mental status is at baseline.   Psychiatric:         Mood and Affect: Mood normal.         Behavior: Behavior normal.         Laboratory:  Recent Labs     06/23/23 1942   WBC 10.2   RBC 4.02*   HEMOGLOBIN 11.0*   HEMATOCRIT 34.4*   MCV 85.6   MCH 27.4   MCHC 32.0*   RDW 44.7   PLATELETCT 227   MPV 9.5         No results for input(s): ALTSGPT, ASTSGOT, ALKPHOSPHAT, TBILIRUBIN, DBILIRUBIN, GAMMAGT, AMYLASE, LIPASE, ALB, PREALBUMIN, GLUCOSE in the last 72 hours.      No results for input(s): NTPROBNP in the last 72 hours.      No results for input(s): TROPONINT in the last 72 hours.    Imaging:  MR-LUMBAR SPINE-WITH & W/O    (Results Pending)       no X-Ray or EKG requiring interpretation    Assessment/Plan:  Justification for Admission Status  I anticipate this patient will require at least two midnights for appropriate medical management, necessitating inpatient admission because bilateral lower extremity weakness and tingling requiring MRI lumbar spine with and w/o.    Patient will need a Med/Surg bed on NEUROLOGY service .  The need is secondary to see above.    * Complaints of weakness of lower  extremity- (present on admission)  Assessment & Plan  Subjective weakness of bilateral LE  Power 4-5/5 in bilateral lower extremities  MRI Lumbar spine ordered stat with and w/o   Received Vancomycin/Rocephin PTA. Hold further abx  Neuro checks every 4 hours   CTA with run off at OSH showing patent stents  C/w plavix and asa       History of procedure for peripheral vascular disease  Assessment & Plan  History of stent placement of right common iliac artery and was placed on dual antiplatelet therapy with aspirin and Plavix    Gangrene (HCC)- (present on admission)  Assessment & Plan  Reports chronic  LPS and wound care consultation   X-ray of the right big toe r/o osteomyelitis     Depression- (present on admission)  Assessment & Plan  Resume atarax       Psoriasis- (present on admission)  Assessment & Plan  Continue with home meds and topical ointments    Hyperlipidemia- (present on admission)  Assessment & Plan  Continue with statin    Hypertension- (present on admission)  Assessment & Plan  Continue with home metoprolol, Lasix, amlodipine        VTE prophylaxis: SCDs/TEDs

## 2023-06-24 NOTE — ED TRIAGE NOTES
"Chief Complaint   Patient presents with    Leg Pain     Pt biba by [employer], wed pt had numbness and tingling in BLE left leg pt unable to move, thurs pt became incontinent of bowel and bladder, today pt could no tolerate pain, pt was given rocephin and .75 gr of vanco, in route pt was given 200 mcg of fentanyl, 4mg of dilaudid, pt has bilateral iliac crest stents that are occluded, pt transported to rule out Cauda Equina     /69   Pulse 98   Temp 37.1 °C (98.7 °F) (Temporal)   Resp 15   Ht 1.702 m (5' 7\")   Wt 99.8 kg (220 lb)   LMP 03/13/2017   SpO2 93%   BMI 34.46 kg/m²     "

## 2023-06-25 ENCOUNTER — APPOINTMENT (OUTPATIENT)
Dept: RADIOLOGY | Facility: MEDICAL CENTER | Age: 53
DRG: 253 | End: 2023-06-25
Attending: STUDENT IN AN ORGANIZED HEALTH CARE EDUCATION/TRAINING PROGRAM
Payer: COMMERCIAL

## 2023-06-25 ENCOUNTER — APPOINTMENT (OUTPATIENT)
Dept: RADIOLOGY | Facility: MEDICAL CENTER | Age: 53
DRG: 253 | End: 2023-06-25
Attending: SURGERY
Payer: COMMERCIAL

## 2023-06-25 ENCOUNTER — ANESTHESIA (OUTPATIENT)
Dept: SURGERY | Facility: MEDICAL CENTER | Age: 53
DRG: 253 | End: 2023-06-25
Payer: COMMERCIAL

## 2023-06-25 ENCOUNTER — ANESTHESIA EVENT (OUTPATIENT)
Dept: SURGERY | Facility: MEDICAL CENTER | Age: 53
DRG: 253 | End: 2023-06-25
Payer: COMMERCIAL

## 2023-06-25 PROBLEM — M86.271 SUBACUTE OSTEOMYELITIS OF RIGHT FOOT (HCC): Status: ACTIVE | Noted: 2023-06-25

## 2023-06-25 PROBLEM — T82.868A: Status: ACTIVE | Noted: 2023-06-25

## 2023-06-25 LAB
ABO GROUP BLD: NORMAL
ANION GAP SERPL CALC-SCNC: 11 MMOL/L (ref 7–16)
APTT PPP: 27.5 SEC (ref 24.7–36)
APTT PPP: 29.3 SEC (ref 24.7–36)
BASOPHILS # BLD AUTO: 0.4 % (ref 0–1.8)
BASOPHILS # BLD: 0.04 K/UL (ref 0–0.12)
BLD GP AB SCN SERPL QL: NORMAL
BUN SERPL-MCNC: 16 MG/DL (ref 8–22)
CALCIUM SERPL-MCNC: 9.1 MG/DL (ref 8.5–10.5)
CHLORIDE SERPL-SCNC: 100 MMOL/L (ref 96–112)
CO2 SERPL-SCNC: 24 MMOL/L (ref 20–33)
CREAT SERPL-MCNC: 0.65 MG/DL (ref 0.5–1.4)
EOSINOPHIL # BLD AUTO: 0.03 K/UL (ref 0–0.51)
EOSINOPHIL NFR BLD: 0.3 % (ref 0–6.9)
ERYTHROCYTE [DISTWIDTH] IN BLOOD BY AUTOMATED COUNT: 42.5 FL (ref 35.9–50)
FIBRINOGEN PPP-MCNC: 458 MG/DL (ref 215–460)
GFR SERPLBLD CREATININE-BSD FMLA CKD-EPI: 106 ML/MIN/1.73 M 2
GLUCOSE SERPL-MCNC: 130 MG/DL (ref 65–99)
HCT VFR BLD AUTO: 34.6 % (ref 37–47)
HGB BLD-MCNC: 11.2 G/DL (ref 12–16)
IMM GRANULOCYTES # BLD AUTO: 0.15 K/UL (ref 0–0.11)
IMM GRANULOCYTES NFR BLD AUTO: 1.4 % (ref 0–0.9)
INR PPP: 1.1 (ref 0.87–1.13)
INR PPP: 1.13 (ref 0.87–1.13)
LYMPHOCYTES # BLD AUTO: 1.11 K/UL (ref 1–4.8)
LYMPHOCYTES NFR BLD: 10.7 % (ref 22–41)
MCH RBC QN AUTO: 27.5 PG (ref 27–33)
MCHC RBC AUTO-ENTMCNC: 32.4 G/DL (ref 32.2–35.5)
MCV RBC AUTO: 85 FL (ref 81.4–97.8)
MONOCYTES # BLD AUTO: 0.36 K/UL (ref 0–0.85)
MONOCYTES NFR BLD AUTO: 3.5 % (ref 0–13.4)
NEUTROPHILS # BLD AUTO: 8.68 K/UL (ref 1.82–7.42)
NEUTROPHILS NFR BLD: 83.7 % (ref 44–72)
NRBC # BLD AUTO: 0 K/UL
NRBC BLD-RTO: 0 /100 WBC (ref 0–0.2)
PLATELET # BLD AUTO: 214 K/UL (ref 164–446)
PMV BLD AUTO: 9.5 FL (ref 9–12.9)
POTASSIUM SERPL-SCNC: 4.6 MMOL/L (ref 3.6–5.5)
PROTHROMBIN TIME: 14 SEC (ref 12–14.6)
PROTHROMBIN TIME: 14.3 SEC (ref 12–14.6)
RBC # BLD AUTO: 4.07 M/UL (ref 4.2–5.4)
RH BLD: NORMAL
SODIUM SERPL-SCNC: 135 MMOL/L (ref 135–145)
UFH PPP CHRO-ACNC: 0.11 IU/ML
UFH PPP CHRO-ACNC: <0.1 IU/ML
WBC # BLD AUTO: 10.4 K/UL (ref 4.8–10.8)

## 2023-06-25 PROCEDURE — 99233 SBSQ HOSP IP/OBS HIGH 50: CPT | Mod: 25 | Performed by: SURGERY

## 2023-06-25 PROCEDURE — 85520 HEPARIN ASSAY: CPT

## 2023-06-25 PROCEDURE — 85018 HEMOGLOBIN: CPT

## 2023-06-25 PROCEDURE — 86850 RBC ANTIBODY SCREEN: CPT

## 2023-06-25 PROCEDURE — 700102 HCHG RX REV CODE 250 W/ 637 OVERRIDE(OP): Performed by: STUDENT IN AN ORGANIZED HEALTH CARE EDUCATION/TRAINING PROGRAM

## 2023-06-25 PROCEDURE — 160035 HCHG PACU - 1ST 60 MINS PHASE I: Performed by: SURGERY

## 2023-06-25 PROCEDURE — 160048 HCHG OR STATISTICAL LEVEL 1-5: Performed by: SURGERY

## 2023-06-25 PROCEDURE — 700111 HCHG RX REV CODE 636 W/ 250 OVERRIDE (IP): Performed by: ANESTHESIOLOGY

## 2023-06-25 PROCEDURE — 99233 SBSQ HOSP IP/OBS HIGH 50: CPT | Performed by: STUDENT IN AN ORGANIZED HEALTH CARE EDUCATION/TRAINING PROGRAM

## 2023-06-25 PROCEDURE — A9579 GAD-BASE MR CONTRAST NOS,1ML: HCPCS | Performed by: STUDENT IN AN ORGANIZED HEALTH CARE EDUCATION/TRAINING PROGRAM

## 2023-06-25 PROCEDURE — 37211 THROMBOLYTIC ART THERAPY: CPT | Performed by: SURGERY

## 2023-06-25 PROCEDURE — 85730 THROMBOPLASTIN TIME PARTIAL: CPT | Mod: 91

## 2023-06-25 PROCEDURE — A9270 NON-COVERED ITEM OR SERVICE: HCPCS | Performed by: ANESTHESIOLOGY

## 2023-06-25 PROCEDURE — C1894 INTRO/SHEATH, NON-LASER: HCPCS | Performed by: SURGERY

## 2023-06-25 PROCEDURE — 3E05317 INTRODUCTION OF OTHER THROMBOLYTIC INTO PERIPHERAL ARTERY, PERCUTANEOUS APPROACH: ICD-10-PCS | Performed by: SURGERY

## 2023-06-25 PROCEDURE — 85025 COMPLETE CBC W/AUTO DIFF WBC: CPT

## 2023-06-25 PROCEDURE — A9270 NON-COVERED ITEM OR SERVICE: HCPCS | Performed by: STUDENT IN AN ORGANIZED HEALTH CARE EDUCATION/TRAINING PROGRAM

## 2023-06-25 PROCEDURE — 160039 HCHG SURGERY MINUTES - EA ADDL 1 MIN LEVEL 3: Performed by: SURGERY

## 2023-06-25 PROCEDURE — C1887 CATHETER, GUIDING: HCPCS | Performed by: SURGERY

## 2023-06-25 PROCEDURE — 85048 AUTOMATED LEUKOCYTE COUNT: CPT

## 2023-06-25 PROCEDURE — C1769 GUIDE WIRE: HCPCS | Performed by: SURGERY

## 2023-06-25 PROCEDURE — 86900 BLOOD TYPING SEROLOGIC ABO: CPT

## 2023-06-25 PROCEDURE — 700105 HCHG RX REV CODE 258: Performed by: SURGERY

## 2023-06-25 PROCEDURE — 700111 HCHG RX REV CODE 636 W/ 250 OVERRIDE (IP): Performed by: SURGERY

## 2023-06-25 PROCEDURE — 85014 HEMATOCRIT: CPT

## 2023-06-25 PROCEDURE — 700111 HCHG RX REV CODE 636 W/ 250 OVERRIDE (IP): Performed by: INTERNAL MEDICINE

## 2023-06-25 PROCEDURE — 700117 HCHG RX CONTRAST REV CODE 255: Performed by: STUDENT IN AN ORGANIZED HEALTH CARE EDUCATION/TRAINING PROGRAM

## 2023-06-25 PROCEDURE — 75625 CONTRAST EXAM ABDOMINL AORTA: CPT | Mod: 26,59 | Performed by: SURGERY

## 2023-06-25 PROCEDURE — 700101 HCHG RX REV CODE 250: Performed by: ANESTHESIOLOGY

## 2023-06-25 PROCEDURE — C1751 CATH, INF, PER/CENT/MIDLINE: HCPCS | Performed by: SURGERY

## 2023-06-25 PROCEDURE — 86901 BLOOD TYPING SEROLOGIC RH(D): CPT

## 2023-06-25 PROCEDURE — 700117 HCHG RX CONTRAST REV CODE 255: Performed by: SURGERY

## 2023-06-25 PROCEDURE — 04HC33Z INSERTION OF INFUSION DEVICE INTO RIGHT COMMON ILIAC ARTERY, PERCUTANEOUS APPROACH: ICD-10-PCS | Performed by: SURGERY

## 2023-06-25 PROCEDURE — 85384 FIBRINOGEN ACTIVITY: CPT | Mod: 91

## 2023-06-25 PROCEDURE — 502000 HCHG MISC OR IMPLANTS RC 0278: Performed by: SURGERY

## 2023-06-25 PROCEDURE — 160009 HCHG ANES TIME/MIN: Performed by: SURGERY

## 2023-06-25 PROCEDURE — 85041 AUTOMATED RBC COUNT: CPT

## 2023-06-25 PROCEDURE — 160002 HCHG RECOVERY MINUTES (STAT): Performed by: SURGERY

## 2023-06-25 PROCEDURE — 73720 MRI LWR EXTREMITY W/O&W/DYE: CPT | Mod: RT

## 2023-06-25 PROCEDURE — 04HD33Z INSERTION OF INFUSION DEVICE INTO LEFT COMMON ILIAC ARTERY, PERCUTANEOUS APPROACH: ICD-10-PCS | Performed by: SURGERY

## 2023-06-25 PROCEDURE — 01924 ANES THER IVNTL RAD ARTL NOS: CPT | Performed by: ANESTHESIOLOGY

## 2023-06-25 PROCEDURE — 160028 HCHG SURGERY MINUTES - 1ST 30 MINS LEVEL 3: Performed by: SURGERY

## 2023-06-25 PROCEDURE — 700102 HCHG RX REV CODE 250 W/ 637 OVERRIDE(OP): Performed by: ANESTHESIOLOGY

## 2023-06-25 PROCEDURE — 80048 BASIC METABOLIC PNL TOTAL CA: CPT

## 2023-06-25 PROCEDURE — 93925 LOWER EXTREMITY STUDY: CPT

## 2023-06-25 PROCEDURE — 75635 CT ANGIO ABDOMINAL ARTERIES: CPT

## 2023-06-25 PROCEDURE — 700111 HCHG RX REV CODE 636 W/ 250 OVERRIDE (IP): Performed by: STUDENT IN AN ORGANIZED HEALTH CARE EDUCATION/TRAINING PROGRAM

## 2023-06-25 PROCEDURE — 770000 HCHG ROOM/CARE - INTERMEDIATE ICU *

## 2023-06-25 PROCEDURE — 36415 COLL VENOUS BLD VENIPUNCTURE: CPT

## 2023-06-25 PROCEDURE — 85610 PROTHROMBIN TIME: CPT | Mod: 91

## 2023-06-25 PROCEDURE — 700105 HCHG RX REV CODE 258: Performed by: ANESTHESIOLOGY

## 2023-06-25 PROCEDURE — 160036 HCHG PACU - EA ADDL 30 MINS PHASE I: Performed by: SURGERY

## 2023-06-25 PROCEDURE — B4101ZZ FLUOROSCOPY OF ABDOMINAL AORTA USING LOW OSMOLAR CONTRAST: ICD-10-PCS | Performed by: SURGERY

## 2023-06-25 RX ORDER — HEPARIN SODIUM 5000 [USP'U]/100ML
INJECTION, SOLUTION INTRAVENOUS CONTINUOUS
Status: DISCONTINUED | OUTPATIENT
Start: 2023-06-25 | End: 2023-06-26

## 2023-06-25 RX ORDER — EPHEDRINE SULFATE 50 MG/ML
5 INJECTION, SOLUTION INTRAVENOUS
Status: DISCONTINUED | OUTPATIENT
Start: 2023-06-25 | End: 2023-06-25 | Stop reason: HOSPADM

## 2023-06-25 RX ORDER — HYDROMORPHONE HYDROCHLORIDE 1 MG/ML
0.2 INJECTION, SOLUTION INTRAMUSCULAR; INTRAVENOUS; SUBCUTANEOUS
Status: DISCONTINUED | OUTPATIENT
Start: 2023-06-25 | End: 2023-06-25 | Stop reason: HOSPADM

## 2023-06-25 RX ORDER — OXYCODONE HCL 5 MG/5 ML
10 SOLUTION, ORAL ORAL
Status: COMPLETED | OUTPATIENT
Start: 2023-06-25 | End: 2023-06-25

## 2023-06-25 RX ORDER — HALOPERIDOL 5 MG/ML
1 INJECTION INTRAMUSCULAR
Status: DISCONTINUED | OUTPATIENT
Start: 2023-06-25 | End: 2023-06-25 | Stop reason: HOSPADM

## 2023-06-25 RX ORDER — HEPARIN SODIUM 1000 [USP'U]/ML
80 INJECTION, SOLUTION INTRAVENOUS; SUBCUTANEOUS ONCE
Status: COMPLETED | OUTPATIENT
Start: 2023-06-25 | End: 2023-06-25

## 2023-06-25 RX ORDER — HEPARIN SODIUM 5000 [USP'U]/100ML
0-30 INJECTION, SOLUTION INTRAVENOUS CONTINUOUS
Status: DISCONTINUED | OUTPATIENT
Start: 2023-06-25 | End: 2023-06-25

## 2023-06-25 RX ORDER — DIPHENHYDRAMINE HYDROCHLORIDE 50 MG/ML
12.5 INJECTION INTRAMUSCULAR; INTRAVENOUS
Status: DISCONTINUED | OUTPATIENT
Start: 2023-06-25 | End: 2023-06-25 | Stop reason: HOSPADM

## 2023-06-25 RX ORDER — HEPARIN SODIUM 5000 [USP'U]/100ML
INJECTION, SOLUTION INTRAVENOUS CONTINUOUS
Status: DISCONTINUED | OUTPATIENT
Start: 2023-06-25 | End: 2023-06-25

## 2023-06-25 RX ORDER — HYDROMORPHONE HYDROCHLORIDE 1 MG/ML
1 INJECTION, SOLUTION INTRAMUSCULAR; INTRAVENOUS; SUBCUTANEOUS
Status: DISCONTINUED | OUTPATIENT
Start: 2023-06-25 | End: 2023-06-27

## 2023-06-25 RX ORDER — LIDOCAINE HYDROCHLORIDE 20 MG/ML
INJECTION, SOLUTION EPIDURAL; INFILTRATION; INTRACAUDAL; PERINEURAL PRN
Status: DISCONTINUED | OUTPATIENT
Start: 2023-06-25 | End: 2023-06-25 | Stop reason: SURG

## 2023-06-25 RX ORDER — ONDANSETRON 2 MG/ML
4 INJECTION INTRAMUSCULAR; INTRAVENOUS
Status: DISCONTINUED | OUTPATIENT
Start: 2023-06-25 | End: 2023-06-25 | Stop reason: HOSPADM

## 2023-06-25 RX ORDER — OXYCODONE HCL 5 MG/5 ML
5 SOLUTION, ORAL ORAL
Status: COMPLETED | OUTPATIENT
Start: 2023-06-25 | End: 2023-06-25

## 2023-06-25 RX ORDER — ONDANSETRON 2 MG/ML
INJECTION INTRAMUSCULAR; INTRAVENOUS PRN
Status: DISCONTINUED | OUTPATIENT
Start: 2023-06-25 | End: 2023-06-25 | Stop reason: SURG

## 2023-06-25 RX ORDER — METOPROLOL TARTRATE 1 MG/ML
INJECTION, SOLUTION INTRAVENOUS PRN
Status: DISCONTINUED | OUTPATIENT
Start: 2023-06-25 | End: 2023-06-25 | Stop reason: SURG

## 2023-06-25 RX ORDER — LABETALOL HYDROCHLORIDE 5 MG/ML
5 INJECTION, SOLUTION INTRAVENOUS
Status: DISCONTINUED | OUTPATIENT
Start: 2023-06-25 | End: 2023-06-25 | Stop reason: HOSPADM

## 2023-06-25 RX ORDER — HYDROMORPHONE HYDROCHLORIDE 1 MG/ML
0.4 INJECTION, SOLUTION INTRAMUSCULAR; INTRAVENOUS; SUBCUTANEOUS
Status: DISCONTINUED | OUTPATIENT
Start: 2023-06-25 | End: 2023-06-25 | Stop reason: HOSPADM

## 2023-06-25 RX ORDER — HYDROMORPHONE HYDROCHLORIDE 1 MG/ML
0.1 INJECTION, SOLUTION INTRAMUSCULAR; INTRAVENOUS; SUBCUTANEOUS
Status: DISCONTINUED | OUTPATIENT
Start: 2023-06-25 | End: 2023-06-25 | Stop reason: HOSPADM

## 2023-06-25 RX ORDER — SODIUM CHLORIDE, SODIUM LACTATE, POTASSIUM CHLORIDE, CALCIUM CHLORIDE 600; 310; 30; 20 MG/100ML; MG/100ML; MG/100ML; MG/100ML
INJECTION, SOLUTION INTRAVENOUS
Status: DISCONTINUED | OUTPATIENT
Start: 2023-06-25 | End: 2023-06-25 | Stop reason: SURG

## 2023-06-25 RX ORDER — DEXAMETHASONE SODIUM PHOSPHATE 4 MG/ML
INJECTION, SOLUTION INTRA-ARTICULAR; INTRALESIONAL; INTRAMUSCULAR; INTRAVENOUS; SOFT TISSUE PRN
Status: DISCONTINUED | OUTPATIENT
Start: 2023-06-25 | End: 2023-06-25 | Stop reason: SURG

## 2023-06-25 RX ORDER — CEFAZOLIN SODIUM 1 G/3ML
INJECTION, POWDER, FOR SOLUTION INTRAMUSCULAR; INTRAVENOUS PRN
Status: DISCONTINUED | OUTPATIENT
Start: 2023-06-25 | End: 2023-06-25 | Stop reason: SURG

## 2023-06-25 RX ORDER — HYDRALAZINE HYDROCHLORIDE 20 MG/ML
5 INJECTION INTRAMUSCULAR; INTRAVENOUS
Status: DISCONTINUED | OUTPATIENT
Start: 2023-06-25 | End: 2023-06-25 | Stop reason: HOSPADM

## 2023-06-25 RX ORDER — ROCURONIUM BROMIDE 10 MG/ML
INJECTION, SOLUTION INTRAVENOUS PRN
Status: DISCONTINUED | OUTPATIENT
Start: 2023-06-25 | End: 2023-06-25 | Stop reason: SURG

## 2023-06-25 RX ORDER — HEPARIN SODIUM 1000 [USP'U]/ML
40 INJECTION, SOLUTION INTRAVENOUS; SUBCUTANEOUS PRN
Status: DISCONTINUED | OUTPATIENT
Start: 2023-06-25 | End: 2023-06-25

## 2023-06-25 RX ORDER — SODIUM CHLORIDE, SODIUM LACTATE, POTASSIUM CHLORIDE, CALCIUM CHLORIDE 600; 310; 30; 20 MG/100ML; MG/100ML; MG/100ML; MG/100ML
INJECTION, SOLUTION INTRAVENOUS CONTINUOUS
Status: DISCONTINUED | OUTPATIENT
Start: 2023-06-25 | End: 2023-06-25 | Stop reason: HOSPADM

## 2023-06-25 RX ADMIN — METHYLPREDNISOLONE 4 MG: 4 TABLET ORAL at 08:24

## 2023-06-25 RX ADMIN — SODIUM CHLORIDE, POTASSIUM CHLORIDE, SODIUM LACTATE AND CALCIUM CHLORIDE: 600; 310; 30; 20 INJECTION, SOLUTION INTRAVENOUS at 14:05

## 2023-06-25 RX ADMIN — CLOPIDOGREL BISULFATE 75 MG: 75 TABLET ORAL at 06:28

## 2023-06-25 RX ADMIN — POTASSIUM CHLORIDE 20 MEQ: 1500 TABLET, EXTENDED RELEASE ORAL at 06:27

## 2023-06-25 RX ADMIN — HYDROMORPHONE HYDROCHLORIDE 0.4 MG: 1 INJECTION, SOLUTION INTRAMUSCULAR; INTRAVENOUS; SUBCUTANEOUS at 15:53

## 2023-06-25 RX ADMIN — DULOXETINE HYDROCHLORIDE 30 MG: 30 CAPSULE, DELAYED RELEASE ORAL at 06:28

## 2023-06-25 RX ADMIN — GADOTERIDOL 20 ML: 279.3 INJECTION, SOLUTION INTRAVENOUS at 02:37

## 2023-06-25 RX ADMIN — MORPHINE SULFATE 15 MG: 15 TABLET, FILM COATED, EXTENDED RELEASE ORAL at 06:28

## 2023-06-25 RX ADMIN — TRIAMCINOLONE ACETONIDE: 1 CREAM TOPICAL at 06:28

## 2023-06-25 RX ADMIN — HEPARIN SODIUM 6200 UNITS: 1000 INJECTION, SOLUTION INTRAVENOUS; SUBCUTANEOUS at 11:03

## 2023-06-25 RX ADMIN — SUGAMMADEX 200 MG: 100 INJECTION, SOLUTION INTRAVENOUS at 15:02

## 2023-06-25 RX ADMIN — FENTANYL CITRATE 50 MCG: 50 INJECTION, SOLUTION INTRAMUSCULAR; INTRAVENOUS at 15:29

## 2023-06-25 RX ADMIN — AMLODIPINE BESYLATE 5 MG: 10 TABLET ORAL at 06:28

## 2023-06-25 RX ADMIN — OXYCODONE HYDROCHLORIDE 10 MG: 5 SOLUTION ORAL at 16:11

## 2023-06-25 RX ADMIN — DEXAMETHASONE SODIUM PHOSPHATE 4 MG: 4 INJECTION INTRA-ARTICULAR; INTRALESIONAL; INTRAMUSCULAR; INTRAVENOUS; SOFT TISSUE at 14:13

## 2023-06-25 RX ADMIN — HYDROMORPHONE HYDROCHLORIDE 0.2 MG: 1 INJECTION, SOLUTION INTRAMUSCULAR; INTRAVENOUS; SUBCUTANEOUS at 16:45

## 2023-06-25 RX ADMIN — HYDROMORPHONE HYDROCHLORIDE 0.2 MG: 1 INJECTION, SOLUTION INTRAMUSCULAR; INTRAVENOUS; SUBCUTANEOUS at 15:48

## 2023-06-25 RX ADMIN — LIDOCAINE HYDROCHLORIDE 100 MG: 20 INJECTION, SOLUTION EPIDURAL; INFILTRATION; INTRACAUDAL at 14:13

## 2023-06-25 RX ADMIN — FENTANYL CITRATE 50 MCG: 50 INJECTION, SOLUTION INTRAMUSCULAR; INTRAVENOUS at 15:23

## 2023-06-25 RX ADMIN — FENTANYL CITRATE 50 MCG: 50 INJECTION, SOLUTION INTRAMUSCULAR; INTRAVENOUS at 14:46

## 2023-06-25 RX ADMIN — FUROSEMIDE 40 MG: 40 TABLET ORAL at 06:28

## 2023-06-25 RX ADMIN — ALTEPLASE 0.5 MG/HR: KIT at 15:45

## 2023-06-25 RX ADMIN — PREGABALIN 300 MG: 150 CAPSULE ORAL at 06:27

## 2023-06-25 RX ADMIN — IOHEXOL 100 ML: 350 INJECTION, SOLUTION INTRAVENOUS at 10:47

## 2023-06-25 RX ADMIN — HYDROMORPHONE HYDROCHLORIDE 1 MG: 1 INJECTION, SOLUTION INTRAMUSCULAR; INTRAVENOUS; SUBCUTANEOUS at 18:20

## 2023-06-25 RX ADMIN — PROPOFOL 140 MG: 10 INJECTION, EMULSION INTRAVENOUS at 14:13

## 2023-06-25 RX ADMIN — ASPIRIN 81 MG 81 MG: 81 TABLET ORAL at 06:28

## 2023-06-25 RX ADMIN — FENTANYL CITRATE 50 MCG: 50 INJECTION, SOLUTION INTRAMUSCULAR; INTRAVENOUS at 14:55

## 2023-06-25 RX ADMIN — CEFAZOLIN 2 G: 1 INJECTION, POWDER, FOR SOLUTION INTRAMUSCULAR; INTRAVENOUS at 14:15

## 2023-06-25 RX ADMIN — ALTEPLASE 0.5 MG/HR: KIT at 15:36

## 2023-06-25 RX ADMIN — HEPARIN SODIUM 250 UNITS/HR: 5000 INJECTION, SOLUTION INTRAVENOUS at 16:00

## 2023-06-25 RX ADMIN — ATORVASTATIN CALCIUM 40 MG: 40 TABLET, FILM COATED ORAL at 06:28

## 2023-06-25 RX ADMIN — HYDROMORPHONE HYDROCHLORIDE 0.4 MG: 1 INJECTION, SOLUTION INTRAMUSCULAR; INTRAVENOUS; SUBCUTANEOUS at 15:36

## 2023-06-25 RX ADMIN — HYDROMORPHONE HYDROCHLORIDE 0.4 MG: 1 INJECTION, SOLUTION INTRAMUSCULAR; INTRAVENOUS; SUBCUTANEOUS at 16:40

## 2023-06-25 RX ADMIN — HEPARIN SODIUM 18 UNITS/KG/HR: 5000 INJECTION, SOLUTION INTRAVENOUS at 11:12

## 2023-06-25 RX ADMIN — FENTANYL CITRATE 50 MCG: 50 INJECTION, SOLUTION INTRAMUSCULAR; INTRAVENOUS at 14:35

## 2023-06-25 RX ADMIN — HYDROMORPHONE HYDROCHLORIDE 1 MG: 1 INJECTION, SOLUTION INTRAMUSCULAR; INTRAVENOUS; SUBCUTANEOUS at 21:56

## 2023-06-25 RX ADMIN — ROCURONIUM BROMIDE 50 MG: 50 INJECTION, SOLUTION INTRAVENOUS at 14:13

## 2023-06-25 RX ADMIN — HEPARIN SODIUM 250 UNITS/HR: 5000 INJECTION, SOLUTION INTRAVENOUS at 16:19

## 2023-06-25 RX ADMIN — METOPROLOL SUCCINATE 100 MG: 100 TABLET, EXTENDED RELEASE ORAL at 06:27

## 2023-06-25 RX ADMIN — METOPROLOL TARTRATE 2 MG: 5 INJECTION INTRAVENOUS at 14:58

## 2023-06-25 RX ADMIN — FENTANYL CITRATE 50 MCG: 50 INJECTION, SOLUTION INTRAMUSCULAR; INTRAVENOUS at 15:01

## 2023-06-25 RX ADMIN — ONDANSETRON 4 MG: 2 INJECTION INTRAMUSCULAR; INTRAVENOUS at 14:13

## 2023-06-25 RX ADMIN — HYDROMORPHONE HYDROCHLORIDE 1 MG: 1 INJECTION, SOLUTION INTRAMUSCULAR; INTRAVENOUS; SUBCUTANEOUS at 19:53

## 2023-06-25 RX ADMIN — HYDROMORPHONE HYDROCHLORIDE 0.4 MG: 1 INJECTION, SOLUTION INTRAMUSCULAR; INTRAVENOUS; SUBCUTANEOUS at 16:34

## 2023-06-25 ASSESSMENT — ENCOUNTER SYMPTOMS
DEPRESSION: 1
NAUSEA: 0
ABDOMINAL PAIN: 0
MYALGIAS: 1
NERVOUS/ANXIOUS: 1
CHILLS: 0
HEADACHES: 1
BACK PAIN: 1
VOMITING: 0
COUGH: 0
FEVER: 0
SHORTNESS OF BREATH: 0
NECK PAIN: 1
DIZZINESS: 0
PALPITATIONS: 0

## 2023-06-25 ASSESSMENT — PAIN DESCRIPTION - PAIN TYPE
TYPE: ACUTE PAIN
TYPE: ACUTE PAIN
TYPE: CHRONIC PAIN
TYPE: ACUTE PAIN
TYPE: CHRONIC PAIN

## 2023-06-25 ASSESSMENT — FIBROSIS 4 INDEX: FIB4 SCORE: 0.97

## 2023-06-25 ASSESSMENT — PAIN SCALES - GENERAL: PAIN_LEVEL: 2

## 2023-06-25 NOTE — CARE PLAN
The patient is Stable - Low risk of patient condition declining or worsening    Shift Goals  Patient Goals: feel bet    Problem: Knowledge Deficit - Standard  Goal: Patient and family/care givers will demonstrate understanding of plan of care, disease process/condition, diagnostic tests and medications  Description: Target End Date:  1-3 days or as soon as patient condition allows    Document in Patient Education    1.  Patient and family/caregiver oriented to unit, equipment, visitation policy and means for communicating concern  2.  Complete/review Learning Assessment  3.  Assess knowledge level of disease process/condition, treatment plan, diagnostic tests and medications  4.  Explain disease process/condition, treatment plan, diagnostic tests and medications  Outcome: Progressing PT updated on plan of care,  pt outstanding for MRI of foot.

## 2023-06-25 NOTE — WOUND TEAM
Wound consult placed regarding Right foot/toe. Chart and images reviewed. Pt was evaluated by wound team/LPS on 6/24/23. Wound consult completed. Please defer to LPS for further recommendations.

## 2023-06-25 NOTE — ANESTHESIA PROCEDURE NOTES
Airway    Date/Time: 6/25/2023 2:14 PM    Performed by: Olive Quispe M.D.  Authorized by: Olive Quispe M.D.    Location:  OR  Urgency:  Elective  Indications for Airway Management:  Anesthesia      Spontaneous Ventilation: absent    Sedation Level:  Deep  Preoxygenated: Yes    Patient Position:  Sniffing  MILS Maintained Throughout: Yes    Mask Difficulty Assessment:  0 - not attempted  Final Airway Type:  Endotracheal airway  Final Endotracheal Airway:  ETT  Cuffed: Yes    Technique Used for Successful ETT Placement:  Direct laryngoscopy  Devices/Methods Used in Placement:  Cricoid pressure and intubating stylet    Insertion Site:  Oral  Blade Type:  Russell  Laryngoscope Blade/Videolaryngoscope Blade Size:  2  ETT Size (mm):  7.0  Measured from:  Lips  ETT to Lips (cm):  22  Placement Verified by: capnometry    Cormack-Lehane Classification:  Grade I - full view of glottis  Number of Attempts at Approach:  1

## 2023-06-25 NOTE — CARE PLAN
The patient is Watcher - Medium risk of patient condition declining or worsening    Shift Goals  Clinical Goals: rest, safety, q4 neuros, pain control  Patient Goals: rest  Family Goals: jayme    Progress made toward(s) clinical / shift goals:    Problem: Pain - Standard  Goal: Alleviation of pain or a reduction in pain to the patient’s comfort goal  Outcome: Progressing  Note: Pt educated on pain scale, pt verbalized understanding. Pt educated on medications per MAR, pt educated on importance of calling if needing PRN pain medications per MAR. Pt verbalized understanding.      Problem: Knowledge Deficit - Standard  Goal: Patient and family/care givers will demonstrate understanding of plan of care, disease process/condition, diagnostic tests and medications  Outcome: Progressing  Note: Educated/updated on POC, all questions answered.        Patient is not progressing towards the following goals:

## 2023-06-25 NOTE — ANESTHESIA POSTPROCEDURE EVALUATION
Patient: Lorene Woodard    Procedure Summary     Date: 06/25/23 Room / Location: [place] [id number] / SURGERY [facility]    Anesthesia Start: 1405 Anesthesia Stop: 1516    Procedures:       THROMBECTOMY      ANGIOPLASTY, FEMOROPOPLITEAL      CREATION, BYPASS, ARTERIAL, FEMORAL TO TIBIAL Diagnosis:     Surgeons: Vivek Figueroa M.D. Responsible Provider: Olive Quispe M.D.    Anesthesia Type: general ASA Status: 3 - Emergent          Final Anesthesia Type: general  Last vitals  BP   Blood Pressure: 125/72    Temp   35.9 °C (96.7 °F)    Pulse   61   Resp   16    SpO2   95 %      Anesthesia Post Evaluation    Patient location during evaluation: PACU  Patient participation: complete - patient participated  Level of consciousness: awake and alert  Pain score: 2    Airway patency: patent  Anesthetic complications: no  Cardiovascular status: adequate and hemodynamically stable  Respiratory status: acceptable  Hydration status: acceptable    PONV: none          Encounter Notable Events   Notable Event Outcome Phase Comment   Hypothermia requiring warming blanket  Intraprocedure         Nurse Pain Score: 2 (NPRS)

## 2023-06-25 NOTE — ANESTHESIA PREPROCEDURE EVALUATION
Case: [id number] Date/Time: 06/25/23 1300    Procedures:       THROMBECTOMY      ANGIOPLASTY, FEMOROPOPLITEAL      CREATION, BYPASS, ARTERIAL, FEMORAL TO TIBIAL    Location: [facility] OR 19 / SURGERY [facility]    Surgeons: Vivek Figueroa M.D.      52yoF with PMHx of CAD, HTN, psoriasis    Last ate at 8am  No AC  NKDA      Relevant Problems   PULMONARY   (positive) Shortness of breath      CARDIAC   (positive) AMI (acute myocardial infarction) (HCC)   (positive) Acute lower limb ischemia   (positive) CAD (coronary artery disease)   (positive) Hypertension   (positive) Iliac artery stenosis, right (HCC)      Other   (positive) Psoriatic arthritis (HCC)       Physical Exam    Airway   Mallampati: II       Cardiovascular - normal exam     Dental - normal exam           Pulmonary - normal exam     Abdominal   (+) obese     Neurological - normal exam                 Anesthesia Plan    ASA 3- EMERGENT   ASA physical status 3 criteria: CAD/stents (> 3 months)ASA physical status emergent criteria: acute ischemia (limb, body part, tissue)    Plan - general       Airway plan will be ETT          Induction: intravenous and rapid sequence    Postoperative Plan: Postoperative administration of opioids is intended.    Pertinent diagnostic labs and testing reviewed    Informed Consent:    Anesthetic plan and risks discussed with patient.

## 2023-06-25 NOTE — ASSESSMENT & PLAN NOTE
MRI of the right foot shows second terminal tuft osteomyelitis without evidence of septic arthropathy or abscess.  Dysmorphic first distal phalanx terminal tuft suggestive of chronic abnormality likely chronic osteomyelitis.  LPS following  Orthopedic surgery versus vascular surgery in terms of amputation  Status post revascularization.

## 2023-06-25 NOTE — PROGRESS NOTES
Vascular surgery    Patient awake and alert  Denies pain lower extremities  Report intermittent numbness    Results of noninvasive arterial studies noted  Severely diminished inflow consistent with possible iliac stent thrombosis  Initiate heparin drip  Obtain stat CTA aorta with runoff  Keep n.p.o.    Further recommendations will be based on the results of the CTA  Patient likely going to surgery today for reestablishment of inflow    This was all discussed with the patient  Patient demonstrates clear understanding    Vivek Figueroa MD

## 2023-06-25 NOTE — ASSESSMENT & PLAN NOTE
Acute on chronic.  No signs of cauda equina clinically or or radiographically.  Continue home MS Contin 15 mg every 12 hours  Oxycodone as needed for breakthrough  S/p steroids  Now improving

## 2023-06-25 NOTE — PROGRESS NOTES
[facility] Medicine Daily Progress Note    Date of Service  6/25/2023    Chief Complaint  Lorene Woodard is a 52 y.o. female admitted 6/23/2023 with intractable bilateral leg pain.    Hospital Course  Lorene Woodard is a 52 y.o. female past medical history of CAD, hypertension, hyperlipidemia, peripheral vascular disease status post placement of right common iliac artery stent on dual antiplatelet therapy aspirin/Plavix, cellulitis treated with antibiotics, psoriasis who presented 6/23/2023 with as a transfer from [facility] due to concerns of cauda equina with urinary incontinence.  Patient had CTA of the lower extremities with runoff which showed patent stents she was given vancomycin and Rocephin prior to arrival to cover for possible infection.  She denies any fevers, chills, chest pain or shortness of breath.  Denies any trauma to the area.  In the ER, she has subjective weakness to bilateral lower extremities with numbness.  On my exam power is 5 out of 5 bilaterally.  She does not have any saddle anesthesia.  She will be admitted to the neuro floor and an MRI of the lumbar spine with and without contrast has been ordered stat in addition to repeat labs CBC, metabolic panel, ESR and CRP.    Interval Problem Update  06/24/23  Patient was significant pain this morning radiating from the low back behind both legs down to the calves.  No exacerbating or alleviating factors.  Started on Medrol Dosepak as well as duloxetine.  She is already on pregabalin extremely high dose of 300 mg twice a day.        I personally reviewed patient's MRI of the lumbar spine, which per my read shows no significant disc herniation.  Facet arthropathy noted.    I explained to the patient that her pain is multifactorial including physical, stress, and psychosocial factors.  Even though she is reporting very severe pain she appears to be very comfortable throughout the day conversing with her family member.  Patient states that  she has had this pain for very long time.      Patient was seen by limb preservation services who was concerned about osteomyelitis of the first and second toes.  Per my examination, patient has dry gangrene changes.  She reports severe pain in her first and second toes.  I will order an MRI with and without contrast of the right foot.  [facility] has consulted Dr. Vivek Figueroa of vascular surgery.      06/25/23  Patient reporting she continues to have significant pain in her bilateral lower extremities with no significant improvement following administration of Medrol Dosepak.  Overall overall patient appears comfortable.  MRI shows osteomyelitis of the first and second toes.  Patient is white count is stable.    I personally reviewed the CT aortogram which per my read shows thrombosed by iliac stent.    I discussed the case with Dr. Figueroa following patient's surgery.  He decided to place intra-arterial catheter for lysis therapy with alteplase.  He will be taking the patient back to the operating room tomorrow for a lysis check.  Patient will need to be admitted to the IMCU versus ICU for the lytic therapy.  I have reached out to the intensivist to assist with transfer from the PACU.    I have discussed this patient's plan of care and discharge plan at IDT rounds today with Case Management, Nursing, Nursing leadership, and other members of the IDT team.    Consultants/Specialty  vascular surgery    Code Status  Full Code    Disposition  The patient is not medically cleared for discharge to home or a post-acute facility.  Anticipate discharge to: home with close outpatient follow-up    I have placed the appropriate orders for post-discharge needs.    Review of Systems  Review of Systems   Constitutional:  Negative for chills and fever.   Respiratory:  Negative for cough and shortness of breath.    Cardiovascular:  Negative for chest pain and palpitations.   Gastrointestinal:  Negative for abdominal pain, nausea and  vomiting.   Genitourinary:  Negative for dysuria and hematuria.   Musculoskeletal:  Positive for back pain, joint pain, myalgias and neck pain.   Neurological:  Positive for headaches. Negative for dizziness.   Psychiatric/Behavioral:  Positive for depression. The patient is nervous/anxious.         Physical Exam  Temp:  [35.9 °C (96.7 °F)-36.4 °C (97.5 °F)] 35.9 °C (96.7 °F)  Pulse:  [61-77] 61  Resp:  [16-18] 16  BP: (124-129)/(71-79) 125/72  SpO2:  [94 %-96 %] 95 %    Physical Exam  Vitals and nursing note reviewed.   Constitutional:       General: She is not in acute distress.     Appearance: Normal appearance. She is obese. She is not ill-appearing or diaphoretic.      Comments: No apparent distress   HENT:      Head: Normocephalic and atraumatic.      Mouth/Throat:      Mouth: Mucous membranes are moist.      Pharynx: Oropharynx is clear. No oropharyngeal exudate.   Eyes:      General:         Right eye: No discharge.         Left eye: No discharge.      Conjunctiva/sclera: Conjunctivae normal.      Pupils: Pupils are equal, round, and reactive to light.   Cardiovascular:      Rate and Rhythm: Normal rate and regular rhythm.      Pulses: Normal pulses.      Heart sounds: Normal heart sounds. No murmur heard.  Pulmonary:      Effort: Pulmonary effort is normal. No respiratory distress.      Breath sounds: Normal breath sounds.   Abdominal:      General: Abdomen is flat. Bowel sounds are normal. There is no distension.      Palpations: Abdomen is soft.      Tenderness: There is no abdominal tenderness.   Musculoskeletal:         General: Tenderness present.      Cervical back: Neck supple. No tenderness.      Right lower leg: No edema.      Left lower leg: No edema.   Skin:     Coloration: Skin is pale.      Findings: Rash (Scaling with erythema in the bilateral extremities and also in the hands improving) present.   Neurological:      Mental Status: She is alert and oriented to person, place, and time.       Motor: No weakness.      Comments: Patient has full strength in the upper and lower extremities.  Sensation is intact to light touch in both the upper and lower extremities.   Psychiatric:         Attention and Perception: Attention normal.         Mood and Affect: Mood is anxious. Affect is labile.         Speech: Speech normal.         Behavior: Behavior is cooperative.         Thought Content: Thought content normal.         Cognition and Memory: Cognition normal.         Judgment: Judgment normal.         Fluids  No intake or output data in the 24 hours ending 06/25/23 1530      Laboratory  Recent Labs     06/23/23  1942   WBC 10.2   RBC 4.02*   HEMOGLOBIN 11.0*   HEMATOCRIT 34.4*   MCV 85.6   MCH 27.4   MCHC 32.0*   RDW 44.7   PLATELETCT 227   MPV 9.5         Recent Labs     06/25/23  1011   APTT 27.5   INR 1.13               Imaging  US-EXTREMITY ARTERY LOWER BILAT         CT-CTA AORTA-RO WITH & W/O-POST PROCESS   Final Result      1.  Infrarenal abdominal aortic occlusion with bilateral iliac stent occlusion. There is considerable soft and calcified plaque but no aneurysm or dissection      2.  Bilateral reconstitution of external iliac arteries from internal iliac filling. Bilateral posterior tibialis contrast opacification at the level of the ankles. Dorsalis pedis lack of contrast which is more likely from the phase of contrast    enhancement than bilateral occlusion. No focal lower extremity stenosis is seen      3.  Nonspecific right lower lobe mildly enlarged peribronchial lymph node. Consider follow-up CT in 3-6 months to confirm resolution.      3D angiographic/MIP images of the vasculature confirm the vascular findings as described above.      MR-FOOT-WITH & W/O RIGHT   Final Result      1.  Second terminal tuft osteomyelitis without evidence of septic arthropathy or abscess      2.  Dysmorphic first distal phalanx terminal tuft contours with geographic margination suggesting chronic abnormality as  can be seen with chronic/prior osteomyelitis. There is no adjacent edema to indicate acute osteomyelitis.      3.  Second metatarsal head Freiberg's infraction/avascular necrosis and there is no unstable fragment or evidence of osteoarthritis      US-BENI SINGLE LEVEL BILAT   Final Result      DX-FOOT-COMPLETE 3+ RIGHT   Final Result      First and second distal toe skin ulceration which could obscure underlying early osteomyelitis. If there is high clinical concern, MRI could be obtained to further evaluate      MR-LUMBAR SPINE-WITH & W/O   Final Result      1.  Multilevel multifactorial degenerative changes   2.  No areas of abnormal enhancement   3.  No areas of high-grade central canal narrowing   4.  Areas of central canal and neural foraminal narrowing as described above      DX-FOOT-2- RIGHT   Final Result         1.  No acute traumatic bony injury. No destructive osseous process is identified, note however that plain film can be insensitive for evaluation of osteomyelitis and bone scan would offer improved diagnostic sensitivity as clinically appropriate.   2.  Plantar and Achilles calcaneal bone spurs.      IR-ABDOMINAL AORTA-WITH RUNOFF    (Results Pending)        Assessment/Plan  * Complaints of weakness of lower extremity- (present on admission)  Assessment & Plan  Subjective weakness of bilateral LE  Power 4-5/5 in bilateral lower extremities  Received Vancomycin/Rocephin PTA. Hold further abx  Neuro checks every 4 hours   CTA with run off at OSH showing patent stents  C/w plavix and asa     MRI per my read shows no significant stenosis or disc herniation.     Case seen by iliac stents noted to be thrombosed.  Intra-arterial catheter placed for lytic therapy on 6/25/2023 in the operating room.  Uncertain whether the latter may be contributing to her pain.      Thrombosed aortic conduit, initial encounter ([facility])- (present on admission)  Assessment & Plan  CT aortogram shows thrombosis of the kissing by  iliac stent.  Patient states that she has been taking aspirin.    Patient was taken to the operating room on 6/25/2023 by Dr. Vivek Figueroa for thrombectomy.  Intra-arterial catheter placed with lytic therapy with alteplase.    Admit to IMCU versus ICU.    Subacute osteomyelitis of right foot (HCC)- (present on admission)  Assessment & Plan  MRI of the right foot shows second terminal tuft osteomyelitis without evidence of septic arthropathy or abscess.  Dysmorphic first distal phalanx terminal tuft suggestive of chronic abnormality likely chronic osteomyelitis.    LPS following  Orthopedic surgery versus vascular surgery in terms of amputation  Hold off antibiotics for now since normal white count.    Discussed with Dr. Figueroa.  Would like to prioritize opening of vessels to restore perfusion prior to addressing dry gangrene.    Intractable back pain- (present on admission)  Assessment & Plan  Acute on chronic.  No signs of cauda equina clinically or or radiographically.    Trial of Medrol Dosepak  Continue home MS Contin 15 mg every 12 hours  Oxycodone as needed for breakthrough  I have ordered IV Dilaudid as needed for breakthrough pain.    Dry gangrene (HCC)- (present on admission)  Assessment & Plan    X-ray per my read shows no bony fractures.  No obvious erosions of the bone consistent with osteomyelitis.    Please see subacute osteomyelitis section    Psoriasis- (present on admission)  Assessment & Plan  Improving with triamcinolone cream.    Continue triamcinolone cream.    Chronic narcotic dependence (HCC)- (present on admission)  Assessment & Plan  As per history.    Continue home MS Contin 15 mg twice a day.    History of procedure for peripheral vascular disease- (present on admission)  Assessment & Plan  History of stent placement of right common iliac artery and was placed on dual antiplatelet therapy with aspirin and Plavix  CT aortogram showing thrombosed, iliac stents.  Taken to the operating room  for thrombectomy on 6/25/2023.    Continue aspirin and Plavix.  Lytic therapy as per vascular surgery    Depression- (present on admission)  Assessment & Plan  As per history.      I have started the patient back on duloxetine for pain  Atarax as needed for anxiety      Hyperlipidemia- (present on admission)  Assessment & Plan  Continue with statin    Hypertension- (present on admission)  Assessment & Plan  Continue with home metoprolol, Lasix, amlodipine         VTE prophylaxis: enoxaparin ppx    I have performed a physical exam and reviewed and updated ROS and Plan today (6/25/2023). In review of yesterday's note (6/24/2023), there are no changes except as documented above.

## 2023-06-25 NOTE — ANESTHESIA TIME REPORT
Anesthesia Start and Stop Event Times     Date Time Event    6/25/2023 1346 Ready for Procedure     1405 Anesthesia Start     1516 Anesthesia Stop        Responsible Staff  06/25/23    Name Role Begin End    Olive Quispe M.D. Anesth 1405 1516        Overtime Reason:  no overtime (within assigned shift)    Comments:

## 2023-06-25 NOTE — CARE PLAN
Problem: Pain - Standard  Goal: Alleviation of pain or a reduction in pain to the patient’s comfort goal  6/25/2023 1428 by Ml Evangelista R.N.  Outcome: Progressing     Problem: Knowledge Deficit - Standard  Goal: Patient and family/care givers will demonstrate understanding of plan of care, disease process/condition, diagnostic tests and medications  Outcome: Progressing  Updated pt. On POC, surgery today.   The patient is Stable - Low risk of patient condition declining or worsening    Shift Goals  Clinical Goals: CTA  Patient Goals: Rest  Family Goals: NA    Progress made toward(s) clinical / shift goals:      Patient is not progressing towards the following goals:

## 2023-06-25 NOTE — PROGRESS NOTES
Vascular surgery    CTA reviewed  Bilateral kissing iliac stent thrombosis    Plan-     To the hybrid endovascular suite for attempted catheter directed thrombolysis of stents  If unsuccessful we will proceed with open thrombectomy and any other necessary procedures to reestablish inflow.  We will likely not proceed with any type of direct aortic reconstruction at this time that we will require laparotomy.  Further planning and discussions will need to be had before proceeding into an aorto-bifemoral reconstruction     I discussed the risk benefits alternatives indications with the patient's clinical situation and my recommendations.  Patient agrees to proceed.    Vivek Figueroa MD

## 2023-06-25 NOTE — CONSULTS
Vascular Surgery          New Patient Consultation    Patient:Lorene Woodard  MRN:[id number]    Date: 6/24/2023    Referring Provider: Naeem Fuentes M.d.    Consulting Physician: Vivek Figueroa MD  _____________________________________________________    Reason for consultation:  Weakness numbness bilateral lower extremities history of recent vascular intervention    HPI:  This is a 52 y.o. female who earlier this month underwent bilateral iliac artery angioplasty and stenting in kissing fashion.  The patient had a successful result from that.  She does have some chronic dry gangrene at the tip of her right great toe.  Patient was discharged earlier this month with good perfusion.  Patient reports that on Wednesday she began developing some numbness in her left foot which is the contralateral foot to the previous issue.  She reported that she has had some bilateral issues.  Felt concerned and came into the hospital.  The patient's feet are adequately perfused at rest on exam.  She has good motor and sensation present.  She does have some dry gangrene at the tip of her great toe on the right foot.  Patient is obese exam is somewhat difficult with respect to obtaining a good vascular exam.  The patient reports that a duplex scan was just performed and she reported to the patient that the stents look good however the formal report has not been published.    Past Medical History:   Diagnosis Date    CAD (coronary artery disease) December 2012    NSTEMI. Coronary angiogram with 60% LAD stenosis, 40% stenosis proximal LAD, 20% stenosis mid circumflex.    Depression     Hyperlipidemia     Hypertension     Borderline    Psoriasis     Shortness of breath February 2014    Echocardiogram with normal LV size, LVEF 60-65%, no valvular abnormalities.    Smoking        History reviewed. No pertinent surgical history.    Current Facility-Administered Medications   Medication Dose Route Frequency Provider Last Rate  Last Admin    amLODIPine (NORVASC) tablet 5 mg  5 mg Oral DAILY Ceasar Bustamante M.D.   5 mg at 06/24/23 0514    aspirin (ASA) chewable tab 81 mg  81 mg Oral DAILY Ceasar Bustamante M.D.   81 mg at 06/24/23 0514    atorvastatin (LIPITOR) tablet 40 mg  40 mg Oral DAILY Ceasar Bustamante M.D.   40 mg at 06/24/23 0514    clopidogrel (PLAVIX) tablet 75 mg  75 mg Oral DAILY Ceasar Bustamante M.D.   75 mg at 06/24/23 0514    furosemide (LASIX) tablet 40 mg  40 mg Oral DAILY Ceasar Bustamante M.D.   40 mg at 06/24/23 0513    hydrOXYzine HCl (ATARAX) tablet 25 mg  25 mg Oral Q6HRS PRN Ceasar Bustamante M.D.        metoprolol SR (TOPROL XL) tablet 100 mg  100 mg Oral DAILY Ceasar Bustamante M.D.   100 mg at 06/24/23 0514    morphine ER (MS CONTIN) tablet 15 mg  15 mg Oral Q12HRS Ceasar Bustamante M.D.   15 mg at 06/24/23 0514    oxyCODONE immediate release (ROXICODONE) tablet 10 mg  10 mg Oral Q6HRS PRN Ceasar Bustamante M.D.   10 mg at 06/24/23 1315    potassium chloride SA (Kdur) tablet 20 mEq  20 mEq Oral DAILY Ceasar Bustamante M.D.   20 mEq at 06/24/23 0514    pregabalin (LYRICA) capsule 300 mg  300 mg Oral BID Ceasar Bustamante M.D.   300 mg at 06/24/23 0513    [START ON 6/25/2023] methylPREDNISolone (MEDROL) 4 mg tablet  4 mg Oral 4X/DAY WITH MEALS + NIGHTLY Naeem Fuentes M.D.        Followed by    [START ON 6/25/2023] methylPREDNISolone (MEDROL) 4 mg tablet  8 mg Oral 4X/DAY WITH MEALS + NIGHTLY Naeem Fuentes M.D.        Followed by    [START ON 6/26/2023] methylPREDNISolone (MEDROL) 4 mg tablet  4 mg Oral 4X/DAY WITH MEALS + NIGHTLY Naeem Fuentes M.D.        Followed by    [START ON 6/27/2023] methylPREDNISolone (MEDROL) 4 mg tablet  4 mg Oral TID WITH MEALS Naeem Fuentes M.D.        Followed by    [START ON 6/28/2023] methylPREDNISolone (MEDROL) 4 mg tablet  4 mg Oral BID WITH MEALS Naeem Fuentes M.D.        DULoxetine (CYMBALTA) capsule 30 mg  30 mg Oral DAILY Naeem Fuentes M.D.   30 mg at 06/24/23 1315    enoxaparin (Lovenox) inj 40 mg  40  mg Subcutaneous DAILY AT 1800 Naeem Fuentes M.D.        senna-docusate (PERICOLACE or SENOKOT S) 8.6-50 MG per tablet 2 Tablet  2 Tablet Oral BID Ceasar Bustamante M.D.   2 Tablet at 06/24/23 0513    And    polyethylene glycol/lytes (MIRALAX) PACKET 1 Packet  1 Packet Oral QDAY NATANAEL Bustamante M.D.        And    magnesium hydroxide (MILK OF MAGNESIA) suspension 30 mL  30 mL Oral QDAY NATANAEL Bustamante M.D.        And    bisacodyl (DULCOLAX) suppository 10 mg  10 mg Rectal QDAY NATANAEL Bustamante M.D.        ondansetron (ZOFRAN) syringe/vial injection 4 mg  4 mg Intravenous Q4HRS NATANAEL Bustamante M.D.        ondansetron (ZOFRAN ODT) dispertab 4 mg  4 mg Oral Q4HRS NATANAEL Bustamante M.D.        promethazine (PHENERGAN) tablet 12.5-25 mg  12.5-25 mg Oral Q4HRS NATANAEL Bustamante M.D.        promethazine (PHENERGAN) suppository 12.5-25 mg  12.5-25 mg Rectal Q4HRS NATANAEL Bustamante M.D.        prochlorperazine (COMPAZINE) injection 5-10 mg  5-10 mg Intravenous Q4HRS NATANAEL Bustamante M.D.           Social History     Socioeconomic History    Marital status: Single     Spouse name: Not on file    Number of children: Not on file    Years of education: Not on file    Highest education level: Not on file   Occupational History    Not on file   Tobacco Use    Smoking status: Former     Packs/day: 0.00     Types: Cigarettes    Smokeless tobacco: Never   Vaping Use    Vaping Use: Never used   Substance and Sexual Activity    Alcohol use: Yes     Comment: rarely    Drug use: Not Currently    Sexual activity: Not on file   Other Topics Concern    Not on file   Social History Narrative    Not on file     Social Determinants of Health     Financial Resource Strain: Not on file   Food Insecurity: Not on file   Transportation Needs: Not on file   Physical Activity: Not on file   Stress: Not on file   Social Connections: Not on file   Intimate Partner Violence: Not on file   Housing Stability: Not on file       Family  "History   Problem Relation Age of Onset    Heart Disease Sister        Allergies:  Patient has no known allergies.    Review of Systems:    Constitutional: Patient reports numbness bilateral feet left worse than right    Physical Exam:  /69   Pulse 77   Temp 36.3 °C (97.3 °F) (Temporal)   Resp 17   Ht 1.702 m (5' 7\")   Wt 99.8 kg (220 lb)   LMP 03/13/2017   SpO2 94%   BMI 34.46 kg/m²     Constitutional: Alert, oriented, no acute distress  HEENT:  Normocephalic and atraumatic, EOMI  Neck:   Supple, no JVD,   Cardiovascular: Regular rate and rhythm,   Pulmonary:  Good air entry bilaterally,    Abdominal:  Soft, non-tender, non-distended         Musculoskeletal: No tenderness, no deformity  Neurological:  CN II-XII grossly intact, no focal deficits  Skin:   Skin is warm and dry. No rash noted.  Vascular exam: Bilateral feet or adequately perfused at rest.  Absent pedal pulses but patient has good motor function present.  There is no mottling or evidence of acute critical limb ischemia.       Labs:  Recent Labs     06/23/23 1942   WBC 10.2   RBC 4.02*   HEMOGLOBIN 11.0*   HEMATOCRIT 34.4*   MCV 85.6   MCH 27.4   MCHC 32.0*   RDW 44.7   PLATELETCT 227   MPV 9.5                     Radiology:  Current imaging is pending      Assessment/Plan:   -Await results of formal arterial duplex however I have a low suspicion that the numbness is the results of ischemia based on exam.  Patient does have an MRI with significant abnormalities associated with the foramen.    Further recommendations will be based on the results of the noninvasive arterial studies    Vivek Figueroa MD  [facility]   [place] preferred or call my office [phone]  __________________________________________________________________  Patient:Lorene Woodard   MRN:[id number]   CSN:[id number]         "

## 2023-06-25 NOTE — OP REPORT
[facility] OPERATIVE NOTE    06/25/23      PRE-OPERATIVE DIAGNOSIS -     Bilateral lower extremity ischemia  Thrombosis bilateral iliac kissing stents    POST-OPERATIVE DIAGNOSIS -same    PROCEDURE PERFORMED -     Ultrasound-guided access bilateral common femoral arteries  Catheter placement aorta x2  Pelvic angiogram  Catheter directed thrombolysis bilateral common iliac arteries    SURGEON - Vivek Figueroa M.D.    ASSISTANT - None    TYPE OF ANESTHESIA -  General     ANESTHESIOLOGIST  - Anesthesiologist: Olive Quispe M.D.     SPECIMENS -none    INDICATIONS - 52 y.o. [unfilled]     PROCEDURE IN DETAIL -     Patient was taken to the hybrid endovascular suite at [facility] and placed in the supine position.  After adequate anesthesia the patient's abdomen bilateral groins and thighs were prepped and draped in sterile fashion.  Using ultrasound guidance a thinwall access needle was inserted into the common femoral arteries bilaterally and using a Seldinger technique a 4 [place] sheaths were placed.  Under fluoroscopic guidance J-wire's were successfully advanced through the stent and through the soft thrombus into the aorta.  Bilateral 10 cm multi hole thrombolytic catheters were advanced across the areas of occlusion of the stents.  A retrograde pelvic angiogram was performed demonstrating the occluded stents.  Once the catheters were in proper position and secured to the skin tPA will be initiated through both lytic catheters at 0.5 mg/h.  Side ports for the sheaths will be dripped with heparin at 250 units/h each.  Patient was transferred to the recovery room and will be taken to the intermediate care unit for thrombolytic therapy.      _______________________  Vivek Figueroa M.D.

## 2023-06-26 ENCOUNTER — APPOINTMENT (OUTPATIENT)
Dept: RADIOLOGY | Facility: MEDICAL CENTER | Age: 53
DRG: 253 | End: 2023-06-26
Attending: SURGERY
Payer: COMMERCIAL

## 2023-06-26 ENCOUNTER — ANESTHESIA EVENT (OUTPATIENT)
Dept: SURGERY | Facility: MEDICAL CENTER | Age: 53
DRG: 253 | End: 2023-06-26
Payer: COMMERCIAL

## 2023-06-26 ENCOUNTER — ANESTHESIA (OUTPATIENT)
Dept: SURGERY | Facility: MEDICAL CENTER | Age: 53
DRG: 253 | End: 2023-06-26
Payer: COMMERCIAL

## 2023-06-26 LAB
ABO + RH BLD: NORMAL
ANION GAP SERPL CALC-SCNC: 12 MMOL/L (ref 7–16)
APTT PPP: 25.3 SEC (ref 24.7–36)
APTT PPP: 28.4 SEC (ref 24.7–36)
APTT PPP: 31.7 SEC (ref 24.7–36)
APTT PPP: 33.2 SEC (ref 24.7–36)
BASOPHILS # BLD AUTO: 0.2 % (ref 0–1.8)
BASOPHILS # BLD AUTO: 0.2 % (ref 0–1.8)
BASOPHILS # BLD AUTO: 0.3 % (ref 0–1.8)
BASOPHILS # BLD: 0.02 K/UL (ref 0–0.12)
BUN SERPL-MCNC: 16 MG/DL (ref 8–22)
CALCIUM SERPL-MCNC: 8.8 MG/DL (ref 8.5–10.5)
CHLORIDE SERPL-SCNC: 102 MMOL/L (ref 96–112)
CO2 SERPL-SCNC: 25 MMOL/L (ref 20–33)
CREAT SERPL-MCNC: 0.58 MG/DL (ref 0.5–1.4)
EOSINOPHIL # BLD AUTO: 0 K/UL (ref 0–0.51)
EOSINOPHIL # BLD AUTO: 0 K/UL (ref 0–0.51)
EOSINOPHIL # BLD AUTO: 0.02 K/UL (ref 0–0.51)
EOSINOPHIL NFR BLD: 0 % (ref 0–6.9)
EOSINOPHIL NFR BLD: 0 % (ref 0–6.9)
EOSINOPHIL NFR BLD: 0.3 % (ref 0–6.9)
ERYTHROCYTE [DISTWIDTH] IN BLOOD BY AUTOMATED COUNT: 41.6 FL (ref 35.9–50)
ERYTHROCYTE [DISTWIDTH] IN BLOOD BY AUTOMATED COUNT: 42.3 FL (ref 35.9–50)
ERYTHROCYTE [DISTWIDTH] IN BLOOD BY AUTOMATED COUNT: 42.4 FL (ref 35.9–50)
FIBRINOGEN PPP-MCNC: 240 MG/DL (ref 215–460)
FIBRINOGEN PPP-MCNC: 245 MG/DL (ref 215–460)
FIBRINOGEN PPP-MCNC: 249 MG/DL (ref 215–460)
GFR SERPLBLD CREATININE-BSD FMLA CKD-EPI: 109 ML/MIN/1.73 M 2
GLUCOSE BLD STRIP.AUTO-MCNC: 103 MG/DL (ref 65–99)
GLUCOSE SERPL-MCNC: 94 MG/DL (ref 65–99)
HCT VFR BLD AUTO: 33.7 % (ref 37–47)
HCT VFR BLD AUTO: 34.2 % (ref 37–47)
HCT VFR BLD AUTO: 34.4 % (ref 37–47)
HGB BLD-MCNC: 11.1 G/DL (ref 12–16)
HGB BLD-MCNC: 11.1 G/DL (ref 12–16)
HGB BLD-MCNC: 11.2 G/DL (ref 12–16)
IMM GRANULOCYTES # BLD AUTO: 0.07 K/UL (ref 0–0.11)
IMM GRANULOCYTES # BLD AUTO: 0.08 K/UL (ref 0–0.11)
IMM GRANULOCYTES # BLD AUTO: 0.1 K/UL (ref 0–0.11)
IMM GRANULOCYTES NFR BLD AUTO: 0.9 % (ref 0–0.9)
IMM GRANULOCYTES NFR BLD AUTO: 1 % (ref 0–0.9)
IMM GRANULOCYTES NFR BLD AUTO: 1 % (ref 0–0.9)
INR PPP: 1.17 (ref 0.87–1.13)
INR PPP: 1.19 (ref 0.87–1.13)
INR PPP: 1.27 (ref 0.87–1.13)
LYMPHOCYTES # BLD AUTO: 1.24 K/UL (ref 1–4.8)
LYMPHOCYTES # BLD AUTO: 1.56 K/UL (ref 1–4.8)
LYMPHOCYTES # BLD AUTO: 1.69 K/UL (ref 1–4.8)
LYMPHOCYTES NFR BLD: 12.7 % (ref 22–41)
LYMPHOCYTES NFR BLD: 19.5 % (ref 22–41)
LYMPHOCYTES NFR BLD: 21.9 % (ref 22–41)
MCH RBC QN AUTO: 27.4 PG (ref 27–33)
MCH RBC QN AUTO: 27.5 PG (ref 27–33)
MCH RBC QN AUTO: 28 PG (ref 27–33)
MCHC RBC AUTO-ENTMCNC: 32.3 G/DL (ref 32.2–35.5)
MCHC RBC AUTO-ENTMCNC: 32.5 G/DL (ref 32.2–35.5)
MCHC RBC AUTO-ENTMCNC: 33.2 G/DL (ref 32.2–35.5)
MCV RBC AUTO: 84.3 FL (ref 81.4–97.8)
MCV RBC AUTO: 84.4 FL (ref 81.4–97.8)
MCV RBC AUTO: 85.1 FL (ref 81.4–97.8)
MONOCYTES # BLD AUTO: 0.41 K/UL (ref 0–0.85)
MONOCYTES # BLD AUTO: 0.61 K/UL (ref 0–0.85)
MONOCYTES # BLD AUTO: 0.63 K/UL (ref 0–0.85)
MONOCYTES NFR BLD AUTO: 4.2 % (ref 0–13.4)
MONOCYTES NFR BLD AUTO: 7 % (ref 0–13.4)
MONOCYTES NFR BLD AUTO: 8.8 % (ref 0–13.4)
NEUTROPHILS # BLD AUTO: 4.82 K/UL (ref 1.82–7.42)
NEUTROPHILS # BLD AUTO: 6.27 K/UL (ref 1.82–7.42)
NEUTROPHILS # BLD AUTO: 7.98 K/UL (ref 1.82–7.42)
NEUTROPHILS NFR BLD: 67.7 % (ref 44–72)
NEUTROPHILS NFR BLD: 72.4 % (ref 44–72)
NEUTROPHILS NFR BLD: 81.9 % (ref 44–72)
NRBC # BLD AUTO: 0 K/UL
NRBC BLD-RTO: 0 /100 WBC (ref 0–0.2)
POTASSIUM SERPL-SCNC: 3.9 MMOL/L (ref 3.6–5.5)
PROTHROMBIN TIME: 14.8 SEC (ref 12–14.6)
PROTHROMBIN TIME: 14.9 SEC (ref 12–14.6)
PROTHROMBIN TIME: 15.7 SEC (ref 12–14.6)
RBC # BLD AUTO: 4 M/UL (ref 4.2–5.4)
RBC # BLD AUTO: 4.04 M/UL (ref 4.2–5.4)
RBC # BLD AUTO: 4.05 M/UL (ref 4.2–5.4)
SODIUM SERPL-SCNC: 139 MMOL/L (ref 135–145)
UFH PPP CHRO-ACNC: <0.1 IU/ML
WBC # BLD AUTO: 7.1 K/UL (ref 4.8–10.8)
WBC # BLD AUTO: 8.7 K/UL (ref 4.8–10.8)
WBC # BLD AUTO: 9.8 K/UL (ref 4.8–10.8)

## 2023-06-26 PROCEDURE — 85048 AUTOMATED LEUKOCYTE COUNT: CPT

## 2023-06-26 PROCEDURE — 700111 HCHG RX REV CODE 636 W/ 250 OVERRIDE (IP): Performed by: SURGERY

## 2023-06-26 PROCEDURE — 160036 HCHG PACU - EA ADDL 30 MINS PHASE I: Performed by: SURGERY

## 2023-06-26 PROCEDURE — 85520 HEPARIN ASSAY: CPT

## 2023-06-26 PROCEDURE — 99233 SBSQ HOSP IP/OBS HIGH 50: CPT | Performed by: HOSPITALIST

## 2023-06-26 PROCEDURE — 82962 GLUCOSE BLOOD TEST: CPT

## 2023-06-26 PROCEDURE — 700111 HCHG RX REV CODE 636 W/ 250 OVERRIDE (IP): Performed by: INTERNAL MEDICINE

## 2023-06-26 PROCEDURE — C1894 INTRO/SHEATH, NON-LASER: HCPCS | Performed by: SURGERY

## 2023-06-26 PROCEDURE — 37214 CESSJ THERAPY CATH REMOVAL: CPT | Performed by: SURGERY

## 2023-06-26 PROCEDURE — 700102 HCHG RX REV CODE 250 W/ 637 OVERRIDE(OP): Performed by: INTERNAL MEDICINE

## 2023-06-26 PROCEDURE — C1887 CATHETER, GUIDING: HCPCS | Performed by: SURGERY

## 2023-06-26 PROCEDURE — B41C1ZZ FLUOROSCOPY OF PELVIC ARTERIES USING LOW OSMOLAR CONTRAST: ICD-10-PCS | Performed by: SURGERY

## 2023-06-26 PROCEDURE — 85018 HEMOGLOBIN: CPT

## 2023-06-26 PROCEDURE — 85041 AUTOMATED RBC COUNT: CPT

## 2023-06-26 PROCEDURE — 700102 HCHG RX REV CODE 250 W/ 637 OVERRIDE(OP): Performed by: STUDENT IN AN ORGANIZED HEALTH CARE EDUCATION/TRAINING PROGRAM

## 2023-06-26 PROCEDURE — 160027 HCHG SURGERY MINUTES - 1ST 30 MINS LEVEL 2: Performed by: SURGERY

## 2023-06-26 PROCEDURE — 85730 THROMBOPLASTIN TIME PARTIAL: CPT

## 2023-06-26 PROCEDURE — 36620 INSERTION CATHETER ARTERY: CPT | Performed by: ANESTHESIOLOGY

## 2023-06-26 PROCEDURE — 700101 HCHG RX REV CODE 250: Performed by: ANESTHESIOLOGY

## 2023-06-26 PROCEDURE — A9270 NON-COVERED ITEM OR SERVICE: HCPCS | Performed by: STUDENT IN AN ORGANIZED HEALTH CARE EDUCATION/TRAINING PROGRAM

## 2023-06-26 PROCEDURE — 160009 HCHG ANES TIME/MIN: Performed by: SURGERY

## 2023-06-26 PROCEDURE — 770000 HCHG ROOM/CARE - INTERMEDIATE ICU *

## 2023-06-26 PROCEDURE — 85384 FIBRINOGEN ACTIVITY: CPT | Mod: 91

## 2023-06-26 PROCEDURE — 160035 HCHG PACU - 1ST 60 MINS PHASE I: Performed by: SURGERY

## 2023-06-26 PROCEDURE — 85610 PROTHROMBIN TIME: CPT

## 2023-06-26 PROCEDURE — 700111 HCHG RX REV CODE 636 W/ 250 OVERRIDE (IP): Performed by: ANESTHESIOLOGY

## 2023-06-26 PROCEDURE — 700105 HCHG RX REV CODE 258: Performed by: ANESTHESIOLOGY

## 2023-06-26 PROCEDURE — 160038 HCHG SURGERY MINUTES - EA ADDL 1 MIN LEVEL 2: Performed by: SURGERY

## 2023-06-26 PROCEDURE — 700117 HCHG RX CONTRAST REV CODE 255: Performed by: SURGERY

## 2023-06-26 PROCEDURE — C1874 STENT, COATED/COV W/DEL SYS: HCPCS | Performed by: SURGERY

## 2023-06-26 PROCEDURE — 160048 HCHG OR STATISTICAL LEVEL 1-5: Performed by: SURGERY

## 2023-06-26 PROCEDURE — C1760 CLOSURE DEV, VASC: HCPCS | Performed by: SURGERY

## 2023-06-26 PROCEDURE — 85014 HEMATOCRIT: CPT

## 2023-06-26 PROCEDURE — 160002 HCHG RECOVERY MINUTES (STAT): Performed by: SURGERY

## 2023-06-26 PROCEDURE — 047D3DZ DILATION OF LEFT COMMON ILIAC ARTERY WITH INTRALUMINAL DEVICE, PERCUTANEOUS APPROACH: ICD-10-PCS | Performed by: SURGERY

## 2023-06-26 PROCEDURE — 37221 PR REVASCULARIZE ILIAC ARTERY,ANGIOPLASTY/ST*: CPT | Mod: 50 | Performed by: SURGERY

## 2023-06-26 PROCEDURE — C1725 CATH, TRANSLUMIN NON-LASER: HCPCS | Performed by: SURGERY

## 2023-06-26 PROCEDURE — 047C3DZ DILATION OF RIGHT COMMON ILIAC ARTERY WITH INTRALUMINAL DEVICE, PERCUTANEOUS APPROACH: ICD-10-PCS | Performed by: SURGERY

## 2023-06-26 PROCEDURE — 700105 HCHG RX REV CODE 258: Performed by: SURGERY

## 2023-06-26 PROCEDURE — 01926 ANES IVNTL RAD ICR ICAR/AORT: CPT | Performed by: ANESTHESIOLOGY

## 2023-06-26 PROCEDURE — 75710 ARTERY X-RAYS ARM/LEG: CPT | Mod: 26,59 | Performed by: SURGERY

## 2023-06-26 PROCEDURE — A9270 NON-COVERED ITEM OR SERVICE: HCPCS | Performed by: INTERNAL MEDICINE

## 2023-06-26 PROCEDURE — 80048 BASIC METABOLIC PNL TOTAL CA: CPT

## 2023-06-26 DEVICE — IMPLANTABLE DEVICE: Type: IMPLANTABLE DEVICE | Site: GROIN | Status: FUNCTIONAL

## 2023-06-26 DEVICE — ANGIOSEAL 8F VAS CLOS DEV - STS PLUS ANGIOSEAL (10EA/CA): Type: IMPLANTABLE DEVICE | Site: GROIN | Status: FUNCTIONAL

## 2023-06-26 RX ORDER — HEPARIN SODIUM 5000 [USP'U]/100ML
0-30 INJECTION, SOLUTION INTRAVENOUS CONTINUOUS
Status: DISCONTINUED | OUTPATIENT
Start: 2023-06-26 | End: 2023-06-27

## 2023-06-26 RX ORDER — ROCURONIUM BROMIDE 10 MG/ML
INJECTION, SOLUTION INTRAVENOUS PRN
Status: DISCONTINUED | OUTPATIENT
Start: 2023-06-26 | End: 2023-06-26 | Stop reason: SURG

## 2023-06-26 RX ORDER — DEXAMETHASONE SODIUM PHOSPHATE 4 MG/ML
INJECTION, SOLUTION INTRA-ARTICULAR; INTRALESIONAL; INTRAMUSCULAR; INTRAVENOUS; SOFT TISSUE PRN
Status: DISCONTINUED | OUTPATIENT
Start: 2023-06-26 | End: 2023-06-26 | Stop reason: SURG

## 2023-06-26 RX ORDER — HYDRALAZINE HYDROCHLORIDE 20 MG/ML
5 INJECTION INTRAMUSCULAR; INTRAVENOUS
Status: DISCONTINUED | OUTPATIENT
Start: 2023-06-26 | End: 2023-06-26 | Stop reason: HOSPADM

## 2023-06-26 RX ORDER — HEPARIN SODIUM 5000 [USP'U]/100ML
250 INJECTION, SOLUTION INTRAVENOUS CONTINUOUS
Status: DISCONTINUED | OUTPATIENT
Start: 2023-06-26 | End: 2023-06-26

## 2023-06-26 RX ORDER — ACETAMINOPHEN 325 MG/1
650 TABLET ORAL EVERY 4 HOURS PRN
Status: DISCONTINUED | OUTPATIENT
Start: 2023-06-26 | End: 2023-06-28 | Stop reason: HOSPADM

## 2023-06-26 RX ORDER — HYDROMORPHONE HYDROCHLORIDE 1 MG/ML
0.1 INJECTION, SOLUTION INTRAMUSCULAR; INTRAVENOUS; SUBCUTANEOUS
Status: DISCONTINUED | OUTPATIENT
Start: 2023-06-26 | End: 2023-06-26 | Stop reason: HOSPADM

## 2023-06-26 RX ORDER — IPRATROPIUM BROMIDE AND ALBUTEROL SULFATE 2.5; .5 MG/3ML; MG/3ML
3 SOLUTION RESPIRATORY (INHALATION)
Status: DISCONTINUED | OUTPATIENT
Start: 2023-06-26 | End: 2023-06-26 | Stop reason: HOSPADM

## 2023-06-26 RX ORDER — IODIXANOL 270 MG/ML
INJECTION, SOLUTION INTRAVASCULAR
Status: DISCONTINUED | OUTPATIENT
Start: 2023-06-26 | End: 2023-06-26 | Stop reason: HOSPADM

## 2023-06-26 RX ORDER — LIDOCAINE HYDROCHLORIDE 20 MG/ML
INJECTION, SOLUTION EPIDURAL; INFILTRATION; INTRACAUDAL; PERINEURAL PRN
Status: DISCONTINUED | OUTPATIENT
Start: 2023-06-26 | End: 2023-06-26 | Stop reason: SURG

## 2023-06-26 RX ORDER — HEPARIN SODIUM 1000 [USP'U]/ML
INJECTION, SOLUTION INTRAVENOUS; SUBCUTANEOUS PRN
Status: DISCONTINUED | OUTPATIENT
Start: 2023-06-26 | End: 2023-06-26 | Stop reason: HOSPADM

## 2023-06-26 RX ORDER — OXYCODONE HCL 5 MG/5 ML
10 SOLUTION, ORAL ORAL
Status: DISCONTINUED | OUTPATIENT
Start: 2023-06-26 | End: 2023-06-26 | Stop reason: HOSPADM

## 2023-06-26 RX ORDER — HYDROMORPHONE HYDROCHLORIDE 1 MG/ML
1 INJECTION, SOLUTION INTRAMUSCULAR; INTRAVENOUS; SUBCUTANEOUS ONCE
Status: COMPLETED | OUTPATIENT
Start: 2023-06-26 | End: 2023-06-26

## 2023-06-26 RX ORDER — HYDROMORPHONE HYDROCHLORIDE 1 MG/ML
0.4 INJECTION, SOLUTION INTRAMUSCULAR; INTRAVENOUS; SUBCUTANEOUS
Status: DISCONTINUED | OUTPATIENT
Start: 2023-06-26 | End: 2023-06-26 | Stop reason: HOSPADM

## 2023-06-26 RX ORDER — HYDROMORPHONE HYDROCHLORIDE 2 MG/ML
INJECTION, SOLUTION INTRAMUSCULAR; INTRAVENOUS; SUBCUTANEOUS PRN
Status: DISCONTINUED | OUTPATIENT
Start: 2023-06-26 | End: 2023-06-26 | Stop reason: SURG

## 2023-06-26 RX ORDER — METOPROLOL TARTRATE 1 MG/ML
INJECTION, SOLUTION INTRAVENOUS PRN
Status: DISCONTINUED | OUTPATIENT
Start: 2023-06-26 | End: 2023-06-26 | Stop reason: SURG

## 2023-06-26 RX ORDER — HYDROMORPHONE HYDROCHLORIDE 1 MG/ML
0.2 INJECTION, SOLUTION INTRAMUSCULAR; INTRAVENOUS; SUBCUTANEOUS
Status: DISCONTINUED | OUTPATIENT
Start: 2023-06-26 | End: 2023-06-26 | Stop reason: HOSPADM

## 2023-06-26 RX ORDER — HALOPERIDOL 5 MG/ML
1 INJECTION INTRAMUSCULAR
Status: DISCONTINUED | OUTPATIENT
Start: 2023-06-26 | End: 2023-06-26 | Stop reason: HOSPADM

## 2023-06-26 RX ORDER — DIPHENHYDRAMINE HYDROCHLORIDE 50 MG/ML
12.5 INJECTION INTRAMUSCULAR; INTRAVENOUS
Status: DISCONTINUED | OUTPATIENT
Start: 2023-06-26 | End: 2023-06-26 | Stop reason: HOSPADM

## 2023-06-26 RX ORDER — SODIUM CHLORIDE, SODIUM LACTATE, POTASSIUM CHLORIDE, CALCIUM CHLORIDE 600; 310; 30; 20 MG/100ML; MG/100ML; MG/100ML; MG/100ML
INJECTION, SOLUTION INTRAVENOUS
Status: DISCONTINUED | OUTPATIENT
Start: 2023-06-26 | End: 2023-06-26 | Stop reason: SURG

## 2023-06-26 RX ORDER — OXYCODONE HCL 5 MG/5 ML
5 SOLUTION, ORAL ORAL
Status: DISCONTINUED | OUTPATIENT
Start: 2023-06-26 | End: 2023-06-26 | Stop reason: HOSPADM

## 2023-06-26 RX ORDER — ONDANSETRON 2 MG/ML
INJECTION INTRAMUSCULAR; INTRAVENOUS PRN
Status: DISCONTINUED | OUTPATIENT
Start: 2023-06-26 | End: 2023-06-26 | Stop reason: SURG

## 2023-06-26 RX ORDER — ONDANSETRON 2 MG/ML
4 INJECTION INTRAMUSCULAR; INTRAVENOUS
Status: DISCONTINUED | OUTPATIENT
Start: 2023-06-26 | End: 2023-06-26 | Stop reason: HOSPADM

## 2023-06-26 RX ORDER — MEPERIDINE HYDROCHLORIDE 25 MG/ML
12.5 INJECTION INTRAMUSCULAR; INTRAVENOUS; SUBCUTANEOUS
Status: DISCONTINUED | OUTPATIENT
Start: 2023-06-26 | End: 2023-06-26 | Stop reason: HOSPADM

## 2023-06-26 RX ORDER — SODIUM CHLORIDE, SODIUM LACTATE, POTASSIUM CHLORIDE, CALCIUM CHLORIDE 600; 310; 30; 20 MG/100ML; MG/100ML; MG/100ML; MG/100ML
INJECTION, SOLUTION INTRAVENOUS CONTINUOUS
Status: DISCONTINUED | OUTPATIENT
Start: 2023-06-26 | End: 2023-06-26 | Stop reason: HOSPADM

## 2023-06-26 RX ORDER — HEPARIN SODIUM 1000 [USP'U]/ML
40 INJECTION, SOLUTION INTRAVENOUS; SUBCUTANEOUS PRN
Status: DISCONTINUED | OUTPATIENT
Start: 2023-06-26 | End: 2023-06-27

## 2023-06-26 RX ORDER — CEFAZOLIN SODIUM 1 G/3ML
INJECTION, POWDER, FOR SOLUTION INTRAMUSCULAR; INTRAVENOUS PRN
Status: DISCONTINUED | OUTPATIENT
Start: 2023-06-26 | End: 2023-06-26 | Stop reason: SURG

## 2023-06-26 RX ADMIN — ALTEPLASE 0.5 MG/HR: KIT at 07:57

## 2023-06-26 RX ADMIN — DEXAMETHASONE SODIUM PHOSPHATE 8 MG: 4 INJECTION INTRA-ARTICULAR; INTRALESIONAL; INTRAMUSCULAR; INTRAVENOUS; SOFT TISSUE at 17:16

## 2023-06-26 RX ADMIN — METOPROLOL TARTRATE 5 MG: 5 INJECTION INTRAVENOUS at 18:28

## 2023-06-26 RX ADMIN — HYDROMORPHONE HYDROCHLORIDE 1 MG: 2 INJECTION INTRAMUSCULAR; INTRAVENOUS; SUBCUTANEOUS at 17:58

## 2023-06-26 RX ADMIN — HYDROMORPHONE HYDROCHLORIDE 1 MG: 1 INJECTION, SOLUTION INTRAMUSCULAR; INTRAVENOUS; SUBCUTANEOUS at 00:42

## 2023-06-26 RX ADMIN — ROCURONIUM BROMIDE 50 MG: 50 INJECTION, SOLUTION INTRAVENOUS at 17:58

## 2023-06-26 RX ADMIN — HYDROMORPHONE HYDROCHLORIDE 1 MG: 1 INJECTION, SOLUTION INTRAMUSCULAR; INTRAVENOUS; SUBCUTANEOUS at 16:39

## 2023-06-26 RX ADMIN — HEPARIN SODIUM 18 UNITS/KG/HR: 5000 INJECTION, SOLUTION INTRAVENOUS at 21:35

## 2023-06-26 RX ADMIN — HYDROMORPHONE HYDROCHLORIDE 1 MG: 1 INJECTION, SOLUTION INTRAMUSCULAR; INTRAVENOUS; SUBCUTANEOUS at 14:39

## 2023-06-26 RX ADMIN — SODIUM CHLORIDE, POTASSIUM CHLORIDE, SODIUM LACTATE AND CALCIUM CHLORIDE: 600; 310; 30; 20 INJECTION, SOLUTION INTRAVENOUS at 17:03

## 2023-06-26 RX ADMIN — HEPARIN SODIUM 5000 UNITS: 1000 INJECTION, SOLUTION INTRAVENOUS; SUBCUTANEOUS at 17:48

## 2023-06-26 RX ADMIN — OXYCODONE HYDROCHLORIDE 10 MG: 10 TABLET ORAL at 20:12

## 2023-06-26 RX ADMIN — HYDROMORPHONE HYDROCHLORIDE 1 MG: 1 INJECTION, SOLUTION INTRAMUSCULAR; INTRAVENOUS; SUBCUTANEOUS at 12:40

## 2023-06-26 RX ADMIN — NOREPINEPHRINE BITARTRATE 0.1 MCG/KG/MIN: 1 INJECTION, SOLUTION, CONCENTRATE INTRAVENOUS at 17:17

## 2023-06-26 RX ADMIN — PROPOFOL 150 MG: 10 INJECTION, EMULSION INTRAVENOUS at 17:16

## 2023-06-26 RX ADMIN — ACETAMINOPHEN 650 MG: 325 TABLET, FILM COATED ORAL at 23:14

## 2023-06-26 RX ADMIN — PROPOFOL 50 MG: 10 INJECTION, EMULSION INTRAVENOUS at 18:28

## 2023-06-26 RX ADMIN — METHYLPREDNISOLONE 4 MG: 4 TABLET ORAL at 22:48

## 2023-06-26 RX ADMIN — SUGAMMADEX 200 MG: 100 INJECTION, SOLUTION INTRAVENOUS at 18:27

## 2023-06-26 RX ADMIN — HYDROMORPHONE HYDROCHLORIDE 1 MG: 1 INJECTION, SOLUTION INTRAMUSCULAR; INTRAVENOUS; SUBCUTANEOUS at 03:49

## 2023-06-26 RX ADMIN — HYDROMORPHONE HYDROCHLORIDE 1 MG: 1 INJECTION, SOLUTION INTRAMUSCULAR; INTRAVENOUS; SUBCUTANEOUS at 10:36

## 2023-06-26 RX ADMIN — HYDROMORPHONE HYDROCHLORIDE 1 MG: 2 INJECTION INTRAMUSCULAR; INTRAVENOUS; SUBCUTANEOUS at 17:08

## 2023-06-26 RX ADMIN — PROPOFOL 100 MG: 10 INJECTION, EMULSION INTRAVENOUS at 18:35

## 2023-06-26 RX ADMIN — HYDROMORPHONE HYDROCHLORIDE 1 MG: 1 INJECTION, SOLUTION INTRAMUSCULAR; INTRAVENOUS; SUBCUTANEOUS at 06:02

## 2023-06-26 RX ADMIN — ONDANSETRON 4 MG: 2 INJECTION INTRAMUSCULAR; INTRAVENOUS at 18:27

## 2023-06-26 RX ADMIN — TRIAMCINOLONE ACETONIDE: 1 CREAM TOPICAL at 07:58

## 2023-06-26 RX ADMIN — LIDOCAINE HYDROCHLORIDE 100 MG: 20 INJECTION, SOLUTION EPIDURAL; INFILTRATION; INTRACAUDAL at 17:16

## 2023-06-26 RX ADMIN — HYDROMORPHONE HYDROCHLORIDE 1 MG: 1 INJECTION, SOLUTION INTRAMUSCULAR; INTRAVENOUS; SUBCUTANEOUS at 21:17

## 2023-06-26 RX ADMIN — CEFAZOLIN 2 G: 1 INJECTION, POWDER, FOR SOLUTION INTRAMUSCULAR; INTRAVENOUS at 17:17

## 2023-06-26 RX ADMIN — HYDROMORPHONE HYDROCHLORIDE 1 MG: 1 INJECTION, SOLUTION INTRAMUSCULAR; INTRAVENOUS; SUBCUTANEOUS at 08:05

## 2023-06-26 RX ADMIN — ROCURONIUM BROMIDE 50 MG: 50 INJECTION, SOLUTION INTRAVENOUS at 17:16

## 2023-06-26 RX ADMIN — HEPARIN SODIUM 3100 UNITS: 1000 INJECTION, SOLUTION INTRAVENOUS; SUBCUTANEOUS at 21:37

## 2023-06-26 ASSESSMENT — COGNITIVE AND FUNCTIONAL STATUS - GENERAL
SUGGESTED CMS G CODE MODIFIER MOBILITY: CJ
WALKING IN HOSPITAL ROOM: A LITTLE
SUGGESTED CMS G CODE MODIFIER DAILY ACTIVITY: CH
CLIMB 3 TO 5 STEPS WITH RAILING: A LITTLE
DAILY ACTIVITIY SCORE: 24
MOBILITY SCORE: 22

## 2023-06-26 ASSESSMENT — ENCOUNTER SYMPTOMS
CARDIOVASCULAR NEGATIVE: 1
EYES NEGATIVE: 1
POLYDIPSIA: 0
RESPIRATORY NEGATIVE: 1
DEPRESSION: 1
FEVER: 0
ABDOMINAL PAIN: 0
HEADACHES: 1
WEAKNESS: 0
SHORTNESS OF BREATH: 0
CONSTITUTIONAL NEGATIVE: 1
NAUSEA: 0
NERVOUS/ANXIOUS: 1
FOCAL WEAKNESS: 0
COUGH: 0
MYALGIAS: 1
PALPITATIONS: 0
DIZZINESS: 0
BRUISES/BLEEDS EASILY: 0
NECK PAIN: 1
VOMITING: 0
CHILLS: 0
BACK PAIN: 1
HEARTBURN: 0
GASTROINTESTINAL NEGATIVE: 1

## 2023-06-26 ASSESSMENT — PAIN DESCRIPTION - PAIN TYPE
TYPE: ACUTE PAIN
TYPE: CHRONIC PAIN;SURGICAL PAIN
TYPE: SURGICAL PAIN
TYPE: ACUTE PAIN
TYPE: CHRONIC PAIN;SURGICAL PAIN
TYPE: ACUTE PAIN
TYPE: SURGICAL PAIN
TYPE: ACUTE PAIN
TYPE: SURGICAL PAIN;CHRONIC PAIN
TYPE: ACUTE PAIN
TYPE: SURGICAL PAIN

## 2023-06-26 ASSESSMENT — PAIN SCALES - GENERAL: PAIN_LEVEL: 0

## 2023-06-26 ASSESSMENT — FIBROSIS 4 INDEX: FIB4 SCORE: 0.97

## 2023-06-26 NOTE — ANESTHESIA PREPROCEDURE EVALUATION
Case: [id number] Date/Time: 06/26/23 1615    Procedures:       AORTOGRAM, WITH RUNOFF      ANGIOPLASTY, ARTERY, ILIAC, WITH STENT INSERTION      CREATION, BYPASS, ARTERIAL, FEMORAL TO FEMORAL, USING GRAFT    Location: [facility] OR [facility] / SURGERY [facility]    Surgeons: Marlon Kaplan M.D.        Past Medical History:   Diagnosis Date   • CAD (coronary artery disease) December 2012    NSTEMI. Coronary angiogram with 60% LAD stenosis, 40% stenosis proximal LAD, 20% stenosis mid circumflex.   • Depression    • Hyperlipidemia    • Hypertension     Borderline   • Psoriasis    • Shortness of breath February 2014    Echocardiogram with normal LV size, LVEF 60-65%, no valvular abnormalities.   • Smoking      Past Surgical History:   Procedure Laterality Date   • THROMBECTOMY Bilateral 6/25/2023    Procedure: PELVIC ANGIOGRAM, CATHETER PLACEMENT AORTA, LYSIS BILATERAL COMMON ILLIAC ARTERY;  Surgeon: Vivek Figueroa M.D.;  Location: SURGERY [facility];  Service: General     Current Outpatient Medications   Medication Instructions   • amLODIPine (NORVASC) 5 mg, Oral, DAILY, 2 tablets = 5 mg.   • aspirin (ASA) 81 mg, Oral, DAILY   • atorvastatin (LIPITOR) 40 mg, Oral, DAILY   • betamethasone dipropionate 0.05 % Ointment Apply 1g to affected area on palms topically every day.   • celecoxib (CELEBREX) 200 mg, Oral, DAILY   • clopidogrel (PLAVIX) 75 mg, Oral, DAILY   • furosemide (LASIX) 40 mg, Oral, DAILY   • hydrOXYzine HCl (ATARAX) 25 mg, Oral, EVERY 6 HOURS PRN   • lidocaine-prilocaine (EMLA) 2.5-2.5 % Cream 1 g, Topical, PRN   • metoprolol SR (TOPROL XL) 100 mg, Oral, DAILY   • morphine ER (MS CONTIN) 15 mg, Oral, EVERY 12 HOURS   • oxyCODONE immediate release (ROXICODONE) 10 mg, Oral, EVERY 6 HOURS PRN   • potassium chloride SA (KDUR) 20 MEQ Tab CR 20 mEq, Oral, DAILY   • pregabalin (LYRICA) 300 mg, Oral, 2 TIMES DAILY   • Secukinumab 300 mg, Subcutaneous, Every 4 Weeks     Lab Results   Component Value Date/Time    SODIUM 139  06/26/2023 08:11 AM    POTASSIUM 3.9 06/26/2023 08:11 AM    CHLORIDE 102 06/26/2023 08:11 AM    CO2 25 06/26/2023 08:11 AM    GLUCOSE 94 06/26/2023 08:11 AM    BUN 16 06/26/2023 08:11 AM    CREATININE 0.58 06/26/2023 08:11 AM      Lab Results   Component Value Date/Time    WBC 8.7 06/26/2023 06:00 AM    RBC 4.05 (L) 06/26/2023 06:00 AM    HEMOGLOBIN 11.1 (L) 06/26/2023 06:00 AM    HEMATOCRIT 34.2 (L) 06/26/2023 06:00 AM    MCV 84.4 06/26/2023 06:00 AM    MCH 27.4 06/26/2023 06:00 AM    MCHC 32.5 06/26/2023 06:00 AM    MPV 9.5 06/25/2023 05:29 PM    NEUTSPOLYS 72.40 (H) 06/26/2023 06:00 AM    LYMPHOCYTES 19.50 (L) 06/26/2023 06:00 AM    MONOCYTES 7.00 06/26/2023 06:00 AM    EOSINOPHILS 0.00 06/26/2023 06:00 AM    BASOPHILS 0.20 06/26/2023 06:00 AM      Lab Results   Component Value Date/Time    PROTHROMBTM 14.9 (H) 06/26/2023 06:00 AM    INR 1.19 (H) 06/26/2023 06:00 AM          Relevant Problems   PULMONARY   (positive) Shortness of breath      CARDIAC   (positive) AMI (acute myocardial infarction) ([facility])   (positive) Acute lower limb ischemia   (positive) CAD (coronary artery disease)   (positive) Hypertension   (positive) Iliac artery stenosis, right ([facility])   (positive) Thrombosed aortic conduit, initial encounter ([facility])      Other   (positive) Bacteremia   (positive) Chronic narcotic dependence ([facility])   (positive) Chronic pain syndrome   (positive) Complaints of weakness of lower extremity   (positive) Depression   (positive) History of procedure for peripheral vascular disease   (positive) Hyperlipidemia   (positive) Immunocompromised state ([facility])   (positive) Intractable back pain   (positive) Nonhealing surgical wound   (positive) Psoriasis   (positive) Psoriatic arthritis ([facility])   (positive) Subacute osteomyelitis of right foot ([facility])   (positive) Tobacco abuse     TTE 4/2023:  CONCLUSIONS  Normal left ventricular systolic function.  The left ventricular ejection fraction is visually estimated to be 55-60%.  Normal  diastolic function.  The right ventricle is not well visualized.  Unable to estimate right ventricular systolic pressure due to an   inadequate tricuspid regurgitant jet.  Grossly normal left atrial size.  Dilated inferior vena cava without inspiratory collapse.    Physical Exam    Airway   Mallampati: II  TM distance: >3 FB  Neck ROM: full       Cardiovascular - normal exam  Rhythm: regular  Rate: normal  (-) murmur     Dental   (+) upper dentures, lower dentures           Pulmonary   (+) rhonchi, decreased breath sounds     Abdominal   (+) obese     Neurological - normal exam               Anesthesia Plan    ASA 3   ASA physical status 3 criteria: CAD/stents (> 3 months)    Plan - general       Airway plan will be ETT    (Arterial line)      Induction: intravenous    Postoperative Plan: Postoperative administration of opioids is intended.    Pertinent diagnostic labs and testing reviewed    Informed Consent:    Anesthetic plan and risks discussed with patient.    Use of blood products discussed with: patient whom consented to blood products.

## 2023-06-26 NOTE — PROGRESS NOTES
Pt to floor with PACU RN on monitor. Report received from ORLANDO Powell. Pt placed on tele monitor and monitor room notified.

## 2023-06-26 NOTE — PROGRESS NOTES
This patient will be transferred from PACU to the IMCU for TPA catheter infusion and heparin gtt and remains under the care of hospitalist and vascular surgery (who all questions and concerns should be relayed to).  While a critical care consultation has not been requested, we are immediately available if the patient requires critical care in the future.

## 2023-06-26 NOTE — PROGRESS NOTES
0040: Assessed pt's bilateral sheath sites. Right site bloody but soft. Reinforced Dressing. Pulses heard with doppler.  0210: Right femoral sheath site with a small amount of new blood on dressing. Both sites still soft. Pulses heard with doppler.   0401: Bilateral sheath sites both with small amount of new blood on dressing. Both sites remain soft. Right post tibial pulse heard with doppler, left pedal pulse palpable +1.   0609: No new blood noted on either sheath site. Both sites soft. Right post tibial pulse heard with doppler, left pedal pulse palpable +1.

## 2023-06-26 NOTE — OR NURSING
1515   Dr Figueroa at bedside; checked groin lines    Patient crying with pain; medicated as ordered.  Partial relief obtained (10/10 to 6/10 on 10 scale).  Emotional support given for and during continued pain  Patient tolerating fluids well; no complaints of nausea  Patient on O2 @ 2 l/m via nasal cannula  DaughterIgnacia, notified of patient's status and room number  Red raised rash, scabs noted from scratching on bilateral lower legs; states rash due to psoriasis  Palms of both hands have dry scaly skin; states due to psoriasis  Lab drawn from peripheral line left arm    1740  Transferred to room via bed  O2 tank > 50% full  Pertinent post op orders reviewed with receiving RN

## 2023-06-26 NOTE — CARE PLAN
The patient is Watcher - Medium risk of patient condition declining or worsening    Shift Goals  Clinical Goals: Hemodynamic stability, Q4 neuro assessments, vascular improvement  Patient Goals: Pain control  Family Goals: Pain control, comfort    Progress made toward(s) clinical / shift goals:    Problem: Pain - Standard  Goal: Alleviation of pain or a reduction in pain to the patient’s comfort goal  6/26/2023 0219 by Tia Leigh, R.N.  Outcome: Progressing  Flowsheets (Taken 6/26/2023 0112)  Pain Rating Scale (NPRS): 3  6/26/2023 0218 by Tia Leigh R.N.  Outcome: Progressing  Flowsheets (Taken 6/26/2023 0112)  Pain Rating Scale (NPRS): 3     Problem: Knowledge Deficit - Standard  Goal: Patient and family/care givers will demonstrate understanding of plan of care, disease process/condition, diagnostic tests and medications  6/26/2023 0219 by Tia Leigh, R.N.  Outcome: Progressing  6/26/2023 0218 by Tia Leigh, R.N.  Outcome: Progressing     Problem: Skin Integrity  Goal: Skin integrity is maintained or improved  6/26/2023 0219 by Tia Leigh, R.N.  Outcome: Progressing  6/26/2023 0218 by Tia Leigh, R.N.  Outcome: Progressing

## 2023-06-26 NOTE — PROGRESS NOTES
4 Eyes Skin Assessment Completed by ORLANDO Ortega and ORLANDO Gan.    Head WDL  Ears Redness and Blanching  Nose WDL  Mouth WDL  Neck WDL  Breast/Chest WDL  Shoulder Blades WDL  Spine WDL  (R) Arm/Elbow/Hand Psoriasis scales on hand  (L) Arm/Elbow/Hand  Psoriasis scales on hand  Abdomen WDL  Groin Abrasion  Scrotum/Coccyx/Buttocks WDL  (R) Leg Flaky,  Psoriasis scales  (L) Leg Flaky,  Psoriasis scales   (R) Heel/Foot/Toe Redness, Discoloration, and Swelling 1st & 2nd toes black  (L) Heel/Foot/Toe Discoloration and Swelling          Devices In Places Tele Box, Blood Pressure Cuff, Pulse Ox, Li, and Nasal Cannula      Interventions In Place NC W/Ear Foams and Pillows    Possible Skin Injury Yes    Pictures Uploaded Into Epic Yes  Wound Consult Placed Yes  RN Wound Prevention Protocol Ordered Yes

## 2023-06-26 NOTE — WOUND TEAM
Wound consult received stating pt had changed units and needed continued care. Chart and images reviewed. Pt is being followed by Vascular and LPS Service for foot. Pannus and groin assessed and is moist but does not appear to have any fungal like growth. Interdry ordered. Consult completed.    no

## 2023-06-26 NOTE — PROGRESS NOTES
VASCULAR SURGERY SERVICE                        Progress Note  _________________________________    Doing well  TPA still running  Right PT has a brisk doppler signal  Left DP is easily palpable    Planning return to OR today for aortogram, relining of the iliac stents with covered stents, and possible femoral-femoral bypass if needed.    The patient and I, as well as participating family members, discussed the recommendation for surgery as well as alternatives. We discussed the pertinent preoperative, intraoperative, and postoperative aspects of the procedure including anticipated recovery and how that could be affected by potential complications.    We discussed potential risks from the surgery including but not limited to:  Bleeding and possible need for transfusion, if applicable. Infection of the incision or any potential implanted materials. Injury to nearby vessels or nerves. Injury to nearby organs. Risk of xray and contrast exposure. Risks of anesthesia.    We also discussed global risks including but not limited to: Blood clots, Stroke, Heart attack, Pulmonary complications, and not surviving the operation or the recovery.    All questions were answered. They understand and agree to proceed. Informed consent was obtained.       Marlon Kaplan MD  [facility]   Voal[place] preferred or call my office [phone]  __________________________________________________  Patient:Lorene Woodard   MRN:[id number]

## 2023-06-27 PROBLEM — Z98.890: Status: RESOLVED | Noted: 2023-06-23 | Resolved: 2023-06-27

## 2023-06-27 LAB
ANION GAP SERPL CALC-SCNC: 12 MMOL/L (ref 7–16)
BASOPHILS # BLD AUTO: 0.1 % (ref 0–1.8)
BASOPHILS # BLD: 0.01 K/UL (ref 0–0.12)
BUN SERPL-MCNC: 18 MG/DL (ref 8–22)
CALCIUM SERPL-MCNC: 8.9 MG/DL (ref 8.5–10.5)
CHLORIDE SERPL-SCNC: 98 MMOL/L (ref 96–112)
CO2 SERPL-SCNC: 26 MMOL/L (ref 20–33)
CREAT SERPL-MCNC: 0.87 MG/DL (ref 0.5–1.4)
EOSINOPHIL # BLD AUTO: 0 K/UL (ref 0–0.51)
EOSINOPHIL NFR BLD: 0 % (ref 0–6.9)
ERYTHROCYTE [DISTWIDTH] IN BLOOD BY AUTOMATED COUNT: 42.1 FL (ref 35.9–50)
GFR SERPLBLD CREATININE-BSD FMLA CKD-EPI: 80 ML/MIN/1.73 M 2
GLUCOSE SERPL-MCNC: 125 MG/DL (ref 65–99)
HCT VFR BLD AUTO: 33.8 % (ref 37–47)
HGB BLD-MCNC: 11.2 G/DL (ref 12–16)
IMM GRANULOCYTES # BLD AUTO: 0.08 K/UL (ref 0–0.11)
IMM GRANULOCYTES NFR BLD AUTO: 1.1 % (ref 0–0.9)
LYMPHOCYTES # BLD AUTO: 0.97 K/UL (ref 1–4.8)
LYMPHOCYTES NFR BLD: 12.8 % (ref 22–41)
MCH RBC QN AUTO: 27.8 PG (ref 27–33)
MCHC RBC AUTO-ENTMCNC: 33.1 G/DL (ref 32.2–35.5)
MCV RBC AUTO: 83.9 FL (ref 81.4–97.8)
MONOCYTES # BLD AUTO: 0.46 K/UL (ref 0–0.85)
MONOCYTES NFR BLD AUTO: 6.1 % (ref 0–13.4)
NEUTROPHILS # BLD AUTO: 6.03 K/UL (ref 1.82–7.42)
NEUTROPHILS NFR BLD: 79.9 % (ref 44–72)
NRBC # BLD AUTO: 0 K/UL
NRBC BLD-RTO: 0 /100 WBC (ref 0–0.2)
PLATELET # BLD AUTO: 230 K/UL (ref 164–446)
PMV BLD AUTO: 9.5 FL (ref 9–12.9)
POTASSIUM SERPL-SCNC: 4.3 MMOL/L (ref 3.6–5.5)
RBC # BLD AUTO: 4.03 M/UL (ref 4.2–5.4)
SODIUM SERPL-SCNC: 136 MMOL/L (ref 135–145)
UFH PPP CHRO-ACNC: 0.37 IU/ML
WBC # BLD AUTO: 7.6 K/UL (ref 4.8–10.8)

## 2023-06-27 PROCEDURE — 700102 HCHG RX REV CODE 250 W/ 637 OVERRIDE(OP): Performed by: HOSPITALIST

## 2023-06-27 PROCEDURE — 700102 HCHG RX REV CODE 250 W/ 637 OVERRIDE(OP): Performed by: STUDENT IN AN ORGANIZED HEALTH CARE EDUCATION/TRAINING PROGRAM

## 2023-06-27 PROCEDURE — A9270 NON-COVERED ITEM OR SERVICE: HCPCS | Performed by: STUDENT IN AN ORGANIZED HEALTH CARE EDUCATION/TRAINING PROGRAM

## 2023-06-27 PROCEDURE — 99233 SBSQ HOSP IP/OBS HIGH 50: CPT | Performed by: HOSPITALIST

## 2023-06-27 PROCEDURE — 80048 BASIC METABOLIC PNL TOTAL CA: CPT

## 2023-06-27 PROCEDURE — 700111 HCHG RX REV CODE 636 W/ 250 OVERRIDE (IP): Performed by: HOSPITALIST

## 2023-06-27 PROCEDURE — A9270 NON-COVERED ITEM OR SERVICE: HCPCS | Performed by: HOSPITALIST

## 2023-06-27 PROCEDURE — 770006 HCHG ROOM/CARE - MED/SURG/GYN SEMI*

## 2023-06-27 PROCEDURE — 85025 COMPLETE CBC W/AUTO DIFF WBC: CPT

## 2023-06-27 PROCEDURE — A9270 NON-COVERED ITEM OR SERVICE: HCPCS | Performed by: INTERNAL MEDICINE

## 2023-06-27 PROCEDURE — 85520 HEPARIN ASSAY: CPT

## 2023-06-27 PROCEDURE — 700102 HCHG RX REV CODE 250 W/ 637 OVERRIDE(OP): Performed by: INTERNAL MEDICINE

## 2023-06-27 PROCEDURE — 700111 HCHG RX REV CODE 636 W/ 250 OVERRIDE (IP): Performed by: INTERNAL MEDICINE

## 2023-06-27 RX ORDER — HYDROMORPHONE HYDROCHLORIDE 1 MG/ML
0.5 INJECTION, SOLUTION INTRAMUSCULAR; INTRAVENOUS; SUBCUTANEOUS
Status: DISCONTINUED | OUTPATIENT
Start: 2023-06-27 | End: 2023-06-28 | Stop reason: HOSPADM

## 2023-06-27 RX ADMIN — APIXABAN 5 MG: 5 TABLET, FILM COATED ORAL at 18:12

## 2023-06-27 RX ADMIN — HYDROMORPHONE HYDROCHLORIDE 1 MG: 1 INJECTION, SOLUTION INTRAMUSCULAR; INTRAVENOUS; SUBCUTANEOUS at 00:55

## 2023-06-27 RX ADMIN — ASPIRIN 81 MG 81 MG: 81 TABLET ORAL at 04:50

## 2023-06-27 RX ADMIN — TRIAMCINOLONE ACETONIDE: 1 CREAM TOPICAL at 04:53

## 2023-06-27 RX ADMIN — FUROSEMIDE 40 MG: 40 TABLET ORAL at 04:50

## 2023-06-27 RX ADMIN — METOPROLOL SUCCINATE 100 MG: 100 TABLET, EXTENDED RELEASE ORAL at 05:57

## 2023-06-27 RX ADMIN — PREGABALIN 300 MG: 150 CAPSULE ORAL at 17:57

## 2023-06-27 RX ADMIN — POTASSIUM CHLORIDE 20 MEQ: 1500 TABLET, EXTENDED RELEASE ORAL at 04:50

## 2023-06-27 RX ADMIN — SENNOSIDES AND DOCUSATE SODIUM 2 TABLET: 50; 8.6 TABLET ORAL at 17:57

## 2023-06-27 RX ADMIN — AMLODIPINE BESYLATE 5 MG: 10 TABLET ORAL at 04:50

## 2023-06-27 RX ADMIN — METHYLPREDNISOLONE 4 MG: 4 TABLET ORAL at 08:07

## 2023-06-27 RX ADMIN — SENNOSIDES AND DOCUSATE SODIUM 2 TABLET: 50; 8.6 TABLET ORAL at 04:50

## 2023-06-27 RX ADMIN — OXYCODONE HYDROCHLORIDE 10 MG: 10 TABLET ORAL at 02:12

## 2023-06-27 RX ADMIN — HYDROMORPHONE HYDROCHLORIDE 0.5 MG: 1 INJECTION, SOLUTION INTRAMUSCULAR; INTRAVENOUS; SUBCUTANEOUS at 11:05

## 2023-06-27 RX ADMIN — DULOXETINE HYDROCHLORIDE 30 MG: 30 CAPSULE, DELAYED RELEASE ORAL at 04:50

## 2023-06-27 RX ADMIN — ACETAMINOPHEN 650 MG: 325 TABLET, FILM COATED ORAL at 03:07

## 2023-06-27 RX ADMIN — ATORVASTATIN CALCIUM 40 MG: 40 TABLET, FILM COATED ORAL at 04:50

## 2023-06-27 RX ADMIN — CLOPIDOGREL BISULFATE 75 MG: 75 TABLET ORAL at 04:50

## 2023-06-27 RX ADMIN — APIXABAN 5 MG: 5 TABLET, FILM COATED ORAL at 11:26

## 2023-06-27 RX ADMIN — TRIAMCINOLONE ACETONIDE: 1 CREAM TOPICAL at 17:57

## 2023-06-27 RX ADMIN — OXYCODONE HYDROCHLORIDE 10 MG: 10 TABLET ORAL at 08:08

## 2023-06-27 RX ADMIN — MORPHINE SULFATE 15 MG: 15 TABLET, FILM COATED, EXTENDED RELEASE ORAL at 05:57

## 2023-06-27 RX ADMIN — MORPHINE SULFATE 15 MG: 15 TABLET, FILM COATED, EXTENDED RELEASE ORAL at 17:57

## 2023-06-27 RX ADMIN — HYDROMORPHONE HYDROCHLORIDE 1 MG: 1 INJECTION, SOLUTION INTRAMUSCULAR; INTRAVENOUS; SUBCUTANEOUS at 04:49

## 2023-06-27 RX ADMIN — PREGABALIN 300 MG: 150 CAPSULE ORAL at 04:50

## 2023-06-27 ASSESSMENT — PAIN DESCRIPTION - PAIN TYPE
TYPE: ACUTE PAIN

## 2023-06-27 ASSESSMENT — ENCOUNTER SYMPTOMS
ROS GI COMMENTS: TOLERATING A DIET
SHORTNESS OF BREATH: 0
FEVER: 0

## 2023-06-27 NOTE — OP REPORT
VASCULAR SURGERY SERVICE  Operative Note  ___________________________________    Date: 2023    Patient: Lorene Woodard  : 1970  MRN: [id number]  ___________________________________      Preoperative Diagnosis:  -Bilateral lower extremity ischemia  -Acute thrombosis of bilateral common iliac artery stents    Postoperative Diagnosis:  -Bilateral lower extremity ischemia  -Acute thrombosis of bilateral common iliac artery stents    Procedure:  -Aortoiliac angiogram through existing catheter for follow-up of thrombolytic infusion and completion of thrombolytic infusion therapy (02997)  -Placement of bilateral common iliac artery stents, 9 mm diameter by 10 cm long covered self-expanding Viabahn stents, in a kissing fashion, and then 8 mm post stent angioplasty (81566-97)  -Right lower extremity angiogram via existing sheath insertion in the right common femoral artery (40480)  -Closure of the bilateral common femoral artery access sites with 8 [place]an Angio-Seal closure devices which deployed successfully ()    -------------------------------------------------------------------------------------------------    Surgeon:                                 Marlon Kaplan MD    Assistant:   None    Anesthesia:                             GETA    EBL:                                        minimal    Findings:   Existing stents successfully recanalized with thrombolytic therapy.  New kissing Viabahn stents were placed without difficulty.  Iliac arteries are now widely patent.  Right lower extremity runoff is excellent down to the mid lower leg and then the only vessel patent from this level distally is the posterior tibial artery which perfuses multiple vessels on the foot, however I do not see these vessels coursing out all the way to the toes which is indicative of microvascular disease of the forefoot and toes    Complications:                        none    Disposition:                              Tolerated well, sent to recovery in stable condition    -----------------------------------------------------------------------------------------------------    History:  Lorene Woodard is a 52 y.o. female who developed an ulceration of the right great toe in April of this year and she underwent placement of a right common iliac artery stent to revascularize the right leg and help with wound healing.  About 1 month later the stent had thrombosed and on 6/7/2023 I took her back and placed kissing common iliac artery stents.  The patient presented back to the hospital on 6/24/2023 with recurrent bilateral lower extremity pain and both of the stents have rethrombosed.  Thrombolytic infusion catheters were placed yesterday and patient underwent overnight thrombolytic infusion.  I am bringing her back to the angio suite today to follow-up on the thrombolytic infusion therapy with the plan to line the existing iliac artery stents with covered Viabahn stents to help improve duration of patency.  The patient and I, as well as participating family members, discussed the recommendation for surgery as well as alternatives. We discussed the pertinent preoperative, intraoperative, and postoperative aspects of the procedure including anticipated recovery and how that could be affected by potential complications. We discussed potential risks from the surgery including but not limited to: Bleeding and possible need for transfusion, if applicable. Infection of the incision or any potential implanted materials. Injury to nearby vessels or nerves. Injury to nearby organs. Risk of xray and contrast exposure. Risks of anesthesia. We also discussed global risks including but not limited to: Blood clots, Stroke, Heart attack, Pulmonary complications, and not surviving the operation or the recovery. All questions were answered. They understand and agree to proceed. Informed consent was obtained.       Procedure Summary:  The patient was  properly identified in the preoperative area, taken to the operating room, and placed in a supine position and GETA was administered.  The patient was prepped and draped in the usual sterile fashion.  Surgical timeout was called to identify the correct patient, procedure, and equipment.  Everyone was in agreement.    Using the existing thrombolytic infusion catheters I passed Martinez wires up both sides.  I then advanced an Omni Flush catheter up to the pararenal aorta and performed an aortoiliac angiogram.  This confirmed that both of the existing iliac stents had recanalized with thrombolytic therapy.  The tPA had been shut off about 1 hour prior and we gave an additional heparin bolus of 5000 units to help maintain systemic anticoagulation during the case.    At this point I set about relining the existing stents.  I first exchanged the existing 6 Moroccan sheaths for 8 Moroccan sheaths in both groins. I passed 9 mm x 10 cm Viabahn stents of both sides with no resistance.  The stents were deployed simultaneously and then 8 mm balloon angioplasty was used to complete the deployment of the stents.  Follow-up aortoiliac angiogram showed no extravasation and excellent patency of both stents.  Due to known runoff disease of the right foot and the presence of the ulcerations on the toes, I did perform a right lower extremity angiogram.  This showed that the femoral and popliteal vessels are widely patent and the proximal aspect of all 3 tibial arteries is actually patent as well.  However at about the midportion of the lower leg the anterior tibial and peroneal artery are occluded without distal reconstitution.  The posterior tibial artery is inline patent down to the ankle and it perfuses multiple vessels on the foot, however I did not see these vessels coursing out to the forefoot and toes, which is consistent with microvascular disease and the patient may still have difficulty healing her wounds.    At this point the  sheaths were removed and the sites were closed with 8 Tamazight Angio-Seal closure devices which deployed successfully.  Manual pressure was held for 5 minutes to assure hemostasis and then the puncture sites were protected with skin glue.  Patient tolerated the well and was sent to recovery in stable condition.    Postoperative plan:  Patient was on aspirin and Plavix prior to this procedure.  She still sustained 2 different stent thrombosis events.  My plan therefore is to add systemic anticoagulation.  We will initially do this with heparin and monitor her clinical progress after the procedure and then we will switch to oral anticoagulation, likely with Eliquis, and plan to continue this indefinitely.    Marlon Kaplan MD  [facility]  [phone]

## 2023-06-27 NOTE — PROGRESS NOTES
Patient is going to be transferred out to S523/02; report given to Malou Nick RN for continuity of care.

## 2023-06-27 NOTE — PROGRESS NOTES
[facility] Medicine Daily Progress Note    Date of Service  6/26/2023    Chief Complaint  Lorene Woodard is a 52 y.o. female admitted 6/23/2023 with intractable bilateral leg pain.    Hospital Course  Lorene Woodard is a 52 y.o. female past medical history of CAD, hypertension, hyperlipidemia, peripheral vascular disease status post placement of right common iliac artery stent on dual antiplatelet therapy aspirin/Plavix, cellulitis treated with antibiotics, psoriasis who presented 6/23/2023 with as a transfer from [facility] due to concerns of cauda equina with urinary incontinence.  Patient had CTA of the lower extremities with runoff which showed patent stents she was given vancomycin and Rocephin prior to arrival to cover for possible infection.  She denies any fevers, chills, chest pain or shortness of breath.  Denies any trauma to the area.  In the ER, she has subjective weakness to bilateral lower extremities with numbness.  On my exam power is 5 out of 5 bilaterally.  She does not have any saddle anesthesia.  She will be admitted to the neuro floor and an MRI of the lumbar spine with and without contrast has been ordered stat in addition to repeat labs CBC, metabolic panel, ESR and CRP.  MRI was reviewed and did not show evidence of acute changes to necessitate intervention    Interval Problem Update  06/24/23  Patient was significant pain this morning radiating from the low back behind both legs down to the calves.  No exacerbating or alleviating factors.  Started on Medrol Dosepak as well as duloxetine.  She is already on pregabalin extremely high dose of 300 mg twice a day.  I explained to the patient that her pain is multifactorial including physical, stress, and psychosocial factors.  Even though she is reporting very severe pain she appears to be very comfortable throughout the day conversing with her family member.  Patient states that she has had this pain for very long time.    Patient was seen  by limb preservation services who was concerned about osteomyelitis of the first and second toes.  Per my examination, patient has dry gangrene changes.  She reports severe pain in her first and second toes.  I will order an MRI with and without contrast of the right foot.  [facility] has consulted Dr. Vivek Figueroa of vascular surgery.  06/25/23  Patient reporting she continues to have significant pain in her bilateral lower extremities with no significant improvement following administration of Medrol Dosepak.  Overall overall patient appears comfortable.  MRI shows osteomyelitis of the first and second toes.  Patient is white count is stable.  CT aortogram which per my read shows thrombosed by iliac stent. discussed the case with Dr. Figueroa following patient's surgery.  He decided to place intra-arterial catheter for lysis therapy with alteplase.  He will be taking the patient back to the operating room tomorrow for a lysis check.  Patient will need to be admitted to the IMCU versus ICU for the lytic therapy.  I have reached out to the intensivist to assist with transfer from the PACU.  6/26 the patient remains on thrombolytics, heparin drip, she currently is adequately pain controlled, she is awaiting further vascular surgery evaluation and was later seen by Dr. Marlon Kaplan,  Patient afebrile, heart rate in the 50s to 60s, respiration unlabored, 2 L nasal cannula oxygen saturating in the mid 90s, blood pressure in the 160s to 140s over 70s,  Laboratory data reviewed, white cell count 7.1, hemoglobin 11.1, hematocrit 34.4, platelet count 214, chemistry reviewed, fairly unchanged,  Patient is later taken to the operating room for aortogram, realigning of iliac stents with covered stents and possible femorofemoral bypass as per vascular surgery  Outcome is currently pending  I have discussed this patient's plan of care and discharge plan at IDT rounds today with Case Management, Nursing, Nursing leadership, and other  members of the IDT team.    Consultants/Specialty  vascular surgery    Code Status  Full Code    Disposition  The patient is not medically cleared for discharge to home or a post-acute facility.  Anticipate discharge to: home with close outpatient follow-up    I have placed the appropriate orders for post-discharge needs.    Review of Systems  Review of Systems   Constitutional: Negative.  Negative for chills and fever.   HENT: Negative.     Eyes: Negative.    Respiratory: Negative.  Negative for cough and shortness of breath.    Cardiovascular: Negative.  Negative for chest pain and palpitations.   Gastrointestinal: Negative.  Negative for abdominal pain, heartburn, nausea and vomiting.   Genitourinary: Negative.  Negative for dysuria, frequency and hematuria.   Musculoskeletal:  Positive for back pain, joint pain, myalgias and neck pain.   Skin: Negative.  Negative for itching and rash.   Neurological:  Positive for headaches. Negative for dizziness, focal weakness and weakness.   Endo/Heme/Allergies: Negative.  Negative for polydipsia. Does not bruise/bleed easily.   Psychiatric/Behavioral:  Positive for depression. The patient is nervous/anxious.         Physical Exam  Temp:  [36.2 °C (97.2 °F)-36.6 °C (97.9 °F)] 36.4 °C (97.6 °F)  Pulse:  [55-79] 65  Resp:  [7-20] 12  BP: (135-178)/(69-86) 166/80  SpO2:  [90 %-97 %] 95 %    Physical Exam  Vitals and nursing note reviewed.   Constitutional:       General: She is not in acute distress.     Appearance: Normal appearance. She is obese. She is not ill-appearing or diaphoretic.      Comments: No apparent distress   HENT:      Head: Normocephalic and atraumatic.      Mouth/Throat:      Mouth: Mucous membranes are moist.      Pharynx: Oropharynx is clear. No oropharyngeal exudate.   Eyes:      General:         Right eye: No discharge.         Left eye: No discharge.      Conjunctiva/sclera: Conjunctivae normal.      Pupils: Pupils are equal, round, and reactive to  light.   Cardiovascular:      Rate and Rhythm: Normal rate and regular rhythm.      Pulses: Normal pulses.      Heart sounds: Normal heart sounds. No murmur heard.     Comments: Lower extremity pulses decreased, left DP is palpable  Pulmonary:      Effort: Pulmonary effort is normal. No respiratory distress.      Breath sounds: Normal breath sounds.   Abdominal:      General: Abdomen is flat. Bowel sounds are normal. There is no distension.      Palpations: Abdomen is soft.      Tenderness: There is no abdominal tenderness.   Musculoskeletal:         General: Tenderness present.      Cervical back: Neck supple. No tenderness.      Right lower leg: No edema.      Left lower leg: No edema.      Comments: Right toe gangrene  Bilateral lower extremity capillary refill decreased, colder to touch   Skin:     Coloration: Skin is pale.      Findings: Rash (Scaling with erythema in the bilateral extremities and also in the hands improving) present.   Neurological:      Mental Status: She is alert and oriented to person, place, and time.      Motor: No weakness.      Comments: Patient has full strength in the upper and lower extremities.  Sensation is intact to light touch in both the upper and lower extremities.   Psychiatric:         Attention and Perception: Attention normal.         Mood and Affect: Mood is anxious. Affect is labile.         Speech: Speech normal.         Behavior: Behavior is cooperative.         Thought Content: Thought content normal.         Cognition and Memory: Cognition normal.         Judgment: Judgment normal.         Fluids    Intake/Output Summary (Last 24 hours) at 6/26/2023 1813  Last data filed at 6/26/2023 1624  Gross per 24 hour   Intake 378.82 ml   Output 1265 ml   Net -886.18 ml         Laboratory  Recent Labs     06/23/23  1942 06/25/23  1729 06/25/23  2345 06/26/23  0600 06/26/23  1533   WBC 10.2 10.4 9.8 8.7 7.1   RBC 4.02* 4.07* 4.00* 4.05* 4.04*   HEMOGLOBIN 11.0* 11.2* 11.2* 11.1*  11.1*   HEMATOCRIT 34.4* 34.6* 33.7* 34.2* 34.4*   MCV 85.6 85.0 84.3 84.4 85.1   MCH 27.4 27.5 28.0 27.4 27.5   MCHC 32.0* 32.4 33.2 32.5 32.3   RDW 44.7 42.5 42.3 41.6 42.4   PLATELETCT 227 214  --   --   --    MPV 9.5 9.5  --   --   --        Recent Labs     06/25/23  1729 06/26/23  0811   SODIUM 135 139   POTASSIUM 4.6 3.9   CHLORIDE 100 102   CO2 24 25   GLUCOSE 130* 94   BUN 16 16   CREATININE 0.65 0.58   CALCIUM 9.1 8.8     Recent Labs     06/25/23  1729 06/25/23  2345 06/26/23  0600 06/26/23  1533   APTT 29.3 33.2 28.4 25.3   INR 1.10 1.27* 1.19*  --                  Imaging  US-EXTREMITY ARTERY LOWER BILAT         CT-CTA AORTA-RO WITH & W/O-POST PROCESS   Final Result      1.  Infrarenal abdominal aortic occlusion with bilateral iliac stent occlusion. There is considerable soft and calcified plaque but no aneurysm or dissection      2.  Bilateral reconstitution of external iliac arteries from internal iliac filling. Bilateral posterior tibialis contrast opacification at the level of the ankles. Dorsalis pedis lack of contrast which is more likely from the phase of contrast    enhancement than bilateral occlusion. No focal lower extremity stenosis is seen      3.  Nonspecific right lower lobe mildly enlarged peribronchial lymph node. Consider follow-up CT in 3-6 months to confirm resolution.      3D angiographic/MIP images of the vasculature confirm the vascular findings as described above.      MR-FOOT-WITH & W/O RIGHT   Final Result      1.  Second terminal tuft osteomyelitis without evidence of septic arthropathy or abscess      2.  Dysmorphic first distal phalanx terminal tuft contours with geographic margination suggesting chronic abnormality as can be seen with chronic/prior osteomyelitis. There is no adjacent edema to indicate acute osteomyelitis.      3.  Second metatarsal head Freiberg's infraction/avascular necrosis and there is no unstable fragment or evidence of osteoarthritis      US-BENI SINGLE  LEVEL BILAT   Final Result      DX-FOOT-COMPLETE 3+ RIGHT   Final Result      First and second distal toe skin ulceration which could obscure underlying early osteomyelitis. If there is high clinical concern, MRI could be obtained to further evaluate      MR-LUMBAR SPINE-WITH & W/O   Final Result      1.  Multilevel multifactorial degenerative changes   2.  No areas of abnormal enhancement   3.  No areas of high-grade central canal narrowing   4.  Areas of central canal and neural foraminal narrowing as described above      DX-FOOT-2- RIGHT   Final Result         1.  No acute traumatic bony injury. No destructive osseous process is identified, note however that plain film can be insensitive for evaluation of osteomyelitis and bone scan would offer improved diagnostic sensitivity as clinically appropriate.   2.  Plantar and Achilles calcaneal bone spurs.      IR-ABDOMINAL AORTA-WITH RUNOFF    (Results Pending)   IR-ABDOMINAL AORTA-WITH RUNOFF    (Results Pending)          Assessment/Plan  * Complaints of weakness of lower extremity- (present on admission)  Assessment & Plan  Subjective weakness of bilateral LE  Power 4-5/5 in bilateral lower extremities  Received Vancomycin/Rocephin PTA. Hold further abx  Neuro checks every 4 hours   MRI per my read shows no significant stenosis or disc herniation.   CT evaluation reveals iliac stents noted to be thrombosed.    Evaluated by vascular surgery to intervene      Thrombosed aortic conduit, initial encounter ([facility])- (present on admission)  Assessment & Plan  CT aortogram shows thrombosis  iliac stent.  Patient states that she has been taking aspirin.  Patient was taken to the operating room on 6/25/2023 by Dr. Vivek Figueroa for thrombectomy.  Intra-arterial catheter placed with lytic therapy with alteplase.  Ongoing vascular evaluation and intervention currently pending, patient brought back to the operating room for revascularization    Subacute osteomyelitis of right foot  (HCC)- (present on admission)  Assessment & Plan  MRI of the right foot shows second terminal tuft osteomyelitis without evidence of septic arthropathy or abscess.  Dysmorphic first distal phalanx terminal tuft suggestive of chronic abnormality likely chronic osteomyelitis.  LPS following  Orthopedic surgery versus vascular surgery in terms of amputation  Hold off antibiotics for now since normal white count.  Vascular surgery evaluating terms of revascularization.    Chronic narcotic dependence (HCC)- (present on admission)  Assessment & Plan  As per history.    Continue home MS Contin 15 mg twice a day.    Intractable back pain- (present on admission)  Assessment & Plan  Acute on chronic.  No signs of cauda equina clinically or or radiographically.  Trial of Medrol Dosepak  Continue home MS Contin 15 mg every 12 hours  Oxycodone as needed for breakthrough  I have ordered IV Dilaudid as needed for breakthrough pain.    History of procedure for peripheral vascular disease- (present on admission)  Assessment & Plan  History of stent placement of right common iliac artery and was placed on dual antiplatelet therapy with aspirin and Plavix  CT aortogram showing thrombosed, iliac stents.  Taken to the operating room for thrombectomy on 6/25/2023.  Continue aspirin and Plavix.  Lytic therapy as per vascular surgery  As per vascular surgery taken back to the operating room for further intervention revision    Dry gangrene (HCC)- (present on admission)  Assessment & Plan    X-ray per my read shows no bony fractures.  No obvious erosions of the bone consistent with osteomyelitis.    Please see subacute osteomyelitis section    Depression- (present on admission)  Assessment & Plan  As per history.    Restarted duloxetine for pain  Atarax as needed for anxiety      Psoriasis- (present on admission)  Assessment & Plan  Improving with triamcinolone cream.  Continue triamcinolone cream.    Hyperlipidemia- (present on  admission)  Assessment & Plan  Continue with statin    Hypertension- (present on admission)  Assessment & Plan  Continue with home metoprolol, Lasix, amlodipine    Plan  Await vascular surgery outcome  Continue medication regimen including  Multimodality pain control  Steroids for acute back pain with possible of tissue inflammation  Anticoagulation determined by surgical outcome  See orders  Ongoing wound care needs  Patient is has a high medical complexity, complex decision making and is at high risk for complication, morbidity, and mortality.    VTE prophylaxis: enoxaparin ppx    I have performed a physical exam and reviewed and updated ROS and Plan today (6/26/2023). In review of yesterday's note (6/25/2023), there are no changes except as documented above.    Please note that this dictation was created using voice recognition software. I have made every reasonable attempt to correct obvious errors, but I expect that there are errors of grammar and possibly context that I did not discover before finalizing the note.

## 2023-06-27 NOTE — CARE PLAN
The patient is Watcher - Medium risk of patient condition declining or worsening    Shift Goals  Clinical Goals: monitor for bleeding, monitor for circulation BLE; pain mgt  Patient Goals: eat  Family Goals: WILDER    Progress made toward(s) clinical / shift goals:      Problem: Knowledge Deficit - Standard  Goal: Patient and family/care givers will demonstrate understanding of plan of care, disease process/condition, diagnostic tests and medications  Outcome: Progressing     Problem: Skin Integrity  Goal: Skin integrity is maintained or improved  Outcome: Progressing       Patient is not progressing towards the following goals:      Problem: Pain - Standard  Goal: Alleviation of pain or a reduction in pain to the patient’s comfort goal  Outcome: Not Progressing

## 2023-06-27 NOTE — CARE PLAN
The patient is Watcher - Medium risk of patient condition declining or worsening    Shift Goals  Clinical Goals: Monitor vascular status  Patient Goals: No Pain  Family Goals: Comfort    Progress made toward(s) clinical / shift goals:    Problem: Pain - Standard  Goal: Alleviation of pain or a reduction in pain to the patient’s comfort goal  Outcome: Progressing  Flowsheets  Taken 6/27/2023 0212 by Tia Leigh RDaltonNDalton  Pain Rating Scale (NPRS): 5  Taken 6/26/2023 1859 by Danna Jones R.NDalton  Non Verbal Scale: Calm     Problem: Knowledge Deficit - Standard  Goal: Patient and family/care givers will demonstrate understanding of plan of care, disease process/condition, diagnostic tests and medications  Outcome: Progressing     Problem: Skin Integrity  Goal: Skin integrity is maintained or improved  Outcome: Progressing     Problem: Fall Risk  Goal: Patient will remain free from falls  Outcome: Progressing

## 2023-06-27 NOTE — PROGRESS NOTES
4 Eyes Skin Assessment Completed by ORLANDO Weber and ORLANDO Magdaleno.     Head WDL  Ears Redness and Blanching  Nose WDL  Mouth WDL  Neck WDL  Breast/Chest WDL  Shoulder Blades WDL  Spine WDL  (R) Arm/Elbow/Hand Psoriasis scales on hand  (L) Arm/Elbow/Hand  Psoriasis scales on hand  Abdomen WDL  Groin Abrasion, Bilateral sheath sites closed with Dermabond  Scrotum/Coccyx/Buttocks WDL  (R) Leg Flaky,  Psoriasis scales  (L) Leg Flaky,  Psoriasis scales   (R) Heel/Foot/Toe Redness, Discoloration, and Swelling 1st & 2nd toes black  (L) Heel/Foot/Toe Discoloration and Swelling              Devices In Places Tele Box, Blood Pressure Cuff, Pulse Ox, Li, and Nasal Cannula        Interventions In Place NC W/Ear Foams and Pillows     Possible Skin Injury Yes     Pictures Uploaded Into Epic Yes  Wound Consult Placed Yes  RN Wound Prevention Protocol Ordered Yes

## 2023-06-27 NOTE — ANESTHESIA PROCEDURE NOTES
Airway    Date/Time: 6/26/2023 5:17 PM    Performed by: Jimmie Harrison M.D.  Authorized by: Jimmie Harrison M.D.    Location:  OR  Urgency:  Elective  Indications for Airway Management:  Anesthesia      Spontaneous Ventilation: absent    Sedation Level:  Deep  Preoxygenated: Yes    Patient Position:  Sniffing  Final Airway Type:  Endotracheal airway  Final Endotracheal Airway:  ETT  Cuffed: Yes    Technique Used for Successful ETT Placement:  Direct laryngoscopy    Insertion Site:  Oral  Blade Type:  Ozzy  Laryngoscope Blade/Videolaryngoscope Blade Size:  3  ETT Size (mm):  7.5  Measured from:  Gums  ETT to Gums (cm):  20  Placement Verified by: auscultation and capnometry    Cormack-Lehane Classification:  Grade I - full view of glottis  Number of Attempts at Approach:  1

## 2023-06-27 NOTE — ANESTHESIA POSTPROCEDURE EVALUATION
Patient: Lorene Woodard    Procedure Summary     Date: 06/26/23 Room / Location: [place] [id number] / SURGERY [facility]    Anesthesia Start: 1703 Anesthesia Stop: 1849    Procedures:       AORTOGRAM, WITH RUNOFF (Bilateral: Groin)      COMMON ILIAC ARTERY WITH STENT INSERTION (Bilateral: Groin) Diagnosis: (lower extremity ischemia- bilateral)    Surgeons: Marlon Kaplan M.D. Responsible Provider: Jimmie Harrison M.D.    Anesthesia Type: general ASA Status: 3          Final Anesthesia Type: general  Last vitals  BP   Blood Pressure: 104/69, NIBP: (!) 174/76, Arterial BP: 149/73    Temp   (!) 35.6 °C (96 °F)    Pulse   61   Resp   12    SpO2   98 %      Anesthesia Post Evaluation    Patient location during evaluation: PACU  Patient participation: complete - patient participated  Level of consciousness: sleepy but conscious  Pain score: 0    Airway patency: patent  Anesthetic complications: no  Cardiovascular status: hemodynamically stable  Respiratory status: acceptable  Hydration status: balanced    PONV: none          There were no known notable events for this encounter.     Nurse Pain Score: 0 (NPRS)

## 2023-06-27 NOTE — PROGRESS NOTES
12 Hour Chart Check  Monitor Summary  Sinus Alexander/ Sinus Rhythm  HR  50's to 70's  0.149/0.078/0.414

## 2023-06-27 NOTE — PROGRESS NOTES
Hospital Medicine Daily Progress Note    Date of Service  6/27/2023    Chief Complaint  Lorene Woodard is a 52 y.o. female admitted 6/23/2023 with lower extremity weakness and pain.    Hospital Course  Ms. Woodard is a 52 y.o. female past medical history of CAD, hypertension, hyperlipidemia, peripheral vascular disease status post placement of right common iliac artery stent on dual antiplatelet therapy aspirin/Plavix, cellulitis treated with antibiotics, psoriasis who presented 6/23/2023 with as a transfer from [facility] due to concerns of cauda equina with urinary incontinence.  Patient had CTA of the lower extremities with runoff which showed patent stents she was given vancomycin and Rocephin prior to arrival to cover for possible infection.  She denies any fevers, chills, chest pain or shortness of breath.  Denies any trauma to the area.  In the ER, she has subjective weakness to bilateral lower extremities with numbness.  On my exam power is 5 out of 5 bilaterally.  She does not have any saddle anesthesia.  She will be admitted to the neuro floor and an MRI of the lumbar spine with and without contrast has been ordered stat in addition to repeat labs CBC, metabolic panel, ESR and CRP.  MRI was reviewed and did not show evidence of acute changes to necessitate intervention.  A CTA of the aorta with runoff revealed abdominal aortic occlusion with bilateral iliac stent occlusion. On 6/24 she underwent catheter directed thrombolysis of bilateral common iliac stents by Dr. Figueroa with [facility] admission status post lytic therapy. On 6/26 she had bilateral common iliac stents placed.     Interval Problem Update  6/27: Ms. Woodard was evaluated and examined in the IMCU. She is on an IV heparin drip with titration of Anti Xa levels.  She states her pain in the lower pelvic region and both legs is about 7/10 in severity. She has been receiving oxycodone and IV dilaudid for breakthrough pain.     I have discussed this  patient's plan of care and discharge plan at IDT rounds today with Case Management, Nursing, Nursing leadership, and other members of the IDT team.    Consultants/Specialty  Vascular surgery. I discussed with Dr. Kaplan    Code Status  Full Code    Disposition  The patient is not medically cleared for discharge to home or a post-acute facility.  Anticipate discharge to: home with close outpatient follow-up    I have placed the appropriate orders for post-discharge needs.    Review of Systems  Review of Systems   Constitutional:  Negative for fever.   Respiratory:  Negative for shortness of breath.    Cardiovascular:  Negative for chest pain.   Gastrointestinal:         Tolerating a diet   All other systems reviewed and are negative.       Physical Exam  Temp:  [35.6 °C (96 °F)-36.8 °C (98.3 °F)] 36.8 °C (98.2 °F)  Pulse:  [57-84] 69  Resp:  [7-20] 16  BP: (104-171)/(62-91) 132/64  SpO2:  [90 %-99 %] 92 %    Physical Exam  Vitals and nursing note reviewed.   Constitutional:       General: She is not in acute distress.     Appearance: She is not toxic-appearing.   Cardiovascular:      Rate and Rhythm: Normal rate and regular rhythm.   Pulmonary:      Comments: 1 liter of NC oxygen  Abdominal:      General: There is no distension.      Tenderness: There is no abdominal tenderness.   Musculoskeletal:      Right lower leg: Edema present.      Left lower leg: Edema present.      Comments: Right groin site without hematoma  Tip of right great toe and 2nd toe dry-gangrene without infection   Skin:     Comments: Shins are red, dry, flaking skin with scabs on her shins very well demarcated.    Dry, flaking skin palms    Neurological:      General: No focal deficit present.      Mental Status: She is alert and oriented to person, place, and time.   Psychiatric:         Mood and Affect: Mood normal.         Behavior: Behavior normal.         Fluids    Intake/Output Summary (Last 24 hours) at 6/27/2023 0735  Last data filed at  6/27/2023 0600  Gross per 24 hour   Intake 1584.12 ml   Output 1215 ml   Net 369.12 ml       Laboratory  Recent Labs     06/25/23  1729 06/25/23  2345 06/26/23  0600 06/26/23  1533 06/27/23  0310   WBC 10.4   < > 8.7 7.1 7.6   RBC 4.07*   < > 4.05* 4.04* 4.03*   HEMOGLOBIN 11.2*   < > 11.1* 11.1* 11.2*   HEMATOCRIT 34.6*   < > 34.2* 34.4* 33.8*   MCV 85.0   < > 84.4 85.1 83.9   MCH 27.5   < > 27.4 27.5 27.8   MCHC 32.4   < > 32.5 32.3 33.1   RDW 42.5   < > 41.6 42.4 42.1   PLATELETCT 214  --   --   --  230   MPV 9.5  --   --   --  9.5    < > = values in this interval not displayed.     Recent Labs     06/25/23  1729 06/26/23  0811 06/27/23  0310   SODIUM 135 139 136   POTASSIUM 4.6 3.9 4.3   CHLORIDE 100 102 98   CO2 24 25 26   GLUCOSE 130* 94 125*   BUN 16 16 18   CREATININE 0.65 0.58 0.87   CALCIUM 9.1 8.8 8.9     Recent Labs     06/25/23  2345 06/26/23  0600 06/26/23  1533 06/26/23 2020   APTT 33.2 28.4 25.3 31.7   INR 1.27* 1.19*  --  1.17*               Imaging  US-EXTREMITY ARTERY LOWER BILAT         CT-CTA AORTA-RO WITH & W/O-POST PROCESS   Final Result      1.  Infrarenal abdominal aortic occlusion with bilateral iliac stent occlusion. There is considerable soft and calcified plaque but no aneurysm or dissection      2.  Bilateral reconstitution of external iliac arteries from internal iliac filling. Bilateral posterior tibialis contrast opacification at the level of the ankles. Dorsalis pedis lack of contrast which is more likely from the phase of contrast    enhancement than bilateral occlusion. No focal lower extremity stenosis is seen      3.  Nonspecific right lower lobe mildly enlarged peribronchial lymph node. Consider follow-up CT in 3-6 months to confirm resolution.      3D angiographic/MIP images of the vasculature confirm the vascular findings as described above.      MR-FOOT-WITH & W/O RIGHT   Final Result      1.  Second terminal tuft osteomyelitis without evidence of septic arthropathy or  abscess      2.  Dysmorphic first distal phalanx terminal tuft contours with geographic margination suggesting chronic abnormality as can be seen with chronic/prior osteomyelitis. There is no adjacent edema to indicate acute osteomyelitis.      3.  Second metatarsal head Freiberg's infraction/avascular necrosis and there is no unstable fragment or evidence of osteoarthritis      US-BENI SINGLE LEVEL BILAT   Final Result      DX-FOOT-COMPLETE 3+ RIGHT   Final Result      First and second distal toe skin ulceration which could obscure underlying early osteomyelitis. If there is high clinical concern, MRI could be obtained to further evaluate      MR-LUMBAR SPINE-WITH & W/O   Final Result      1.  Multilevel multifactorial degenerative changes   2.  No areas of abnormal enhancement   3.  No areas of high-grade central canal narrowing   4.  Areas of central canal and neural foraminal narrowing as described above      DX-FOOT-2- RIGHT   Final Result         1.  No acute traumatic bony injury. No destructive osseous process is identified, note however that plain film can be insensitive for evaluation of osteomyelitis and bone scan would offer improved diagnostic sensitivity as clinically appropriate.   2.  Plantar and Achilles calcaneal bone spurs.      IR-ABDOMINAL AORTA-WITH RUNOFF    (Results Pending)   IR-ABDOMINAL AORTA-WITH RUNOFF    (Results Pending)        Assessment/Plan  * Thrombosed aortic conduit, initial encounter (HCC)- (present on admission)  Assessment & Plan  CT aortogram shows thrombosis  iliac stent.  Patient states that she has been taking aspirin.  Patient was taken to the operating room on 6/25/2023 by Dr. Vivek Figueroa for thrombectomy.  Intra-arterial catheter placed with lytic therapy with alteplase.  Ongoing vascular evaluation and intervention currently pending, patient brought back to the operating room for revascularization for bilat stents on 6/26  Initiate triple therapy with dual antiplatelets  and Eliquis and monitor for bleeding.    Subacute osteomyelitis of right foot (HCC)- (present on admission)  Assessment & Plan  MRI of the right foot shows second terminal tuft osteomyelitis without evidence of septic arthropathy or abscess.  Dysmorphic first distal phalanx terminal tuft suggestive of chronic abnormality likely chronic osteomyelitis.  LPS following  Orthopedic surgery versus vascular surgery in terms of amputation  Status post revascularization.    Chronic narcotic dependence (HCC)- (present on admission)  Assessment & Plan  As per history.    Continue home MS Contin 15 mg twice a day.    Intractable back pain- (present on admission)  Assessment & Plan  Acute on chronic.  No signs of cauda equina clinically or or radiographically.  Continue home MS Contin 15 mg every 12 hours  Oxycodone as needed for breakthrough  S/p steroids  Now improving    Dry gangrene (HCC)- (present on admission)  Assessment & Plan    X-ray per my read shows no bony fractures.  No obvious erosions of the bone consistent with osteomyelitis.    Please see subacute osteomyelitis section    Psoriasis- (present on admission)  Assessment & Plan  Improving with triamcinolone cream.  Continue triamcinolone cream.    Hyperlipidemia- (present on admission)  Assessment & Plan  Continue with statin    Hypertension- (present on admission)  Assessment & Plan  Continue with home metoprolol, Lasix, amlodipine with holding parameters    Complaints of weakness of lower extremity- (present on admission)  Assessment & Plan  Subjective weakness of bilateral LE now improving   MRI does not reveal significant stenosis nor disc herniation.         Depression- (present on admission)  Assessment & Plan  As per history.    Restarted duloxetine for pain  Atarax as needed for anxiety           VTE prophylaxis: Eliquis    I have performed a physical exam and reviewed and updated ROS and Plan today (6/27/2023). In review of yesterday's note (6/26/2023),  there are no changes except as documented above.

## 2023-06-27 NOTE — ANESTHESIA TIME REPORT
Anesthesia Start and Stop Event Times     Date Time Event    6/26/2023 1645 Ready for Procedure     1703 Anesthesia Start     1849 Anesthesia Stop        Responsible Staff  06/26/23    Name Role Begin End    Jimmie Harrison M.D. Anesth 1703 1849        Overtime Reason:  no overtime (within assigned shift)    Comments:

## 2023-06-27 NOTE — ANESTHESIA PROCEDURE NOTES
Arterial Line    Performed by: Jimmie Harrison M.D.  Authorized by: Jimmie Harrison M.D.    Start Time:  6/26/2023 5:20 PM  Localization: ultrasound guidance  Image captured, interpreted and electronically stored.  Patient Location:  OR  Indication: continuous blood pressure monitoring        Catheter Size:  20 G  Seldinger Technique?: Yes    Laterality:  Right  Site:  Radial artery  Line Secured:  Antimicrobial disc, tape and transparent dressing  Events: patient tolerated procedure well with no complications     Placed with one attempt

## 2023-06-27 NOTE — OR NURSING
1844 Pt arrived from OR in a bed. ID band verified. Report received from anesthesia and OR nurse. No belongings with pt. Bilateral groin sites are clean, dry and intact. Right pulse is present with doppler. Left pulse is 2+. Pt is on tele monitor. Per MD Kaplan pt be HOB <20 degress for 3 hours.     1940 Report given to bedside RN. Right a line d/c'd.

## 2023-06-28 ENCOUNTER — PHARMACY VISIT (OUTPATIENT)
Dept: PHARMACY | Facility: MEDICAL CENTER | Age: 53
End: 2023-06-28
Payer: COMMERCIAL

## 2023-06-28 VITALS
DIASTOLIC BLOOD PRESSURE: 70 MMHG | SYSTOLIC BLOOD PRESSURE: 128 MMHG | HEIGHT: 67 IN | WEIGHT: 213.19 LBS | TEMPERATURE: 96.5 F | HEART RATE: 57 BPM | OXYGEN SATURATION: 96 % | BODY MASS INDEX: 33.46 KG/M2 | RESPIRATION RATE: 18 BRPM

## 2023-06-28 LAB
ANION GAP SERPL CALC-SCNC: 11 MMOL/L (ref 7–16)
BASOPHILS # BLD AUTO: 0.2 % (ref 0–1.8)
BASOPHILS # BLD: 0.01 K/UL (ref 0–0.12)
BUN SERPL-MCNC: 16 MG/DL (ref 8–22)
CALCIUM SERPL-MCNC: 9 MG/DL (ref 8.5–10.5)
CHLORIDE SERPL-SCNC: 103 MMOL/L (ref 96–112)
CO2 SERPL-SCNC: 26 MMOL/L (ref 20–33)
CREAT SERPL-MCNC: 0.65 MG/DL (ref 0.5–1.4)
EOSINOPHIL # BLD AUTO: 0.01 K/UL (ref 0–0.51)
EOSINOPHIL NFR BLD: 0.2 % (ref 0–6.9)
ERYTHROCYTE [DISTWIDTH] IN BLOOD BY AUTOMATED COUNT: 42.7 FL (ref 35.9–50)
GFR SERPLBLD CREATININE-BSD FMLA CKD-EPI: 106 ML/MIN/1.73 M 2
GLUCOSE SERPL-MCNC: 85 MG/DL (ref 65–99)
HCT VFR BLD AUTO: 31.7 % (ref 37–47)
HGB BLD-MCNC: 10.6 G/DL (ref 12–16)
IMM GRANULOCYTES # BLD AUTO: 0.06 K/UL (ref 0–0.11)
IMM GRANULOCYTES NFR BLD AUTO: 1 % (ref 0–0.9)
LYMPHOCYTES # BLD AUTO: 2.08 K/UL (ref 1–4.8)
LYMPHOCYTES NFR BLD: 33.7 % (ref 22–41)
MCH RBC QN AUTO: 28.1 PG (ref 27–33)
MCHC RBC AUTO-ENTMCNC: 33.4 G/DL (ref 32.2–35.5)
MCV RBC AUTO: 84.1 FL (ref 81.4–97.8)
MONOCYTES # BLD AUTO: 0.63 K/UL (ref 0–0.85)
MONOCYTES NFR BLD AUTO: 10.2 % (ref 0–13.4)
NEUTROPHILS # BLD AUTO: 3.39 K/UL (ref 1.82–7.42)
NEUTROPHILS NFR BLD: 54.7 % (ref 44–72)
NRBC # BLD AUTO: 0 K/UL
NRBC BLD-RTO: 0 /100 WBC (ref 0–0.2)
PLATELET # BLD AUTO: 224 K/UL (ref 164–446)
PMV BLD AUTO: 9.5 FL (ref 9–12.9)
POTASSIUM SERPL-SCNC: 3.8 MMOL/L (ref 3.6–5.5)
RBC # BLD AUTO: 3.77 M/UL (ref 4.2–5.4)
SODIUM SERPL-SCNC: 140 MMOL/L (ref 135–145)
WBC # BLD AUTO: 6.2 K/UL (ref 4.8–10.8)

## 2023-06-28 PROCEDURE — A9270 NON-COVERED ITEM OR SERVICE: HCPCS | Performed by: STUDENT IN AN ORGANIZED HEALTH CARE EDUCATION/TRAINING PROGRAM

## 2023-06-28 PROCEDURE — 700102 HCHG RX REV CODE 250 W/ 637 OVERRIDE(OP): Performed by: STUDENT IN AN ORGANIZED HEALTH CARE EDUCATION/TRAINING PROGRAM

## 2023-06-28 PROCEDURE — RXMED WILLOW AMBULATORY MEDICATION CHARGE: Performed by: HOSPITALIST

## 2023-06-28 PROCEDURE — 36415 COLL VENOUS BLD VENIPUNCTURE: CPT

## 2023-06-28 PROCEDURE — A9270 NON-COVERED ITEM OR SERVICE: HCPCS | Performed by: HOSPITALIST

## 2023-06-28 PROCEDURE — 99239 HOSP IP/OBS DSCHRG MGMT >30: CPT | Performed by: HOSPITALIST

## 2023-06-28 PROCEDURE — 700102 HCHG RX REV CODE 250 W/ 637 OVERRIDE(OP): Performed by: HOSPITALIST

## 2023-06-28 PROCEDURE — 85025 COMPLETE CBC W/AUTO DIFF WBC: CPT

## 2023-06-28 PROCEDURE — 80048 BASIC METABOLIC PNL TOTAL CA: CPT

## 2023-06-28 RX ADMIN — POLYETHYLENE GLYCOL 3350 1 PACKET: 17 POWDER, FOR SOLUTION ORAL at 05:12

## 2023-06-28 RX ADMIN — PREGABALIN 300 MG: 150 CAPSULE ORAL at 05:13

## 2023-06-28 RX ADMIN — OXYCODONE HYDROCHLORIDE 10 MG: 10 TABLET ORAL at 03:21

## 2023-06-28 RX ADMIN — FUROSEMIDE 40 MG: 40 TABLET ORAL at 05:13

## 2023-06-28 RX ADMIN — ASPIRIN 81 MG 81 MG: 81 TABLET ORAL at 05:14

## 2023-06-28 RX ADMIN — CLOPIDOGREL BISULFATE 75 MG: 75 TABLET ORAL at 05:13

## 2023-06-28 RX ADMIN — SENNOSIDES AND DOCUSATE SODIUM 2 TABLET: 50; 8.6 TABLET ORAL at 05:12

## 2023-06-28 RX ADMIN — METOPROLOL SUCCINATE 100 MG: 100 TABLET, EXTENDED RELEASE ORAL at 05:13

## 2023-06-28 RX ADMIN — POTASSIUM CHLORIDE 20 MEQ: 1500 TABLET, EXTENDED RELEASE ORAL at 05:21

## 2023-06-28 RX ADMIN — DULOXETINE HYDROCHLORIDE 30 MG: 30 CAPSULE, DELAYED RELEASE ORAL at 05:14

## 2023-06-28 RX ADMIN — TRIAMCINOLONE ACETONIDE: 1 CREAM TOPICAL at 05:15

## 2023-06-28 RX ADMIN — ATORVASTATIN CALCIUM 40 MG: 40 TABLET, FILM COATED ORAL at 05:14

## 2023-06-28 RX ADMIN — APIXABAN 5 MG: 5 TABLET, FILM COATED ORAL at 05:14

## 2023-06-28 RX ADMIN — AMLODIPINE BESYLATE 5 MG: 10 TABLET ORAL at 05:15

## 2023-06-28 RX ADMIN — MORPHINE SULFATE 15 MG: 15 TABLET, FILM COATED, EXTENDED RELEASE ORAL at 05:13

## 2023-06-28 ASSESSMENT — PAIN DESCRIPTION - PAIN TYPE
TYPE: ACUTE PAIN

## 2023-06-28 NOTE — DISCHARGE INSTRUCTIONS
Discharge Instructions per Waqas Ybarra M.D.    Please follow up with Dr. Rosaura Aldana to your toes twice a day as discussed with Dr. Kaplan.    DIET: heart-healthy    ACTIVITY: as tolerated    DIAGNOSIS: peripheral arterial disease    Return to ER if symptoms worsen

## 2023-06-28 NOTE — DISCHARGE SUMMARY
Discharge Summary    CHIEF COMPLAINT ON ADMISSION  Chief Complaint   Patient presents with    Leg Pain     Pt biba by [employer], wed pt had numbness and tingling in BLE left leg pt unable to move, thurs pt became incontinent of bowel and bladder, today pt could no tolerate pain, pt was given rocephin and .75 gr of vanco, in route pt was given 200 mcg of fentanyl, 4mg of dilaudid, pt has bilateral iliac crest stents that are occluded, pt transported to rule out Cauda Equina       Reason for Admission  ems     Admission Date  6/23/2023    CODE STATUS  Full Code    HPI & HOSPITAL COURSE  This is a 52 y.o. female here with lower extremity pain and weakness.   Ms. Woodard is a 52 y.o. female past medical history of CAD, hypertension, hyperlipidemia, peripheral vascular disease status post placement of right common iliac artery stent on dual antiplatelet therapy aspirin/Plavix, cellulitis treated with antibiotics, psoriasis who presented 6/23/2023 with as a transfer from [facility] due to concerns of cauda equina with urinary incontinence.  Patient had CTA of the lower extremities with runoff which showed patent stents she was given vancomycin and Rocephin prior to arrival to cover for possible infection.  She denies any fevers, chills, chest pain or shortness of breath.  Denies any trauma to the area.  In the ER, she has subjective weakness to bilateral lower extremities with numbness.  On my exam power is 5 out of 5 bilaterally.  She does not have any saddle anesthesia.  She will be admitted to the neuro floor and an MRI of the lumbar spine with and without contrast has been ordered stat in addition to repeat labs CBC, metabolic panel, ESR and CRP.  MRI was reviewed and did not show evidence of acute changes to necessitate intervention.  A CTA of the aorta with runoff revealed abdominal aortic occlusion with bilateral iliac stent occlusion. On 6/24 she underwent catheter directed thrombolysis of bilateral common  "iliac stents by Dr. Figueroa with [facility] admission status post lytic therapy. On 6/26 she had bilateral common iliac stents placed.    Dr. Kaplan vascular surgery has recommended triple therapy with aspirin, plavix, and now the addition of Eliquis given that the stents had occluded. We discussed the risks of bleeding. She has osteomyelitis of the toe with dry gangrene. Dr. Kaplan's note reflects:  \"   I do not think that right toe amputation is required at this time.  I would recommend the patient paint the wounds with Betadine once or twice a day at home and we will monitor for potential healing.  I did explain to her and her daughter if the wounds get worse or if they get infected then she will definitely need toe amputation.\"      Therefore, she is discharged in good and stable condition to home with close outpatient follow-up.    The patient recovered much more quickly than anticipated on admission.    Discharge Date  6/28    FOLLOW UP ITEMS POST DISCHARGE  Either Dr. Figueroa or Dr. Kaplan    DISCHARGE DIAGNOSES  Principal Problem:    Thrombosed aortic conduit, initial encounter (HCC) (POA: Yes)  Active Problems:    Hypertension (POA: Yes)    Hyperlipidemia (POA: Yes)    Psoriasis (POA: Yes)    Dry gangrene (HCC) (POA: Yes)    Intractable back pain (POA: Yes)    Chronic narcotic dependence (HCC) (POA: Yes)    Subacute osteomyelitis of right foot (HCC) (POA: Yes)    Depression (POA: Yes)    Complaints of weakness of lower extremity (POA: Yes)  Resolved Problems:    History of procedure for peripheral vascular disease (POA: Yes)      FOLLOW UP  Future Appointments   Date Time Provider Department Center   8/8/2023  1:20 PM Sissy Castillo M.D. [facility]     Marlon Kaplan M.D.  [address] [facility] [address]  [place] [address]  [phone]    Schedule an appointment as soon as possible for a visit        MEDICATIONS ON DISCHARGE     Medication List        START taking these medications        Instructions   apixaban " 5mg Tabs  Commonly known as: ELIQUIS   Take 1 Tablet by mouth 2 times a day. Indications: Thromboembolism secondary to Atrial Fibrillation  Dose: 5 mg            CONTINUE taking these medications        Instructions   amLODIPine 2.5 MG Tabs  Commonly known as: NORVASC   Take 5 mg by mouth every day. 2 tablets = 5 mg.  Dose: 5 mg     aspirin 81 MG Chew chewable tablet  Commonly known as: ASA   Take 1 Tab by mouth every day.  Dose: 81 mg     atorvastatin 20 MG Tabs  Commonly known as: LIPITOR   Take 2 Tablets by mouth every day.  Dose: 40 mg     betamethasone dipropionate 0.05 % Oint   Doctor's comments: Apply to affected area on palms  Apply 1g to affected area on palms topically every day.  Dose: 1 g     celecoxib 200 MG Caps  Commonly known as: CELEBREX   Take 200 mg by mouth every day.  Dose: 200 mg     clopidogrel 75 MG Tabs  Commonly known as: PLAVIX   Take 1 Tablet by mouth every day.  Dose: 75 mg     furosemide 40 MG Tabs  Commonly known as: Lasix   Take 1 Tablet by mouth every day.  Dose: 40 mg     hydrOXYzine HCl 25 MG Tabs  Commonly known as: ATARAX   Take 25 mg by mouth every 6 hours as needed for Itching or Anxiety.  Dose: 25 mg     lidocaine-prilocaine 2.5-2.5 % Crea  Commonly known as: EMLA   Apply 1 g topically as needed (itching/irritation).  Dose: 1 g     metoprolol  MG Tb24  Commonly known as: TOPROL XL   Take 100 mg by mouth every day.  Dose: 100 mg     morphine ER 15 MG Tbcr tablet  Commonly known as: MS CONTIN   Take 15 mg by mouth every 12 hours.  Dose: 15 mg     oxyCODONE immediate release 10 MG immediate release tablet  Commonly known as: ROXICODONE   Take 10 mg by mouth every 6 hours as needed for Severe Pain.  Dose: 10 mg     potassium chloride SA 20 MEQ Tbcr  Commonly known as: Kdur   Take 1 Tablet by mouth every day.  Dose: 20 mEq     pregabalin 300 MG capsule  Commonly known as: LYRICA   Take 300 mg by mouth 2 times a day.  Dose: 300 mg     Secukinumab 150 MG/ML Soaj   Inject 300 mg  under the skin every 4 weeks.  Dose: 300 mg              Allergies  No Known Allergies    DIET  Orders Placed This Encounter   Procedures    Diet Order Diet: Consistent CHO (Diabetic)     Standing Status:   Standing     Number of Occurrences:   1     Order Specific Question:   Diet:     Answer:   Consistent CHO (Diabetic) [4]       ACTIVITY  As tolerated.      CONSULTATIONS  Vascular surgery    PROCEDURES  23        PRE-OPERATIVE DIAGNOSIS -      Bilateral lower extremity ischemia  Thrombosis bilateral iliac kissing stents     POST-OPERATIVE DIAGNOSIS -same     PROCEDURE PERFORMED -      Ultrasound-guided access bilateral common femoral arteries  Catheter placement aorta x2  Pelvic angiogram  Catheter directed thrombolysis bilateral common iliac arteries     SURGEON - Vivek Figueroa M.D.     Date:    2023     Patient: Lorene Woodard  :   1970  MRN:   [id number]  ___________________________________        Preoperative Diagnosis:  -Bilateral lower extremity ischemia  -Acute thrombosis of bilateral common iliac artery stents     Postoperative Diagnosis:  -Bilateral lower extremity ischemia  -Acute thrombosis of bilateral common iliac artery stents     Procedure:  -Aortoiliac angiogram through existing catheter for follow-up of thrombolytic infusion and completion of thrombolytic infusion therapy (68270)  -Placement of bilateral common iliac artery stents, 9 mm diameter by 10 cm long covered self-expanding Viabahn stents, in a kissing fashion, and then 8 mm post stent angioplasty (48084-37)  -Right lower extremity angiogram via existing sheath insertion in the right common femoral artery (99526)  -Closure of the bilateral common femoral artery access sites with 8 Somali Angio-Seal closure devices which deployed successfully ()       LABORATORY  Lab Results   Component Value Date    SODIUM 140 2023    POTASSIUM 3.8 2023    CHLORIDE 103 2023    CO2 26 2023    GLUCOSE 85  06/28/2023    BUN 16 06/28/2023    CREATININE 0.65 06/28/2023        Lab Results   Component Value Date    WBC 6.2 06/28/2023    HEMOGLOBIN 10.6 (L) 06/28/2023    HEMATOCRIT 31.7 (L) 06/28/2023    PLATELETCT 224 06/28/2023      US-EXTREMITY ARTERY LOWER BILAT         CT-CTA AORTA-RO WITH & W/O-POST PROCESS   Final Result      1.  Infrarenal abdominal aortic occlusion with bilateral iliac stent occlusion. There is considerable soft and calcified plaque but no aneurysm or dissection      2.  Bilateral reconstitution of external iliac arteries from internal iliac filling. Bilateral posterior tibialis contrast opacification at the level of the ankles. Dorsalis pedis lack of contrast which is more likely from the phase of contrast    enhancement than bilateral occlusion. No focal lower extremity stenosis is seen      3.  Nonspecific right lower lobe mildly enlarged peribronchial lymph node. Consider follow-up CT in 3-6 months to confirm resolution.      3D angiographic/MIP images of the vasculature confirm the vascular findings as described above.      MR-FOOT-WITH & W/O RIGHT   Final Result      1.  Second terminal tuft osteomyelitis without evidence of septic arthropathy or abscess      2.  Dysmorphic first distal phalanx terminal tuft contours with geographic margination suggesting chronic abnormality as can be seen with chronic/prior osteomyelitis. There is no adjacent edema to indicate acute osteomyelitis.      3.  Second metatarsal head Freiberg's infraction/avascular necrosis and there is no unstable fragment or evidence of osteoarthritis      US-BENI SINGLE LEVEL BILAT   Final Result      DX-FOOT-COMPLETE 3+ RIGHT   Final Result      First and second distal toe skin ulceration which could obscure underlying early osteomyelitis. If there is high clinical concern, MRI could be obtained to further evaluate      MR-LUMBAR SPINE-WITH & W/O   Final Result      1.  Multilevel multifactorial degenerative changes   2.  No  areas of abnormal enhancement   3.  No areas of high-grade central canal narrowing   4.  Areas of central canal and neural foraminal narrowing as described above      DX-FOOT-2- RIGHT   Final Result         1.  No acute traumatic bony injury. No destructive osseous process is identified, note however that plain film can be insensitive for evaluation of osteomyelitis and bone scan would offer improved diagnostic sensitivity as clinically appropriate.   2.  Plantar and Achilles calcaneal bone spurs.      IR-ABDOMINAL AORTA-WITH RUNOFF    (Results Pending)   IR-ABDOMINAL AORTA-WITH RUNOFF    (Results Pending)         Total time of the discharge process exceeds 34 minutes.

## 2023-06-28 NOTE — CARE PLAN
The patient is Stable - Low risk of patient condition declining or worsening    Shift Goals  Clinical Goals: Pt will remain free from new infection this shift, medications will be given per MAR  Patient Goals: comfort  Family Goals: WILDER    Progress made toward(s) clinical / shift goals:  Pt has remained free from new infection or worsening of existing infection this shift. Toes on R foot cleansed Q shift, open to air    Problem: Pain - Standard  Goal: Alleviation of pain or a reduction in pain to the patient’s comfort goal  Outcome: Progressing     Problem: Fall Risk  Goal: Patient will remain free from falls  Outcome: Progressing     Problem: Skin Integrity  Goal: Skin integrity is maintained or improved  Outcome: Progressing     Patient is not progressing towards the following goals:

## 2023-06-28 NOTE — CARE PLAN
The patient is Stable - Low risk of patient condition declining or worsening    Shift Goals  Clinical Goals: wound and skin care  Patient Goals: comfort  Family Goals: WILDER    Progress made toward(s) clinical / shift goals:  wound care done, mepilex applied to both heels as ordered    Problem: Skin Integrity  Goal: Skin integrity is maintained or improved  Outcome: Progressing       Patient is not progressing towards the following goals:

## 2023-06-28 NOTE — PROGRESS NOTES
Assumed care of patient at 1900 from Alexsandra PERRY. Patient is A&O x 4. Bed locked in the lowest position, 2 side rails up, call light is within reach, belongings at bedside. Hourly rounding is in place. Pt has no at this time.

## 2023-06-28 NOTE — PROGRESS NOTES
I went over all discharge instructions with pt and she voiced understanding. All personal belongings bagged up. IV removed and UC wheeled pt downstairs to meet her daughter.  To write work excuse and I will hang it at nurses desk for daughter to come  later.

## 2023-06-28 NOTE — PROGRESS NOTES
Assumed care of patient 0700. Received Report from [facility] nurse. Patient A&O 4, on RA, Reporting a pain level of  2. Call light within reach, belongings within reach, Fall precautions in place, and bed alarm is on and bed in lowest position. Patient does not have any other needs at this time.

## 2023-06-28 NOTE — PROGRESS NOTES
"  VASCULAR SURGERY SERVICE  Progress Note  ___________________________________    6/25/2023: Underwent initiation of thrombolytic therapy for thrombosed bilateral iliac stents    6/26/2023: Underwent placement of new covered iliac stents bilaterally    6/27/2023: No significant change, patient is feeling well.  She has a palpable right posterior tibial pulse and a palpable left dorsalis pedis pulse indicating excellent reperfusion of both lower extremities    Vitals  /61   Pulse 70   Temp 36.3 °C (97.3 °F) (Temporal)   Resp 16   Ht 1.702 m (5' 7\")   Wt 96.7 kg (213 lb 3 oz)   LMP 03/13/2017   SpO2 96%   BMI 33.39 kg/m²     Exam  General: alert, conversant, NAD  Extremities:    She has a palpable right posterior tibial pulse and a palpable left dorsalis pedis pulse indicating excellent reperfusion of both lower extremities  Bilateral femoral access sites are clean dry and intact with no hematoma    Labs  WBC normal  Hgb stable around 11  Creatinine normal    A/P)  New iliac stents are patent in both lower extremities are well-perfused.    No further intervention planned from vascular surgery standpoint.    Recommending aspirin 81 mg daily, Plavix 75 mg daily, and Eliquis 5 mg twice daily indefinitely to help maintain stent patency    I do not think that right toe amputation is required at this time.  I would recommend the patient paint the wounds with Betadine once or twice a day at home and we will monitor for potential healing.  I did explain to her and her daughter if the wounds get worse or if they get infected then she will definitely need toe amputation.    Appreciate Hospitalist services support  Following along  Possibly home tomorrow    Marlon Kaplan MD    [facility]  Voalte preferred or call my office [phone]  __________________________________________________________________  Patient:Lorene Woodard   MRN:[id number]   CSN:[id number]      "

## 2023-06-29 ENCOUNTER — TELEPHONE (OUTPATIENT)
Dept: HEALTH INFORMATION MANAGEMENT | Facility: OTHER | Age: 53
End: 2023-06-29

## 2023-07-11 ENCOUNTER — OFFICE VISIT (OUTPATIENT)
Dept: VASCULAR SURGERY | Facility: MEDICAL CENTER | Age: 53
End: 2023-07-11
Payer: COMMERCIAL

## 2023-07-11 VITALS
TEMPERATURE: 95.8 F | HEART RATE: 77 BPM | WEIGHT: 227 LBS | OXYGEN SATURATION: 97 % | BODY MASS INDEX: 35.63 KG/M2 | HEIGHT: 67 IN | SYSTOLIC BLOOD PRESSURE: 128 MMHG | DIASTOLIC BLOOD PRESSURE: 70 MMHG

## 2023-07-11 DIAGNOSIS — I77.9 PAOD (PERIPHERAL ARTERIAL OCCLUSIVE DISEASE) (HCC): ICD-10-CM

## 2023-07-11 PROCEDURE — 3074F SYST BP LT 130 MM HG: CPT | Performed by: SURGERY

## 2023-07-11 PROCEDURE — 3078F DIAST BP <80 MM HG: CPT | Performed by: SURGERY

## 2023-07-11 PROCEDURE — 99024 POSTOP FOLLOW-UP VISIT: CPT | Performed by: SURGERY

## 2023-07-11 ASSESSMENT — FIBROSIS 4 INDEX: FIB4 SCORE: 0.93

## 2023-07-11 NOTE — LETTER
July 11, 2023       Patient: Lorene Woodard   YOB: 1970   Date of Visit: 7/11/2023         To Whom It May Concern:    In my medical opinion, I recommend that Lorene Woodard return to full duty, no restrictions on 07/18/2023. If you have any questions or concerns, please don't hesitate to call [phone]          Sincerely,          Marlon Kaplan M.D.

## 2023-07-11 NOTE — PROGRESS NOTES
"                 VASCULAR SURGERY                 Clinic Progress Note  _________________________________    6/26/23 placement of kissing covered iliac stents    7/11/23 Presents for first follow-up visit. Doing well, some thigh soreness but BLE are well perfused so this is probably musculoskeletal in origin      Vitals  /70 (BP Location: Left arm, Patient Position: Sitting, BP Cuff Size: Large adult)   Pulse 77   Temp (!) 35.4 °C (95.8 °F) (Temporal)   Ht 1.702 m (5' 7\")   Wt 103 kg (227 lb) Comment: weighed with the boot on her foot  LMP 03/13/2017   SpO2 97%   BMI 35.55 kg/m²     BLE well perfused, palpable right PT and palpable left DP  Right 1st and 2nd toe are healing slowly, no gross evidence of infection      A/P)  Stents patent. Wounds healing. Follow-up 6 months with surveillance arterial testing or sooner if concerns arise.      Marlon Kaplan MD  [facility]  [phone]  [address], [place] [address]  "

## 2023-07-31 ENCOUNTER — TELEPHONE (OUTPATIENT)
Dept: SURGICAL ONCOLOGY | Facility: MEDICAL CENTER | Age: 53
End: 2023-07-31
Payer: COMMERCIAL

## 2023-07-31 ENCOUNTER — TELEPHONE (OUTPATIENT)
Dept: VASCULAR SURGERY | Facility: MEDICAL CENTER | Age: 53
End: 2023-07-31
Payer: COMMERCIAL

## 2023-07-31 NOTE — TELEPHONE ENCOUNTER
Patient Virginia Called today in regards to some concerns that she is currently experiencing. Patient states that she believes the recent surgery that she had with Dr. Kaplan failed. Virginia states that her legs are now back to feeling cold and she also states having major pain on the sides of her legs. Virginia states that as of right now she is still able to walk but she is having pain. Virginia's surgery was done 06/26/2023  the Procedure includes   -Aortoiliac angiogram through existing catheter for follow-up of thrombolytic infusion and completion of thrombolytic infusion therapy (72402)  -Placement of bilateral common iliac artery stents, 9 mm diameter by 10 cm long covered self-expanding Viabahn stents, in a kissing fashion, and then 8 mm post stent angioplasty (99770-81)  -Right lower extremity angiogram via existing sheath insertion in the right common femoral artery (84554)  -Closure of the bilateral common femoral artery access sites with 8 French Angio-Seal closure devices which deployed successfully ().  Virginia would like to know if Dr. Kaplan thinks that she needs to go to the ER to be evaluated. Did advise to Virginia that Dr. Kaplan is not in office until tomorrow so I will be send the message to both Dr. Kaplan and our on call provider.  Message sent via Voalte to on call provider Dr. Banks. Per Dr. Banks if patient is having problems then she should go ahead and be seen at the emergency room, if patient does not want to go be seen at the emergency room then she can schedule an appointment to be seen by Dr. Kaplan in office. Spoke with Virginia and and offered her both options and she states that she will be going to the [facility] emergency room tomorrow as she will be in town. Virginia did not have any further questions or concerns.

## 2023-07-31 NOTE — TELEPHONE ENCOUNTER
Delmy a work comp  called today asking to speak to doctor Kaplan about this patients care. She has number of questions as she has an open work comp for her right foot. Delmy is asking if she could get a call back from doctor to ask a few questions. She asked if I could fax her some records I stated she needs to get them through medical records to make sure she gets the correct things she needs. Delmy can be reached at [phone]    Thank you,  Pratima

## 2024-11-04 ENCOUNTER — HOSPITAL ENCOUNTER (OUTPATIENT)
Dept: LAB | Facility: MEDICAL CENTER | Age: 54
End: 2024-11-04
Attending: INTERNAL MEDICINE
Payer: COMMERCIAL

## 2024-11-04 DIAGNOSIS — Z79.899 HIGH RISK MEDICATION USE: ICD-10-CM

## 2024-11-04 DIAGNOSIS — L40.50 PSORIATIC ARTHRITIS (HCC): ICD-10-CM

## 2024-11-04 LAB
ALBUMIN SERPL BCP-MCNC: 4.1 G/DL (ref 3.2–4.9)
ALBUMIN/GLOB SERPL: 1.1 G/DL
ALP SERPL-CCNC: 146 U/L (ref 30–99)
ALT SERPL-CCNC: 18 U/L (ref 2–50)
ANION GAP SERPL CALC-SCNC: 8 MMOL/L (ref 7–16)
AST SERPL-CCNC: 27 U/L (ref 12–45)
BASOPHILS # BLD AUTO: 0.6 % (ref 0–1.8)
BASOPHILS # BLD: 0.03 K/UL (ref 0–0.12)
BILIRUB SERPL-MCNC: 0.4 MG/DL (ref 0.1–1.5)
BUN SERPL-MCNC: 6 MG/DL (ref 8–22)
CALCIUM ALBUM COR SERPL-MCNC: 9.2 MG/DL (ref 8.5–10.5)
CALCIUM SERPL-MCNC: 9.3 MG/DL (ref 8.5–10.5)
CHLORIDE SERPL-SCNC: 104 MMOL/L (ref 96–112)
CO2 SERPL-SCNC: 24 MMOL/L (ref 20–33)
CREAT SERPL-MCNC: 0.81 MG/DL (ref 0.5–1.4)
CRP SERPL HS-MCNC: <0.3 MG/DL (ref 0–0.75)
EOSINOPHIL # BLD AUTO: 0.11 K/UL (ref 0–0.51)
EOSINOPHIL NFR BLD: 2.1 % (ref 0–6.9)
ERYTHROCYTE [DISTWIDTH] IN BLOOD BY AUTOMATED COUNT: 41.7 FL (ref 35.9–50)
ERYTHROCYTE [SEDIMENTATION RATE] IN BLOOD BY WESTERGREN METHOD: 23 MM/HOUR (ref 0–25)
GFR SERPLBLD CREATININE-BSD FMLA CKD-EPI: 87 ML/MIN/1.73 M 2
GLOBULIN SER CALC-MCNC: 3.8 G/DL (ref 1.9–3.5)
GLUCOSE SERPL-MCNC: 96 MG/DL (ref 65–99)
HCT VFR BLD AUTO: 39.1 % (ref 37–47)
HGB BLD-MCNC: 13 G/DL (ref 12–16)
IMM GRANULOCYTES # BLD AUTO: 0.01 K/UL (ref 0–0.11)
IMM GRANULOCYTES NFR BLD AUTO: 0.2 % (ref 0–0.9)
LYMPHOCYTES # BLD AUTO: 1.86 K/UL (ref 1–4.8)
LYMPHOCYTES NFR BLD: 35.4 % (ref 22–41)
MCH RBC QN AUTO: 28.9 PG (ref 27–33)
MCHC RBC AUTO-ENTMCNC: 33.2 G/DL (ref 32.2–35.5)
MCV RBC AUTO: 86.9 FL (ref 81.4–97.8)
MONOCYTES # BLD AUTO: 0.43 K/UL (ref 0–0.85)
MONOCYTES NFR BLD AUTO: 8.2 % (ref 0–13.4)
NEUTROPHILS # BLD AUTO: 2.81 K/UL (ref 1.82–7.42)
NEUTROPHILS NFR BLD: 53.5 % (ref 44–72)
NRBC # BLD AUTO: 0 K/UL
NRBC BLD-RTO: 0 /100 WBC (ref 0–0.2)
PLATELET # BLD AUTO: 207 K/UL (ref 164–446)
PMV BLD AUTO: 9.6 FL (ref 9–12.9)
POTASSIUM SERPL-SCNC: 4.4 MMOL/L (ref 3.6–5.5)
PROT SERPL-MCNC: 7.9 G/DL (ref 6–8.2)
RBC # BLD AUTO: 4.5 M/UL (ref 4.2–5.4)
SODIUM SERPL-SCNC: 136 MMOL/L (ref 135–145)
WBC # BLD AUTO: 5.3 K/UL (ref 4.8–10.8)

## 2024-11-04 PROCEDURE — 86480 TB TEST CELL IMMUN MEASURE: CPT

## 2024-11-04 PROCEDURE — 85652 RBC SED RATE AUTOMATED: CPT

## 2024-11-04 PROCEDURE — 85025 COMPLETE CBC W/AUTO DIFF WBC: CPT

## 2024-11-04 PROCEDURE — 36415 COLL VENOUS BLD VENIPUNCTURE: CPT

## 2024-11-04 PROCEDURE — 86140 C-REACTIVE PROTEIN: CPT

## 2024-11-04 PROCEDURE — 80053 COMPREHEN METABOLIC PANEL: CPT

## 2024-11-05 LAB
GAMMA INTERFERON BACKGROUND BLD IA-ACNC: 0.02 IU/ML
M TB IFN-G BLD-IMP: NEGATIVE
M TB IFN-G CD4+ BCKGRND COR BLD-ACNC: 0 IU/ML
MITOGEN IGNF BCKGRD COR BLD-ACNC: >10 IU/ML
QFT TB2 - NIL TBQ2: 0 IU/ML

## (undated) DEVICE — CANNULA O2 COMFORT SOFT EAR ADULT 7 FT TUBING (50/CA)

## (undated) DEVICE — GLIDEWIRE ANGLED .035 X 180 (5EA/BX)

## (undated) DEVICE — SHEATH RO 6F 10CM (10EA/BX)

## (undated) DEVICE — DRAPE LARGE 3 QUARTER - (20/CA)

## (undated) DEVICE — SUCTION INSTRUMENT YANKAUER BULBOUS TIP W/O VENT (50EA/CA)

## (undated) DEVICE — SUTURE GENERAL

## (undated) DEVICE — SENSOR OXIMETER ADULT SPO2 RD SET (20EA/BX)

## (undated) DEVICE — ADHESIVE DERMABOND HVD MINI (12EA/BX)

## (undated) DEVICE — GOWN SURGEONS LARGE - (32/CA)

## (undated) DEVICE — BOVIE BLADE COATED - (50/PK)

## (undated) DEVICE — GOWN WARMING STANDARD FLEX - (30/CA)

## (undated) DEVICE — INTRODUCER SHEATH 6FR 2.5CM - DILATOR PROTRUDING (10/BX)

## (undated) DEVICE — BALLOON 8.0X80 75CM MUSTANG

## (undated) DEVICE — DRAPE SURGICAL U 77X120 - (10/CA)

## (undated) DEVICE — GLOVE BIOGEL PI INDICATOR SZ 6.5 SURGICAL PF LF - (50/BX 4BX/CA)

## (undated) DEVICE — SET LEADWIRE 5 LEAD BEDSIDE DISPOSABLE ECG (1SET OF 5/EA)

## (undated) DEVICE — STAPLER SKIN DISP - (6/BX 10BX/CA) VISISTAT

## (undated) DEVICE — NEEDLE THINWALL 19GA X 7CM

## (undated) DEVICE — TUBE E-T HI-LO CUFF 7.0MM (10EA/PK)

## (undated) DEVICE — DRAPE CLEAR W/ELASTIC BAND RAD CARM 40 X40" (20EA/CA)"

## (undated) DEVICE — CANISTER SUCTION 3000ML MECHANICAL FILTER AUTO SHUTOFF MEDI-VAC NONSTERILE LF DISP  (40EA/CA)

## (undated) DEVICE — SET INTRO MIRCROPUNCTURE - MPIS-501-SST

## (undated) DEVICE — KIT SYRINGE SINGLE FOR MEDRAD MARK V + PROVIS (50/CA)

## (undated) DEVICE — ELECTRODE DUAL RETURN W/ CORD - (50/PK)

## (undated) DEVICE — DRESSING TRANSPARENT FILM TEGADERM 4 X 4.75" (50EA/BX)"

## (undated) DEVICE — PACK AV FISTULA (4EA/CA)

## (undated) DEVICE — SHEATH RO 4F 10CM (10EA/BX)

## (undated) DEVICE — CONTAINER SPECIMEN BAG OR - STERILE 4 OZ W/LID (100EA/CA)

## (undated) DEVICE — GLIDECATH ANGLE 4F X 70CM (5EA/BX)

## (undated) DEVICE — CATHETER ANGIO OMNI FLUSH 5F 65CM (10EA/BX)

## (undated) DEVICE — CHLORAPREP 26 ML APPLICATOR - ORANGE TINT(25/CA)

## (undated) DEVICE — CLOSURE SKIN STRIP 1/2 X 4 IN - (STERI STRIP) (50/BX 4BX/CA)

## (undated) DEVICE — SET EXTENSION WITH 2 PORTS (48EA/CA) ***PART #2C8610 IS A SUBSTITUTE*****

## (undated) DEVICE — TOWELS CLOTH SURGICAL - (4/PK 20PK/CA)

## (undated) DEVICE — TUBING PRSS MNTR 48IN NAMIC - (25/CA)

## (undated) DEVICE — DRAPE IOBAN II INCISE 23X17 - (10EA/BX 4BX/CA)

## (undated) DEVICE — GUIDEWIRES STARTER (PTFE COATED) J CURVED FIXED CORE 0.035 180CM 10 3 MM J FS

## (undated) DEVICE — SUTURE 4-0 VICRYL PLUS FS-2 - 27 INCH (36/BX)

## (undated) DEVICE — GLOVE BIOGEL SZ 7 SURGICAL PF LTX - (50PR/BX 4BX/CA)

## (undated) DEVICE — GOWN SURGEONS X-LARGE - DISP. (30/CA)

## (undated) DEVICE — DECANTER FLD BLS - (50/CA)

## (undated) DEVICE — SODIUM CHL IRRIGATION 0.9% 1000ML (12EA/CA)

## (undated) DEVICE — TUBING CLEARLINK DUO-VENT - C-FLO (48EA/CA)

## (undated) DEVICE — SUTURE 3-0 ETHILON FS-1 - (36/BX) 30 INCH

## (undated) DEVICE — BALLOON 8.0X60 75 CM MUSTANG

## (undated) DEVICE — LACTATED RINGERS INJ 1000 ML - (14EA/CA 60CA/PF)

## (undated) DEVICE — TOWEL STOP TIMEOUT SAFETY FLAG (40EA/CA)

## (undated) DEVICE — DRAPE 36X28IN RAD CARM BND BG - (25/CA) O

## (undated) DEVICE — SODIUM CHL. INJ. 0.9% 500ML (24EA/CA 50CA/PF)